# Patient Record
Sex: MALE | Race: WHITE | NOT HISPANIC OR LATINO | Employment: OTHER | URBAN - METROPOLITAN AREA
[De-identification: names, ages, dates, MRNs, and addresses within clinical notes are randomized per-mention and may not be internally consistent; named-entity substitution may affect disease eponyms.]

---

## 2017-01-25 DIAGNOSIS — Z86.73 H/O: CVA (CEREBROVASCULAR ACCIDENT): ICD-10-CM

## 2017-01-25 DIAGNOSIS — Z86.718 PERSONAL HISTORY OF DVT (DEEP VEIN THROMBOSIS): ICD-10-CM

## 2017-02-14 ENCOUNTER — OFFICE VISIT (OUTPATIENT)
Dept: MEDICAL GROUP | Facility: MEDICAL CENTER | Age: 62
End: 2017-02-14
Payer: MEDICARE

## 2017-02-14 VITALS
WEIGHT: 186 LBS | TEMPERATURE: 98.2 F | DIASTOLIC BLOOD PRESSURE: 84 MMHG | HEART RATE: 74 BPM | OXYGEN SATURATION: 96 % | SYSTOLIC BLOOD PRESSURE: 144 MMHG | HEIGHT: 72 IN | RESPIRATION RATE: 14 BRPM | BODY MASS INDEX: 25.19 KG/M2

## 2017-02-14 DIAGNOSIS — Z86.73 H/O: CVA (CEREBROVASCULAR ACCIDENT): ICD-10-CM

## 2017-02-14 DIAGNOSIS — J40 BRONCHITIS: ICD-10-CM

## 2017-02-14 DIAGNOSIS — Z79.01 CHRONIC ANTICOAGULATION: ICD-10-CM

## 2017-02-14 DIAGNOSIS — J02.9 SORE THROAT: ICD-10-CM

## 2017-02-14 DIAGNOSIS — Z99.3 WHEELCHAIR BOUND: ICD-10-CM

## 2017-02-14 DIAGNOSIS — I69.919 COGNITIVE DEFICITS AS LATE EFFECT OF CEREBROVASCULAR DISEASE: ICD-10-CM

## 2017-02-14 DIAGNOSIS — Z00.00 HEALTH CARE MAINTENANCE: ICD-10-CM

## 2017-02-14 DIAGNOSIS — I69.959 HEMIPLEGIA OF DOMINANT SIDE AS LATE EFFECT FOLLOWING CEREBROVASCULAR DISEASE (HCC): ICD-10-CM

## 2017-02-14 PROCEDURE — 99214 OFFICE O/P EST MOD 30 MIN: CPT | Performed by: INTERNAL MEDICINE

## 2017-02-14 RX ORDER — AZITHROMYCIN 250 MG/1
TABLET, FILM COATED ORAL
Qty: 6 TAB | Refills: 0 | Status: SHIPPED | OUTPATIENT
Start: 2017-02-14 | End: 2017-03-15

## 2017-02-14 RX ORDER — BENZONATATE 100 MG/1
100 CAPSULE ORAL 3 TIMES DAILY PRN
Qty: 60 CAP | Refills: 0 | Status: SHIPPED | OUTPATIENT
Start: 2017-02-14 | End: 2017-03-15

## 2017-02-14 NOTE — MR AVS SNAPSHOT
"        Drew Melton   2017 11:20 AM   Office Visit   MRN: [id number]    Department:  [place] [address]   Dept Phone:  [phone]    Description:  Male : 1955   Provider:  Jamari Platt M.D.           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   [facility] ([place])    [address]  [address]  [facility] [id number] [phone]           You will be receiving a confirmation call a few days before your appointment from our automated call confirmation system.            2017 10:00 AM   Established Patient with [facility] EXAM 5   [facility]  (--)    [address] [facility] [address]   [phone]            Mar 15, 2017 10:00 AM   Established Patient with Jamari Platt M.D.   [facility] ([place])    [address]  [address]  [facility] [id number] [phone]           You will be receiving a confirmation call a few days before your appointment from our automated call confirmation system.              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 (CMS-HCC) I69.959   Unknown - Present    Dyslipidemia (high LDL; low HDL) (Chronic) E78.4   10/8/2012 - Present    Chronic anticoagulation Z79.01   10/8/2013 - Present    Wheelchair bound Z99.3   3/18/2016 - Present    Health care maintenance Z00.00   9/15/2016 - Present    H/O: CVA (cerebrovascular accident) Z86.73   9/15/2016 - Present    Screening for malignant neoplasm of colon Z12.11   9/15/2016 - Present      Health Maintenance        Date Due Completion Dates    IMM INFLUENZA (1) 9/1/2016 10/21/2014, 10/8/2013, 10/10/2012    IMM ZOSTER VACCINE 12/20/2016 ---    COLONOSCOPY 4/8/2023 4/8/2013 (Declined)    Override on 4/8/2013: Patient Declined    IMM DTaP/Tdap/Td Vaccine (2 - Td) 4/8/2023 4/8/2013            Current Immunizations     Influenza TIV (IM) 10/8/2013, 10/10/2012    Influenza Vaccine Quad Inj (Preserved) 10/21/2014    Pneumococcal polysaccharide vaccine (PPSV-23) 10/21/2014, 10/8/2012 11:29 AM    Tdap Vaccine 4/8/2013      Below and/or attached are the medications your provider expects you to take. Review all of your home medications and newly ordered medications with your provider and/or pharmacist. Follow medication instructions as directed by your provider and/or pharmacist. Please keep your medication list with you and share with your provider. Update the information when medications are discontinued, doses are changed, or new medications (including over-the-counter products) are added; and carry medication information at all times in the event of emergency situations     Allergies:  No Known Allergies          Medications  Valid as of: February 14, 2017 - 11:12 AM    Generic Name Brand Name Tablet Size Instructions for use    Azithromycin (Tab) ZITHROMAX 250 MG Take 1 tabl twice daily the first  day, then 1 tabl daily, PO.        Benzonatate (Cap) TESSALON 100 MG Take 1 Cap by mouth 3 times a day as needed for Cough.        Cholecalciferol (Cap) Vitamin D3 2000 UNIT Take 1 Cap by mouth every day.        Lovastatin (Tab) MEVACOR 10 MG TAKE ONE TABLET BY MOUTH ONCE DAILY        Potassium Chloride Brigitte CR (Tab CR) Kdur 20 MEQ Take 1 Tab by mouth every day.        Warfarin Sodium (Tab) COUMADIN 2.5 MG TAKE ONE-HALF TO ONE TABLET BY MOUTH ONCE DAILY AS DIRECTED BY THE COUMADIN CLINIC        .                 Medicines prescribed today were sent to:     [facility] [address] [place] [address], [place] - [address] [place] [address]    Phone: [phone] Fax: [phone]    Open 24 Hours?: No      Medication refill instructions:       If your prescription bottle indicates you have medication refills left, it is not necessary to call your provider’s office. Please contact your pharmacy and they will refill your medication.    If your prescription bottle indicates you do not have any refills left, you may request refills at any time through one of the following ways: The online PassKit system (except Urgent Care), by calling your provider’s office, or by asking your pharmacy to contact your provider’s office with a refill request. Medication refills are processed only during regular business hours and may not be available until the next business day. Your provider may request additional information or to have a follow-up visit with you prior to refilling your medication.   *Please Note: Medication refills are assigned a new Rx number when refilled electronically. Your pharmacy may indicate that no refills were authorized even though a new prescription for the same medication is available at the pharmacy. Please request the medicine by name with the pharmacy before contacting your provider for a refill.           PassKit Access Code: [email or web address]  Expires: 3/16/2017 11:12 AM    Trevor HILLIARD  secure, online tool to manage your health information     [employer]’s [employer]® is a secure, online tool that connects you to your personalized health information from the privacy of your home -- day or night - making it very easy for you to manage your healthcare. Once the activation process is completed, you can even access your medical information using the Everyware Global hal, which is available for free in the Apple Hal store or Google Play store.     Every[employer] Global provides the following levels of access (as shown below):   My Chart Features   [facility] Doctor [facility]  Specialists [facility]  Urgent  Care [facility]  Primary Care  Doctor   Email your healthcare team securely and privately 24/7 X X X    Manage appointments: schedule your next appointment; view details of past/upcoming appointments X      Request prescription refills. X      View recent personal medical records, including lab and immunizations X X X X   View health record, including health history, allergies, medications X X X X   Read reports about your outpatient visits, procedures, consult and ER notes X X X X   See your discharge summary, which is a recap of your hospital and/or ER visit that includes your diagnosis, lab results, and care plan. X X       How to register for Everyware Global:  1. Go to  [email or web address].  2. Click on the Sign Up Now box, which takes you to the New Member Sign Up page. You will need to provide the following information:  a. Enter your Every[employer] Global Access Code exactly as it appears at the top of this page. (You will not need to use this code after you’ve completed the sign-up process. If you do not sign up before the expiration date, you must request a new code.)   b. Enter your date of birth.   c. Enter your home email address.   d. Click Submit, and follow the next screen’s instructions.  3. Create a Everyware Global ID. This will be your Everyware Global login ID and cannot be changed, so think of one that is secure and easy to  remember.  4. Create a [employer] password. You can change your password at any time.  5. Enter your Password Reset Question and Answer. This can be used at a later time if you forget your password.   6. Enter your e-mail address. This allows you to receive e-mail notifications when new information is available in [employer].  7. Click Sign Up. You can now view your health information.    For assistance activating your [employer] account, call [phone]
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
## 2017-02-22 ENCOUNTER — APPOINTMENT (OUTPATIENT)
Dept: VASCULAR LAB | Facility: MEDICAL CENTER | Age: 62
End: 2017-02-22
Attending: INTERNAL MEDICINE
Payer: MEDICARE

## 2017-03-01 ENCOUNTER — ANTICOAGULATION VISIT (OUTPATIENT)
Dept: VASCULAR LAB | Facility: MEDICAL CENTER | Age: 62
End: 2017-03-01
Attending: INTERNAL MEDICINE
Payer: MEDICARE

## 2017-03-01 DIAGNOSIS — Z79.01 CHRONIC ANTICOAGULATION: ICD-10-CM

## 2017-03-01 LAB
INR BLD: 2.6 (ref 0.9–1.2)
INR PPP: 2.6 (ref 2–3.5)

## 2017-03-01 PROCEDURE — 85610 PROTHROMBIN TIME: CPT

## 2017-03-01 PROCEDURE — 99212 OFFICE O/P EST SF 10 MIN: CPT | Performed by: NURSE PRACTITIONER

## 2017-03-01 NOTE — MR AVS SNAPSHOT
Drew Melton   3/1/2017 9:00 AM   Anticoagulation Visit   MRN: [id number]    Department:  Vascular Medicine   Dept Phone:  [phone]    Description:  Male : 1955   Provider:  [facility] EXAM 5           Allergies as of 3/1/2017     No Known Allergies      You were diagnosed with     Chronic anticoagulation   [256206]         Vital Signs     Smoking Status                   Former Smoker           Basic Information     Date Of Birth Sex Race Ethnicity Preferred Language    1955 Male White Non- English      Your appointments     Mar 01, 2017  9:00 AM   Established Patient with IHV EXAM 5   [facility]  (--)    [address]  [place] [address]   [phone]            Mar 15, 2017 10:00 AM   Established Patient with Jamari Platt M.D.   [facility]    [address]  [address]  [place] [id number] [phone]           You will be receiving a confirmation call a few days before your appointment from our automated call confirmation system.            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 (CMS-HCC) I69.959   Unknown - Present    Dyslipidemia (high LDL; low HDL) (Chronic) E78.4   10/8/2012 - Present    Chronic anticoagulation Z79.01   10/8/2013 - Present    Wheelchair bound Z99.3   3/18/2016 - Present    Health care maintenance Z00.00   9/15/2016 - Present    H/O: CVA (cerebrovascular accident) Z86.73   9/15/2016 - Present    Screening for malignant neoplasm of colon Z12.11    9/15/2016 - Present      Health Maintenance        Date Due Completion Dates    IMM INFLUENZA (1) 9/1/2016 10/21/2014, 10/8/2013, 10/10/2012    IMM ZOSTER VACCINE 12/20/2016 ---    COLONOSCOPY 4/8/2023 4/8/2013 (Declined)    Override on 4/8/2013: Patient Declined    IMM DTaP/Tdap/Td Vaccine (2 - Td) 4/8/2023 4/8/2013            Results     POCT Protime      Component    INR    2.6                        Current Immunizations     Influenza TIV (IM) 10/8/2013, 10/10/2012    Influenza Vaccine Quad Inj (Preserved) 10/21/2014    Pneumococcal polysaccharide vaccine (PPSV-23) 10/21/2014, 10/8/2012 11:29 AM    Tdap Vaccine 4/8/2013      Below and/or attached are the medications your provider expects you to take. Review all of your home medications and newly ordered medications with your provider and/or pharmacist. Follow medication instructions as directed by your provider and/or pharmacist. Please keep your medication list with you and share with your provider. Update the information when medications are discontinued, doses are changed, or new medications (including over-the-counter products) are added; and carry medication information at all times in the event of emergency situations     Allergies:  No Known Allergies          Medications  Valid as of: March 01, 2017 -  8:34 AM    Generic Name Brand Name Tablet Size Instructions for use    Azithromycin (Tab) ZITHROMAX 250 MG Take 1 tabl twice daily the first day, then 1 tabl daily, PO.        Benzonatate (Cap) TESSALON 100 MG Take 1 Cap by mouth 3 times a day as needed for Cough.        Cholecalciferol (Cap) Vitamin D3 2000 UNIT Take 1 Cap by mouth every day.        Lovastatin (Tab) MEVACOR 10 MG TAKE ONE TABLET BY MOUTH ONCE DAILY        Potassium Chloride Brigitte CR (Tab CR) Kdur 20 MEQ Take 1 Tab by mouth every day.        Warfarin Sodium (Tab) COUMADIN 2.5 MG TAKE ONE-HALF TO ONE TABLET BY MOUTH ONCE DAILY AS DIRECTED BY THE COUMADIN CLINIC        .                    Medicines prescribed today were sent to:     [facility] - [place], [place] - [address] [place] [address]    Phone: [phone] Fax: [phone]    Open 24 Hours?: No      Medication refill instructions:       If your prescription bottle indicates you have medication refills left, it is not necessary to call your provider’s office. Please contact your pharmacy and they will refill your medication.    If your prescription bottle indicates you do not have any refills left, you may request refills at any time through one of the following ways: The online Axial Exchange system (except Urgent Care), by calling your provider’s office, or by asking your pharmacy to contact your provider’s office with a refill request. Medication refills are processed only during regular business hours and may not be available until the next business day. Your provider may request additional information or to have a follow-up visit with you prior to refilling your medication.   *Please Note: Medication refills are assigned a new Rx number when refilled electronically. Your pharmacy may indicate that no refills were authorized even though a new prescription for the same medication is available at the pharmacy. Please request the medicine by name with the pharmacy before contacting your provider for a refill.        Warfarin Dosing Calendar   March 2017 Details    Sun Mon Tue Wed Thu Fri Sat        1   2.6   1.25 mg   See details      2      2.5 mg         3      2.5 mg         4      2.5 mg           5      2.5 mg         6      2.5 mg         7      2.5 mg         8      1.25 mg         9      2.5 mg         10      2.5 mg         11      2.5 mg           12      2.5 mg         13      2.5 mg         14      2.5 mg         15      1.25 mg         16      2.5 mg         17      2.5 mg         18      2.5 mg           19      2.5 mg         20      2.5 mg         21      2.5 mg         22      1.25 mg          23      2.5 mg         24      2.5 mg         25      2.5 mg           26      2.5 mg         27      2.5 mg         28      2.5 mg         29      1.25 mg         30      2.5 mg         31      2.5 mg           Date Details   03/01 This INR check   INR: 2.6               How to take your warfarin dose     To take:  1.25 mg Take 0.5 of a 2.5 mg tablet.    To take:  2.5 mg Take 1 of the 2.5 mg tablets.           Warfarin Dosing Calendar   April 2017 Details    Sun Mon Tue Wed Thu Fri Sat           1      2.5 mg           2      2.5 mg         3      2.5 mg         4      2.5 mg         5      1.25 mg         6      2.5 mg         7      2.5 mg         8      2.5 mg           9      2.5 mg         10      2.5 mg         11      2.5 mg         12      1.25 mg         13      2.5 mg         14      2.5 mg         15      2.5 mg           16      2.5 mg         17      2.5 mg         18      2.5 mg         19      1.25 mg         20      2.5 mg         21      2.5 mg         22      2.5 mg           23      2.5 mg         24      2.5 mg         25      2.5 mg         26      1.25 mg         27      2.5 mg         28      2.5 mg         29      2.5 mg           30      2.5 mg                Date Details   No additional details            How to take your warfarin dose     To take:  1.25 mg Take 0.5 of a 2.5 mg tablet.    To take:  2.5 mg Take 1 of the 2.5 mg tablets.           Warfarin Dosing Calendar   May 2017 Details    Sun Mon Tue Wed Thu Fri Sat      1      2.5 mg         2      2.5 mg         3      1.25 mg         4               5               6                 7               8               9               10               11               12               13                 14               15               16               17               18               19               20                 21               22               23               24               25               26               27                  28               29               30               31                   Date Details   No additional details    Date of next INR:  5/3/2017         How to take your warfarin dose     To take:  1.25 mg Take 0.5 of a 2.5 mg tablet.    To take:  2.5 mg Take 1 of the 2.5 mg tablets.              Nutraspace Access Code: [email or web address]  Expires: 3/16/2017 11:12 AM    Nutraspace  A secure, online tool to manage your health information     [employer]’s Nutraspace® is a secure, online tool that connects you to your personalized health information from the privacy of your home -- day or night - making it very easy for you to manage your healthcare. Once the activation process is completed, you can even access your medical information using the Nutraspace hal, which is available for free in the Apple Hal store or Google Play store.     Nutraspace provides the following levels of access (as shown below):   My Chart Features   [facility] Primary Care Doctor [facility]  Specialists [facility]  Urgent  Care Non-[facility]  Primary Care  Doctor   Email your healthcare team securely and privately 24/7 X X X    Manage appointments: schedule your next appointment; view details of past/upcoming appointments X      Request prescription refills. X      View recent personal medical records, including lab and immunizations X X X X   View health record, including health history, allergies, medications X X X X   Read reports about your outpatient visits, procedures, consult and ER notes X X X X   See your discharge summary, which is a recap of your hospital and/or ER visit that includes your diagnosis, lab results, and care plan. X X       How to register for Nutraspace:  1. Go to  [email or web address].  2. Click on the Sign Up Now box, which takes you to the New Member Sign Up page. You will need to provide the following information:  a. Enter your Nutraspace Access Code exactly as it appears at the top of this page. (You will not need to use this code after  you’ve completed the sign-up process. If you do not sign up before the expiration date, you must request a new code.)   b. Enter your date of birth.   c. Enter your home email address.   d. Click Submit, and follow the next screen’s instructions.  3. Create a ADVANCED MEDICAL ISOTOPEt ID. This will be your ADVANCED MEDICAL ISOTOPEt login ID and cannot be changed, so think of one that is secure and easy to remember.  4. Create a ADVANCED MEDICAL ISOTOPEt password. You can change your password at any time.  5. Enter your Password Reset Question and Answer. This can be used at a later time if you forget your password.   6. Enter your e-mail address. This allows you to receive e-mail notifications when new information is available in ScaleIO.  7. Click Sign Up. You can now view your health information.    For assistance activating your ScaleIO account, call [phone]

## 2017-03-01 NOTE — PROGRESS NOTES
Anticoagulation Summary as of 3/1/2017     INR goal 2.0-3.0   Selected INR 2.6 (3/1/2017)   Maintenance plan 1.25 mg (2.5 mg x 0.5) on Wed; 2.5 mg (2.5 mg x 1) all other days   Weekly total 16.25 mg   No change documented Khushboo Wiseman, A.P.N.   Plan last modified Gary Wall, PHARMD (9/30/2015)   Next INR check 5/3/2017   Target end date Indefinite    Indications   Chronic anticoagulation [Z79.01]  Deep vein thrombosis [453.40] (Resolved) [I82.409]  Stroke [434.91] (Resolved) [I63.9]  Deep vein thrombosis (DVT) (CMS-HCC) (Resolved) [I82.409]         Anticoagulation Episode Summary     [facility]    Preferred lab     Send INR reminders to     Comments       Anticoagulation Care Providers     Provider Role Specialty Phone number    Jamari Platt M.D. Referring Family Medicine [phone]    [facility]   [phone]        Patient is therapeutic today. On azithromycin & Tessalon last month. Encouraged to call for future medication changes. Denies any diet changes. No current symptoms of bleeding or thrombosis reported. Continue current regimen. Follow up in 9 weeks.    Next Appointment: Wednesday, May 3, 2017 at 9:45 am.     Khushboo FREED

## 2017-03-02 ENCOUNTER — HOSPITAL ENCOUNTER (OUTPATIENT)
Dept: LAB | Facility: MEDICAL CENTER | Age: 62
End: 2017-03-02
Attending: INTERNAL MEDICINE
Payer: MEDICARE

## 2017-03-02 DIAGNOSIS — Z00.00 HEALTH CARE MAINTENANCE: ICD-10-CM

## 2017-03-02 DIAGNOSIS — E78.5 DYSLIPIDEMIA (HIGH LDL; LOW HDL): Chronic | ICD-10-CM

## 2017-03-02 DIAGNOSIS — Z12.5 ENCOUNTER FOR SCREENING FOR MALIGNANT NEOPLASM OF PROSTATE: ICD-10-CM

## 2017-03-02 LAB
ANION GAP SERPL CALC-SCNC: 9 MMOL/L (ref 0–11.9)
BASOPHILS # BLD AUTO: 0.03 K/UL (ref 0–0.12)
BASOPHILS NFR BLD AUTO: 0.7 % (ref 0–1.8)
BUN SERPL-MCNC: 30 MG/DL (ref 8–22)
CALCIUM SERPL-MCNC: 9.7 MG/DL (ref 8.5–10.5)
CHLORIDE SERPL-SCNC: 109 MMOL/L (ref 96–112)
CHOLEST SERPL-MCNC: 136 MG/DL (ref 100–199)
CO2 SERPL-SCNC: 28 MMOL/L (ref 20–33)
CREAT SERPL-MCNC: 0.85 MG/DL (ref 0.5–1.4)
EOSINOPHIL # BLD: 0.09 K/UL (ref 0–0.51)
EOSINOPHIL NFR BLD AUTO: 2.1 % (ref 0–6.9)
ERYTHROCYTE [DISTWIDTH] IN BLOOD BY AUTOMATED COUNT: 51.5 FL (ref 35.9–50)
GLUCOSE SERPL-MCNC: 118 MG/DL (ref 65–99)
HCT VFR BLD AUTO: 46.5 % (ref 42–52)
HDLC SERPL-MCNC: 31 MG/DL
HGB BLD-MCNC: 15.4 G/DL (ref 14–18)
IMM GRANULOCYTES # BLD AUTO: 0.01 K/UL (ref 0–0.11)
IMM GRANULOCYTES NFR BLD AUTO: 0.2 % (ref 0–0.9)
LDLC SERPL CALC-MCNC: 64 MG/DL
LYMPHOCYTES # BLD: 1.56 K/UL (ref 1–4.8)
LYMPHOCYTES NFR BLD AUTO: 36.3 % (ref 22–41)
MCH RBC QN AUTO: 32 PG (ref 27–33)
MCHC RBC AUTO-ENTMCNC: 33.1 G/DL (ref 33.7–35.3)
MCV RBC AUTO: 96.5 FL (ref 81.4–97.8)
MONOCYTES # BLD: 0.26 K/UL (ref 0–0.85)
MONOCYTES NFR BLD AUTO: 6 % (ref 0–13.4)
NEUTROPHILS # BLD: 2.35 K/UL (ref 1.82–7.42)
NEUTROPHILS NFR BLD AUTO: 54.7 % (ref 44–72)
NRBC # BLD AUTO: 0 K/UL
NRBC BLD-RTO: 0 /100 WBC
PLATELET # BLD AUTO: 177 K/UL (ref 164–446)
PMV BLD AUTO: 10 FL (ref 9–12.9)
POTASSIUM SERPL-SCNC: 4.2 MMOL/L (ref 3.6–5.5)
PSA SERPL DL<=0.01 NG/ML-MCNC: 0.56 NG/ML (ref 0–4)
RBC # BLD AUTO: 4.82 M/UL (ref 4.7–6.1)
SODIUM SERPL-SCNC: 146 MMOL/L (ref 135–145)
TRIGL SERPL-MCNC: 203 MG/DL (ref 0–149)
WBC # BLD AUTO: 4.3 K/UL (ref 4.8–10.8)

## 2017-03-02 PROCEDURE — 36415 COLL VENOUS BLD VENIPUNCTURE: CPT

## 2017-03-02 PROCEDURE — 85025 COMPLETE CBC W/AUTO DIFF WBC: CPT

## 2017-03-02 PROCEDURE — 80061 LIPID PANEL: CPT

## 2017-03-02 PROCEDURE — 84153 ASSAY OF PSA TOTAL: CPT | Mod: GA

## 2017-03-02 PROCEDURE — 80048 BASIC METABOLIC PNL TOTAL CA: CPT

## 2017-03-03 ENCOUNTER — TELEPHONE (OUTPATIENT)
Dept: MEDICAL GROUP | Facility: MEDICAL CENTER | Age: 62
End: 2017-03-03

## 2017-03-03 NOTE — TELEPHONE ENCOUNTER
----- Message from Michael Whittaker M.D. sent at 3/2/2017  5:24 PM PST -----  MA to call patient to relate the following:     Recent lab results were mostly within normal limits, apart from moderate dehydration (high sodium, high BUN) and high cholesterol.  If patient would like to discuss them in further detail, you can discuss this with Dr. Lindo at clinic visit on 03/15/17 @10am

## 2017-03-14 RX ORDER — LOVASTATIN 10 MG/1
TABLET ORAL
Qty: 90 TAB | Refills: 1 | Status: CANCELLED | OUTPATIENT
Start: 2017-03-14

## 2017-03-14 RX ORDER — POTASSIUM CHLORIDE 20 MEQ/1
20 TABLET, EXTENDED RELEASE ORAL DAILY
Qty: 90 TAB | Refills: 0 | Status: CANCELLED | OUTPATIENT
Start: 2017-03-14

## 2017-03-15 ENCOUNTER — OFFICE VISIT (OUTPATIENT)
Dept: MEDICAL GROUP | Facility: MEDICAL CENTER | Age: 62
End: 2017-03-15
Payer: MEDICARE

## 2017-03-15 VITALS
HEIGHT: 72 IN | WEIGHT: 186 LBS | OXYGEN SATURATION: 97 % | TEMPERATURE: 97.2 F | BODY MASS INDEX: 25.19 KG/M2 | HEART RATE: 73 BPM | DIASTOLIC BLOOD PRESSURE: 68 MMHG | SYSTOLIC BLOOD PRESSURE: 122 MMHG

## 2017-03-15 DIAGNOSIS — Z00.00 HEALTH CARE MAINTENANCE: ICD-10-CM

## 2017-03-15 DIAGNOSIS — E78.5 DYSLIPIDEMIA (HIGH LDL; LOW HDL): Chronic | ICD-10-CM

## 2017-03-15 DIAGNOSIS — E87.6 HYPOKALEMIA: ICD-10-CM

## 2017-03-15 DIAGNOSIS — R73.01 IFG (IMPAIRED FASTING GLUCOSE): ICD-10-CM

## 2017-03-15 PROBLEM — R53.81 PHYSICAL DECONDITIONING: Status: ACTIVE | Noted: 2017-03-15

## 2017-03-15 PROCEDURE — 99214 OFFICE O/P EST MOD 30 MIN: CPT | Performed by: INTERNAL MEDICINE

## 2017-03-15 RX ORDER — FENOFIBRATE 48 MG/1
48 TABLET, COATED ORAL DAILY
Qty: 90 TAB | Refills: 1 | Status: SHIPPED | OUTPATIENT
Start: 2017-03-15 | End: 2017-09-10 | Stop reason: SDUPTHER

## 2017-03-15 RX ORDER — POTASSIUM CHLORIDE 20 MEQ/1
20 TABLET, EXTENDED RELEASE ORAL DAILY
Qty: 90 TAB | Refills: 1 | Status: SHIPPED | OUTPATIENT
Start: 2017-03-15 | End: 2017-09-10 | Stop reason: SDUPTHER

## 2017-03-15 RX ORDER — LOVASTATIN 10 MG/1
10 TABLET ORAL DAILY
Qty: 90 TAB | Refills: 1 | Status: SHIPPED | OUTPATIENT
Start: 2017-03-15 | End: 2017-09-10 | Stop reason: SDUPTHER

## 2017-03-15 ASSESSMENT — PATIENT HEALTH QUESTIONNAIRE - PHQ9: CLINICAL INTERPRETATION OF PHQ2 SCORE: 0

## 2017-03-15 NOTE — MR AVS SNAPSHOT
Drew Melton   3/15/2017 10:00 AM   Office Visit   MRN: [id number]    Department:  [place] [address]   Dept Phone:  [phone]    Description:  Male : 1955   Provider:  Jamari Platt M.D.           Reason for Visit     Follow-Up           Allergies as of 3/15/2017     No Known Allergies      You were diagnosed with     IFG (impaired fasting glucose)   [477616]       Dyslipidemia (high LDL; low HDL)   [543448]       Hypokalemia   [004407]       Health care maintenance   [434354]         Vital Signs     Blood Pressure Pulse Temperature Height Weight Body Mass Index    122/68 mmHg 73 36.2 °C (97.2 °F) 1.829 m (6') 84.369 kg (186 lb) 25.22 kg/m2    Oxygen Saturation Smoking Status                97% Former Smoker          Basic Information     Date Of Birth Sex Race Ethnicity Preferred Language    1955 Male White Non- English      Your appointments     May 03, 2017  9:45 AM   Established Patient with [facility] EXAM 5   [facility]  (--)    [address] [address]   [phone]            2017 10:40 AM   ANNUAL EXAM PREVENTATIVE with Jamari Platt M.D.   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 (CMS-HCC) I69.959   Unknown - Present    Dyslipidemia (high LDL; low HDL) (Chronic) E78.4   10/8/2012 - Present    Chronic anticoagulation Z79.01   10/8/2013 - Present    Wheelchair bound Z99.3   3/18/2016 - Present    Health care maintenance Z00.00   9/15/2016 - Present    H/O: CVA (cerebrovascular accident) Z86.73   9/15/2016 - Present    Screening for malignant neoplasm of  colon Z12.11   9/15/2016 - Present      Health Maintenance        Date Due Completion Dates    IMM INFLUENZA (1) 9/1/2016 10/21/2014, 10/8/2013, 10/10/2012    IMM ZOSTER VACCINE 12/20/2016 ---    COLONOSCOPY 4/8/2023 4/8/2013 (Declined)    Override on 4/8/2013: Patient Declined    IMM DTaP/Tdap/Td Vaccine (2 - Td) 4/8/2023 4/8/2013            Current Immunizations     Influenza TIV (IM) 10/8/2013, 10/10/2012    Influenza Vaccine Quad Inj (Preserved) 10/21/2014    Pneumococcal polysaccharide vaccine (PPSV-23) 10/21/2014, 10/8/2012 11:29 AM    Tdap Vaccine 4/8/2013      Below and/or attached are the medications your provider expects you to take. Review all of your home medications and newly ordered medications with your provider and/or pharmacist. Follow medication instructions as directed by your provider and/or pharmacist. Please keep your medication list with you and share with your provider. Update the information when medications are discontinued, doses are changed, or new medications (including over-the-counter products) are added; and carry medication information at all times in the event of emergency situations     Allergies:  No Known Allergies          Medications  Valid as of: March 15, 2017 - 10:09 AM    Generic Name Brand Name Tablet Size Instructions for use    Cholecalciferol (Cap) Vitamin D3 2000 UNIT Take 1 Cap by mouth every day.        Fenofibrate (Tab) TRICOR 48 MG Take 1 Tab by mouth every day.        Lovastatin (Tab) MEVACOR 10 MG Take 1 Tab by mouth every day.        Potassium Chloride Brigitte CR (Tab CR) Kdur 20 MEQ Take 1 Tab by mouth every day.        Warfarin Sodium (Tab) COUMADIN 2.5 MG TAKE ONE-HALF TO ONE TABLET BY MOUTH ONCE DAILY AS DIRECTED BY THE COUMADIN CLINIC        .                 Medicines prescribed today were sent to:     [facility] [address] [place] [address] - [address] [address]    Phone: [phone] Fax: [phone]    Open 24  Hours?: No      Medication refill instructions:       If your prescription bottle indicates you have medication refills left, it is not necessary to call your provider’s office. Please contact your pharmacy and they will refill your medication.    If your prescription bottle indicates you do not have any refills left, you may request refills at any time through one of the following ways: The online Quaam system (except Urgent Care), by calling your provider’s office, or by asking your pharmacy to contact your provider’s office with a refill request. Medication refills are processed only during regular business hours and may not be available until the next business day. Your provider may request additional information or to have a follow-up visit with you prior to refilling your medication.   *Please Note: Medication refills are assigned a new Rx number when refilled electronically. Your pharmacy may indicate that no refills were authorized even though a new prescription for the same medication is available at the pharmacy. Please request the medicine by name with the pharmacy before contacting your provider for a refill.        Your To Do List     Future Labs/Procedures Complete By Expires    BASIC METABOLIC PANEL  As directed 3/16/2018    HEMOGLOBIN A1C  As directed 3/16/2018    LIPID PROFILE  As directed 3/16/2018    MICROALBUMIN CREAT RATIO URINE  As directed 3/16/2018         Quaam Access Code: [email or web address]  Expires: 3/16/2017 12:12 PM    Quaam  A secure, online tool to manage your health information     [employer]’s Quaam® is a secure, online tool that connects you to your personalized health information from the privacy of your home -- day or night - making it very easy for you to manage your healthcare. Once the activation process is completed, you can even access your medical information using the Quaam hal, which is available for free in the Apple Hal store or Google Play store.      Timescape provides the following levels of access (as shown below):   My Chart Features   [facility] Primary Care Doctor [facility]  Specialists [facility]  Urgent  Care Non-[facility]  Primary Care  Doctor   Email your healthcare team securely and privately 24/7 X X X    Manage appointments: schedule your next appointment; view details of past/upcoming appointments X      Request prescription refills. X      View recent personal medical records, including lab and immunizations X X X X   View health record, including health history, allergies, medications X X X X   Read reports about your outpatient visits, procedures, consult and ER notes X X X X   See your discharge summary, which is a recap of your hospital and/or ER visit that includes your diagnosis, lab results, and care plan. X X       How to register for Timescape:  1. Go to  [email or web address].  2. Click on the Sign Up Now box, which takes you to the New Member Sign Up page. You will need to provide the following information:  a. Enter your Timescape Access Code exactly as it appears at the top of this page. (You will not need to use this code after you’ve completed the sign-up process. If you do not sign up before the expiration date, you must request a new code.)   b. Enter your date of birth.   c. Enter your home email address.   d. Click Submit, and follow the next screen’s instructions.  3. Create a Timescape ID. This will be your Timescape login ID and cannot be changed, so think of one that is secure and easy to remember.  4. Create a Timescape password. You can change your password at any time.  5. Enter your Password Reset Question and Answer. This can be used at a later time if you forget your password.   6. Enter your e-mail address. This allows you to receive e-mail notifications when new information is available in Timescape.  7. Click Sign Up. You can now view your health information.    For assistance activating your Timescape account, call [phone]

## 2017-03-16 DIAGNOSIS — E78.5 DYSLIPIDEMIA (HIGH LDL; LOW HDL): Chronic | ICD-10-CM

## 2017-05-09 ENCOUNTER — ANTICOAGULATION VISIT (OUTPATIENT)
Dept: VASCULAR LAB | Facility: MEDICAL CENTER | Age: 62
End: 2017-05-09
Attending: INTERNAL MEDICINE
Payer: MEDICARE

## 2017-05-09 DIAGNOSIS — Z79.01 CHRONIC ANTICOAGULATION: ICD-10-CM

## 2017-05-09 LAB — INR PPP: 2.5 (ref 2–3.5)

## 2017-05-09 PROCEDURE — 99211 OFF/OP EST MAY X REQ PHY/QHP: CPT

## 2017-05-09 PROCEDURE — 85610 PROTHROMBIN TIME: CPT

## 2017-05-09 NOTE — PROGRESS NOTES
Anticoagulation Summary as of 5/9/2017     INR goal 2.0-3.0   Selected INR 2.5 (5/9/2017)   Maintenance plan 1.25 mg (2.5 mg x 0.5) on Wed; 2.5 mg (2.5 mg x 1) all other days   Weekly total 16.25 mg   Plan last modified Gary Wall, PHARMD (9/30/2015)   Next INR check 7/18/2017   Target end date Indefinite    Indications   Chronic anticoagulation [Z79.01]  Deep vein thrombosis [453.40] (Resolved) [I82.409]  Stroke [434.91] (Resolved) [I63.9]  Deep vein thrombosis (DVT) (CMS-HCC) (Resolved) [I82.409]         Anticoagulation Episode Summary     [facility]    Preferred lab     Send INR reminders to     Comments       Anticoagulation Care Providers     Provider Role Specialty Phone number    Jamari Platt M.D. Referring Family Medicine [phone]    [facility]   [phone]        Anticoagulation Patient Findings      Drew Melton seen in clinic today  INR  therapeutic.    Denies signs/symptoms of bleeding and/or thrombosis.    Denies changes to diet or medications.   Follow up appointment in 10 week(s).      Continue weekly warfarin dose as noted    Gary Wall, PHARMD

## 2017-05-09 NOTE — MR AVS SNAPSHOT
Drew Melton   2017 11:45 AM   Anticoagulation Visit   MRN: [id number]    Department:  Vascular Medicine   Dept Phone:  [phone]    Description:  Male : 1955   Provider:  [facility] EXAM 5           Allergies as of 2017     No Known Allergies      You were diagnosed with     Chronic anticoagulation   [082342]         Vital Signs     Smoking Status                   Former Smoker           Basic Information     Date Of Birth Sex Race Ethnicity Preferred Language    1955 Male White Non- English      Your appointments     May 09, 2017 11:45 AM   Established Patient with [facility] EXAM 5   [facility]  (--)    [address]  [place] [address]   [phone]            2017 10:40 AM   ANNUAL EXAM PREVENTATIVE with Jamari Platt M.D.   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 (CMS-HCC) I69.959   Unknown - Present    Dyslipidemia (high LDL; low HDL) (Chronic) E78.4   10/8/2012 - Present    Chronic anticoagulation Z79.01   10/8/2013 - Present    Wheelchair bound Z99.3   3/18/2016 - Present    Health care maintenance Z00.00   9/15/2016 - Present    H/O: CVA (cerebrovascular accident) Z86.73   9/15/2016 - Present    Screening for malignant neoplasm of colon Z12.11   9/15/2016 - Present    Physical deconditioning R53.81   3/15/2017 - Present      Health Maintenance        Date Due  Completion Dates    IMM ZOSTER VACCINE 12/20/2016 ---    COLONOSCOPY 4/8/2023 4/8/2013 (Declined)    Override on 4/8/2013: Patient Declined    IMM DTaP/Tdap/Td Vaccine (2 - Td) 4/8/2023 4/8/2013            Results     POCT Protime      Component    INR    2.5                        Current Immunizations     Influenza TIV (IM) 10/8/2013, 10/10/2012    Influenza Vaccine Quad Inj (Preserved) 10/21/2014    Pneumococcal polysaccharide vaccine (PPSV-23) 10/21/2014, 10/8/2012 11:29 AM    Tdap Vaccine 4/8/2013      Below and/or attached are the medications your provider expects you to take. Review all of your home medications and newly ordered medications with your provider and/or pharmacist. Follow medication instructions as directed by your provider and/or pharmacist. Please keep your medication list with you and share with your provider. Update the information when medications are discontinued, doses are changed, or new medications (including over-the-counter products) are added; and carry medication information at all times in the event of emergency situations     Allergies:  No Known Allergies          Medications  Valid as of: May 09, 2017 - 11:34 AM    Generic Name Brand Name Tablet Size Instructions for use    Cholecalciferol (Cap) Vitamin D3 2000 UNIT Take 1 Cap by mouth every day.        Fenofibrate (Tab) TRICOR 48 MG Take 1 Tab by mouth every day.        Lovastatin (Tab) MEVACOR 10 MG Take 1 Tab by mouth every day.        Potassium Chloride Brigitte CR (Tab CR) Kdur 20 MEQ Take 1 Tab by mouth every day.        Warfarin Sodium (Tab) COUMADIN 2.5 MG TAKE ONE-HALF TO ONE TABLET BY MOUTH ONCE DAILY AS DIRECTED BY THE COUMADIN CLINIC        .                 Medicines prescribed today were sent to:     [facility] [address] [place] [address] - [address] [address]    Phone: [phone] Fax: [phone]    Open 24 Hours?: No      Medication refill instructions:       If your  prescription bottle indicates you have medication refills left, it is not necessary to call your provider’s office. Please contact your pharmacy and they will refill your medication.    If your prescription bottle indicates you do not have any refills left, you may request refills at any time through one of the following ways: The online [employer] system (except Urgent Care), by calling your provider’s office, or by asking your pharmacy to contact your provider’s office with a refill request. Medication refills are processed only during regular business hours and may not be available until the next business day. Your provider may request additional information or to have a follow-up visit with you prior to refilling your medication.   *Please Note: Medication refills are assigned a new Rx number when refilled electronically. Your pharmacy may indicate that no refills were authorized even though a new prescription for the same medication is available at the pharmacy. Please request the medicine by name with the pharmacy before contacting your provider for a refill.        Warfarin Dosing Calendar   May 2017 Details    Sun Mon Tue Wed Thu Fri Sat      1               2               3               4               5               6                 7               8               9   2.5   2.5 mg   See details      10      1.25 mg         11      2.5 mg         12      2.5 mg         13      2.5 mg           14      2.5 mg         15      2.5 mg         16      2.5 mg         17      1.25 mg         18      2.5 mg         19      2.5 mg         20      2.5 mg           21      2.5 mg         22      2.5 mg         23      2.5 mg         24      1.25 mg         25      2.5 mg         26      2.5 mg         27      2.5 mg           28      2.5 mg         29      2.5 mg         30      2.5 mg         31      1.25 mg             Date Details   05/09 This INR check   INR: 2.5               How to take your warfarin dose      To take:  1.25 mg Take 0.5 of a 2.5 mg tablet.    To take:  2.5 mg Take 1 of the 2.5 mg tablets.           Warfarin Dosing Calendar   June 2017 Details    Sun Mon Tue Wed Thu Fri Sat         1      2.5 mg         2      2.5 mg         3      2.5 mg           4      2.5 mg         5      2.5 mg         6      2.5 mg         7      1.25 mg         8      2.5 mg         9      2.5 mg         10      2.5 mg           11      2.5 mg         12      2.5 mg         13      2.5 mg         14      1.25 mg         15      2.5 mg         16      2.5 mg         17      2.5 mg           18      2.5 mg         19      2.5 mg         20      2.5 mg         21      1.25 mg         22      2.5 mg         23      2.5 mg         24      2.5 mg           25      2.5 mg         26      2.5 mg         27      2.5 mg         28      1.25 mg         29      2.5 mg         30      2.5 mg           Date Details   No additional details            How to take your warfarin dose     To take:  1.25 mg Take 0.5 of a 2.5 mg tablet.    To take:  2.5 mg Take 1 of the 2.5 mg tablets.           Warfarin Dosing Calendar   July 2017 Details    Sun Mon Tue Wed Thu Fri Sat           1      2.5 mg           2      2.5 mg         3      2.5 mg         4      2.5 mg         5      1.25 mg         6      2.5 mg         7      2.5 mg         8      2.5 mg           9      2.5 mg         10      2.5 mg         11      2.5 mg         12      1.25 mg         13      2.5 mg         14      2.5 mg         15      2.5 mg           16      2.5 mg         17      2.5 mg         18      2.5 mg         19               20               21               22                 23               24               25               26               27               28               29                 30               31                     Date Details   No additional details    Date of next INR:  7/18/2017         How to take your warfarin dose     To take:  1.25 mg Take 0.5 of a  2.5 mg tablet.    To take:  2.5 mg Take 1 of the 2.5 mg tablets.              [employer] Access Code: [email or web address]  Expires: 6/2/2017 12:55 PM    [employer]  A secure, online tool to manage your health information     [employer]® is a secure, online tool that connects you to your personalized health information from the privacy of your home -- day or night - making it very easy for you to manage your healthcare. Once the activation process is completed, you can even access your medical information using the [employer] hal, which is available for free in the Apple Hal store or Google Play store.     [employer] provides the following levels of access (as shown below):   My Chart Features   [facility] Doctor [facility]  Specialists [facility]  Urgent  Care Non-[facility]  Primary Care  Doctor   Email your healthcare team securely and privately 24/7 X X X    Manage appointments: schedule your next appointment; view details of past/upcoming appointments X      Request prescription refills. X      View recent personal medical records, including lab and immunizations X X X X   View health record, including health history, allergies, medications X X X X   Read reports about your outpatient visits, procedures, consult and ER notes X X X X   See your discharge summary, which is a recap of your hospital and/or ER visit that includes your diagnosis, lab results, and care plan. X X       How to register for [employer]:  1. Go to  [email or web address].  2. Click on the Sign Up Now box, which takes you to the New Member Sign Up page. You will need to provide the following information:  a. Enter your [employer] Access Code exactly as it appears at the top of this page. (You will not need to use this code after you’ve completed the sign-up process. If you do not sign up before the expiration date, you must request a new code.)   b. Enter your date of birth.   c. Enter your home email address.   d. Click Submit, and follow the  next screen’s instructions.  3. Create a [employer] ID. This will be your [employer] login ID and cannot be changed, so think of one that is secure and easy to remember.  4. Create a [employer] password. You can change your password at any time.  5. Enter your Password Reset Question and Answer. This can be used at a later time if you forget your password.   6. Enter your e-mail address. This allows you to receive e-mail notifications when new information is available in [employer].  7. Click Sign Up. You can now view your health information.    For assistance activating your [employer] account, call [phone]

## 2017-05-10 LAB — INR BLD: 2.5 (ref 0.9–1.2)

## 2017-06-13 ENCOUNTER — HOSPITAL ENCOUNTER (OUTPATIENT)
Dept: LAB | Facility: MEDICAL CENTER | Age: 62
End: 2017-06-13
Attending: INTERNAL MEDICINE
Payer: MEDICARE

## 2017-06-13 DIAGNOSIS — E87.6 HYPOKALEMIA: ICD-10-CM

## 2017-06-13 DIAGNOSIS — E78.5 DYSLIPIDEMIA (HIGH LDL; LOW HDL): Chronic | ICD-10-CM

## 2017-06-13 DIAGNOSIS — R73.01 IFG (IMPAIRED FASTING GLUCOSE): ICD-10-CM

## 2017-06-13 LAB
ANION GAP SERPL CALC-SCNC: 8 MMOL/L (ref 0–11.9)
BUN SERPL-MCNC: 30 MG/DL (ref 8–22)
CALCIUM SERPL-MCNC: 10.2 MG/DL (ref 8.5–10.5)
CHLORIDE SERPL-SCNC: 108 MMOL/L (ref 96–112)
CHOLEST SERPL-MCNC: 174 MG/DL (ref 100–199)
CO2 SERPL-SCNC: 27 MMOL/L (ref 20–33)
CREAT SERPL-MCNC: 1 MG/DL (ref 0.5–1.4)
CREAT UR-MCNC: 209.9 MG/DL
EST. AVERAGE GLUCOSE BLD GHB EST-MCNC: 94 MG/DL
GFR SERPL CREATININE-BSD FRML MDRD: >60 ML/MIN/1.73 M 2
GLUCOSE SERPL-MCNC: 86 MG/DL (ref 65–99)
HBA1C MFR BLD: 4.9 % (ref 0–5.6)
HDLC SERPL-MCNC: 34 MG/DL
LDLC SERPL CALC-MCNC: 107 MG/DL
MICROALBUMIN UR-MCNC: 0.8 MG/DL
MICROALBUMIN/CREAT UR: 4 MG/G (ref 0–30)
POTASSIUM SERPL-SCNC: 4.3 MMOL/L (ref 3.6–5.5)
SODIUM SERPL-SCNC: 143 MMOL/L (ref 135–145)
TRIGL SERPL-MCNC: 164 MG/DL (ref 0–149)

## 2017-06-13 PROCEDURE — 82043 UR ALBUMIN QUANTITATIVE: CPT

## 2017-06-13 PROCEDURE — 80048 BASIC METABOLIC PNL TOTAL CA: CPT

## 2017-06-13 PROCEDURE — 36415 COLL VENOUS BLD VENIPUNCTURE: CPT

## 2017-06-13 PROCEDURE — 83036 HEMOGLOBIN GLYCOSYLATED A1C: CPT | Mod: GA

## 2017-06-13 PROCEDURE — 80061 LIPID PANEL: CPT

## 2017-06-13 PROCEDURE — 82570 ASSAY OF URINE CREATININE: CPT

## 2017-06-21 ENCOUNTER — OFFICE VISIT (OUTPATIENT)
Dept: MEDICAL GROUP | Facility: MEDICAL CENTER | Age: 62
End: 2017-06-21
Payer: MEDICARE

## 2017-06-21 VITALS
HEART RATE: 72 BPM | TEMPERATURE: 98.6 F | OXYGEN SATURATION: 94 % | BODY MASS INDEX: 22 KG/M2 | DIASTOLIC BLOOD PRESSURE: 64 MMHG | HEIGHT: 72 IN | WEIGHT: 162.4 LBS | SYSTOLIC BLOOD PRESSURE: 117 MMHG

## 2017-06-21 DIAGNOSIS — R53.81 PHYSICAL DECONDITIONING: ICD-10-CM

## 2017-06-21 DIAGNOSIS — Z79.01 CHRONIC ANTICOAGULATION: ICD-10-CM

## 2017-06-21 DIAGNOSIS — I69.919 COGNITIVE DEFICITS AS LATE EFFECT OF CEREBROVASCULAR DISEASE: ICD-10-CM

## 2017-06-21 DIAGNOSIS — Z89.511 S/P BKA (BELOW KNEE AMPUTATION) UNILATERAL, RIGHT (HCC): ICD-10-CM

## 2017-06-21 DIAGNOSIS — I69.959 HEMIPLEGIA OF DOMINANT SIDE AS LATE EFFECT FOLLOWING CEREBROVASCULAR DISEASE (HCC): ICD-10-CM

## 2017-06-21 DIAGNOSIS — Z86.73 H/O: CVA (CEREBROVASCULAR ACCIDENT): ICD-10-CM

## 2017-06-21 DIAGNOSIS — Z00.00 HEALTH CARE MAINTENANCE: ICD-10-CM

## 2017-06-21 DIAGNOSIS — Z12.11 SCREENING FOR MALIGNANT NEOPLASM OF COLON: ICD-10-CM

## 2017-06-21 DIAGNOSIS — Z00.00 MEDICARE ANNUAL WELLNESS VISIT, SUBSEQUENT: ICD-10-CM

## 2017-06-21 DIAGNOSIS — E78.5 DYSLIPIDEMIA (HIGH LDL; LOW HDL): Chronic | ICD-10-CM

## 2017-06-21 DIAGNOSIS — Z99.3 WHEELCHAIR BOUND: ICD-10-CM

## 2017-06-21 PROBLEM — Z89.519 S/P BKA (BELOW KNEE AMPUTATION) UNILATERAL (HCC): Status: ACTIVE | Noted: 2017-06-21

## 2017-06-21 PROCEDURE — G0439 PPPS, SUBSEQ VISIT: HCPCS | Performed by: INTERNAL MEDICINE

## 2017-06-21 ASSESSMENT — PATIENT HEALTH QUESTIONNAIRE - PHQ9: CLINICAL INTERPRETATION OF PHQ2 SCORE: 0
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
## 2017-06-21 NOTE — MR AVS SNAPSHOT
Drew Melton   2017 10:40 AM   Office Visit   MRN: [id number]    Department:  [facility]   Dept Phone:  [phone]    Description:  Male : 1955   Provider:  Jamari Platt M.D.           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 (CMS-HCC)   [7695760]       Chronic anticoagulation   [150612]       Wheelchair bound   [166427]       Physical deconditioning   [055665]       S/P BKA (below knee amputation) unilateral, right (CMS-HCC)   [593530]         Vital Signs     Blood Pressure Pulse Temperature Height Weight Body Mass Index    117/64 mmHg 72 37 °C (98.6 °F) 1.829 m (6') 73.664 kg (162 lb 6.4 oz) 22.02 kg/m2    Oxygen Saturation Smoking Status                94% Former Smoker          Basic Information     Date Of Birth Sex Race Ethnicity Preferred Language    1955 Male White Non- English      Your appointments     2017 11:30 AM   Established Patient with [facility] EXAM 4   [facility]  (--)    [address]  [place] [address]   [phone]            Oct 25, 2017 12:00 PM   Established Patient with Jamari Platt M.D.   [facility]    [address]  [address]  [place] [address] [phone]           You will be receiving a confirmation call a few days before your appointment from our automated call confirmation system.              Problem List               ICD-10-CM Priority Class Noted - Resolved    Cognitive deficits as late effect of cerebrovascular disease I69.919   Unknown - Present    Hemiplegia of dominant side as late effect following cerebrovascular disease (CMS-HCC) I69.959   Unknown - Present    Dyslipidemia (high LDL; low HDL) (Chronic) E78.4   10/8/2012 - Present    Chronic anticoagulation Z79.01   10/8/2013 - Present    Wheelchair bound Z99.3   3/18/2016 - Present    Health care maintenance Z00.00   9/15/2016 - Present    H/O: CVA (cerebrovascular accident) Z86.73   9/15/2016 - Present    Screening for malignant neoplasm of colon Z12.11   9/15/2016 - Present    Physical deconditioning R53.81   3/15/2017 - Present    S/P BKA (below knee amputation) unilateral (CMS-HCC) Z89.519   6/21/2017 - Present      Health Maintenance        Date Due Completion Dates    COLON CANCER SCREENING ANNUAL FIT 1955 ---    IMM ZOSTER VACCINE 12/20/2016 ---    IMM DTaP/Tdap/Td Vaccine (2 - Td) 4/8/2023 4/8/2013            Current Immunizations     Influenza TIV (IM) 10/8/2013, 10/10/2012    Influenza Vaccine Quad Inj (Preserved) 10/21/2014    Pneumococcal polysaccharide vaccine (PPSV-23) 10/21/2014, 10/8/2012 11:29 AM    Tdap Vaccine 4/8/2013      Below and/or attached are the medications your provider expects you to take. Review all of your home medications and newly ordered medications with your provider and/or pharmacist. Follow medication instructions as directed by your provider and/or pharmacist. Please keep your medication list with you and share with your provider. Update the information when medications are discontinued, doses are changed, or new medications (including over-the-counter products) are added; and carry medication information at all times in the event of emergency situations     Allergies:  No Known Allergies          Medications  Valid as of: June 21, 2017 - 10:48 AM    Generic Name Brand Name Tablet Size Instructions for use     Cholecalciferol (Cap) Vitamin D3 2000 UNIT Take 1 Cap by mouth every day.        Fenofibrate (Tab) TRICOR 48 MG Take 1 Tab by mouth every day.        Lovastatin (Tab) MEVACOR 10 MG Take 1 Tab by mouth every day.        Potassium Chloride Brigitte CR (Tab CR) Kdur 20 MEQ Take 1 Tab by mouth every day.        Warfarin Sodium (Tab) COUMADIN 2.5 MG TAKE ONE-HALF TO ONE TABLET BY MOUTH ONCE DAILY AS DIRECTED BY THE COUMADIN CLINIC        .                 Medicines prescribed today were sent to:     [facility] [address] [place] [address], [place] - [address] [place] [address]    Phone: [phone] Fax: [phone]    Open 24 Hours?: No      Medication refill instructions:       If your prescription bottle indicates you have medication refills left, it is not necessary to call your provider’s office. Please contact your pharmacy and they will refill your medication.    If your prescription bottle indicates you do not have any refills left, you may request refills at any time through one of the following ways: The online valuescope system (except Urgent Care), by calling your provider’s office, or by asking your pharmacy to contact your provider’s office with a refill request. Medication refills are processed only during regular business hours and may not be available until the next business day. Your provider may request additional information or to have a follow-up visit with you prior to refilling your medication.   *Please Note: Medication refills are assigned a new Rx number when refilled electronically. Your pharmacy may indicate that no refills were authorized even though a new prescription for the same medication is available at the pharmacy. Please request the medicine by name with the pharmacy before contacting your provider for a refill.        Your To Do List     Future Labs/Procedures Complete By Expires    OCCULT BLOOD FECES IMMUNOASSAY  As directed 6/22/2018         valuescope Access Code:  [email or web address]  Expires: 7/7/2017  4:21 AM    gauzz  A secure, online tool to manage your health information     [employer]’s gauzz® is a secure, online tool that connects you to your personalized health information from the privacy of your home -- day or night - making it very easy for you to manage your healthcare. Once the activation process is completed, you can even access your medical information using the gauzz hal, which is available for free in the Apple Hal store or Google Play store.     gauzz provides the following levels of access (as shown below):   My Chart Features   [facility] Primary Care Doctor [facility]  Specialists [facility]  Urgent  Care Non-[facility]  Primary Care  Doctor   Email your healthcare team securely and privately 24/7 X X X    Manage appointments: schedule your next appointment; view details of past/upcoming appointments X      Request prescription refills. X      View recent personal medical records, including lab and immunizations X X X X   View health record, including health history, allergies, medications X X X X   Read reports about your outpatient visits, procedures, consult and ER notes X X X X   See your discharge summary, which is a recap of your hospital and/or ER visit that includes your diagnosis, lab results, and care plan. X X       How to register for gauzz:  1. Go to  [email or web address].  2. Click on the Sign Up Now box, which takes you to the New Member Sign Up page. You will need to provide the following information:  a. Enter your gauzz Access Code exactly as it appears at the top of this page. (You will not need to use this code after you’ve completed the sign-up process. If you do not sign up before the expiration date, you must request a new code.)   b. Enter your date of birth.   c. Enter your home email address.   d. Click Submit, and follow the next screen’s instructions.  3. Create a gauzz ID. This will be your gauzz login ID and cannot be  changed, so think of one that is secure and easy to remember.  4. Create a [employer] password. You can change your password at any time.  5. Enter your Password Reset Question and Answer. This can be used at a later time if you forget your password.   6. Enter your e-mail address. This allows you to receive e-mail notifications when new information is available in [employer].  7. Click Sign Up. You can now view your health information.    For assistance activating your [employer] account, call [phone]

## 2017-07-18 ENCOUNTER — ANTICOAGULATION VISIT (OUTPATIENT)
Dept: VASCULAR LAB | Facility: MEDICAL CENTER | Age: 62
End: 2017-07-18
Attending: INTERNAL MEDICINE
Payer: MEDICARE

## 2017-07-18 VITALS — DIASTOLIC BLOOD PRESSURE: 78 MMHG | HEART RATE: 77 BPM | SYSTOLIC BLOOD PRESSURE: 126 MMHG

## 2017-07-18 DIAGNOSIS — Z79.01 CHRONIC ANTICOAGULATION: ICD-10-CM

## 2017-07-18 LAB
INR BLD: 2.7 (ref 0.9–1.2)
INR PPP: 2.7 (ref 2–3.5)

## 2017-07-18 PROCEDURE — 85610 PROTHROMBIN TIME: CPT

## 2017-07-18 PROCEDURE — 99211 OFF/OP EST MAY X REQ PHY/QHP: CPT

## 2017-07-18 NOTE — PROGRESS NOTES
Anticoagulation Summary as of 7/18/2017     INR goal 2.0-3.0   Selected INR 2.7 (7/18/2017)   Maintenance plan 1.25 mg (2.5 mg x 0.5) on Wed; 2.5 mg (2.5 mg x 1) all other days   Weekly total 16.25 mg   Plan last modified Gary Wall, PHARMD (9/30/2015)   Next INR check 10/10/2017   Target end date Indefinite    Indications   Chronic anticoagulation [Z79.01]  Deep vein thrombosis [453.40] (Resolved) [I82.409]  Stroke [434.91] (Resolved) [I63.9]  Deep vein thrombosis (DVT) (CMS-HCC) (Resolved) [I82.409]         Anticoagulation Episode Summary     INR check location [facility]    Preferred lab     Send INR reminders to     Comments       Anticoagulation Care Providers     Provider Role Specialty Phone number    Jamari Platt M.D. Referring Family Medicine [phone]    [facility]   [phone]        Pt remains therapeutic today. Pt is to continue with current warfarin dosing regimen.  Pt denies any unusual s/s of bleeding, bruising, clotting or any changes to diet or medications.  Follow up in 12 weeks.    Alaina Hardin, PHARMD

## 2017-07-18 NOTE — MR AVS SNAPSHOT
Drew Melton   2017 11:30 AM   Anticoagulation Visit   MRN: [id number]    Department:  Vascular Medicine   Dept Phone:  [phone]    Description:  Male : 1955   Provider:  [facility]           Allergies as of 2017     No Known Allergies      You were diagnosed with     Chronic anticoagulation   [531822]         Vital Signs     Blood Pressure Pulse Smoking Status             126/78 mmHg 77 Former Smoker         Basic Information     Date Of Birth Sex Race Ethnicity Preferred Language    1955 Male White Non- English      Your appointments     2017 11:30 AM   Established Patient with [facility] EXAM 4   [facility]  (--)    [address]  [place] [address]   [phone]            Oct 10, 2017 10:00 AM   Established Patient with [facility] EXAM 5   [facility]  (--)    [address]  [place] [address]   [phone]            Oct 25, 2017 12:00 PM   Established Patient with Jamari Platt M.D.   [facility]    [address]  [address]  [place] [address] [phone]           You will be receiving a confirmation call a few days before your appointment from our automated call confirmation system.              Problem List              ICD-10-CM Priority Class Noted - Resolved    Cognitive deficits as late effect of cerebrovascular disease I69.919   Unknown - Present    Hemiplegia of dominant side as late effect following cerebrovascular disease (CMS-HCC) I69.959   Unknown - Present    Dyslipidemia (high LDL; low HDL) (Chronic) E78.4   10/8/2012 - Present    Chronic anticoagulation Z79.01   10/8/2013 - Present    Wheelchair bound Z99.3   3/18/2016 - Present    Health care maintenance Z00.00   9/15/2016 - Present    H/O: CVA (cerebrovascular accident) Z86.73   9/15/2016 - Present    Screening for  malignant neoplasm of colon Z12.11   9/15/2016 - Present    Physical deconditioning R53.81   3/15/2017 - Present    S/P BKA (below knee amputation) unilateral (CMS-HCC) Z89.519   6/21/2017 - Present      Health Maintenance        Date Due Completion Dates    COLON CANCER SCREENING ANNUAL FIT 12/20/2006 ---    IMM ZOSTER VACCINE 12/20/2016 ---    IMM INFLUENZA (1) 9/1/2017 10/21/2014, 10/8/2013, 10/10/2012    COLONOSCOPY 4/8/2023 4/8/2013 (Declined)    Override on 4/8/2013: Patient Declined    IMM DTaP/Tdap/Td Vaccine (2 - Td) 4/8/2023 4/8/2013            Results     POCT Protime      Component    INR    2.7                        Current Immunizations     Influenza TIV (IM) 10/8/2013, 10/10/2012    Influenza Vaccine Quad Inj (Preserved) 10/21/2014    Pneumococcal polysaccharide vaccine (PPSV-23) 10/21/2014, 10/8/2012 11:29 AM    Tdap Vaccine 4/8/2013      Below and/or attached are the medications your provider expects you to take. Review all of your home medications and newly ordered medications with your provider and/or pharmacist. Follow medication instructions as directed by your provider and/or pharmacist. Please keep your medication list with you and share with your provider. Update the information when medications are discontinued, doses are changed, or new medications (including over-the-counter products) are added; and carry medication information at all times in the event of emergency situations     Allergies:  No Known Allergies          Medications  Valid as of: July 18, 2017 - 11:26 AM    Generic Name Brand Name Tablet Size Instructions for use    Cholecalciferol (Cap) Vitamin D3 2000 UNIT Take 1 Cap by mouth every day.        Fenofibrate (Tab) TRICOR 48 MG Take 1 Tab by mouth every day.        Lovastatin (Tab) MEVACOR 10 MG Take 1 Tab by mouth every day.        Potassium Chloride Brigitte CR (Tab CR) Kdur 20 MEQ Take 1 Tab by mouth every day.        Warfarin Sodium (Tab) COUMADIN 2.5 MG TAKE ONE-HALF TO ONE  TABLET BY MOUTH ONCE DAILY AS DIRECTED BY THE COUMADIN CLINIC        .                 Medicines prescribed today were sent to:     [facility] [address] [place] [address], [place] - [address] [place] [address]    Phone: [phone] Fax: [phone]    Open 24 Hours?: No      Medication refill instructions:       If your prescription bottle indicates you have medication refills left, it is not necessary to call your provider’s office. Please contact your pharmacy and they will refill your medication.    If your prescription bottle indicates you do not have any refills left, you may request refills at any time through one of the following ways: The online shopa system (except Urgent Care), by calling your provider’s office, or by asking your pharmacy to contact your provider’s office with a refill request. Medication refills are processed only during regular business hours and may not be available until the next business day. Your provider may request additional information or to have a follow-up visit with you prior to refilling your medication.   *Please Note: Medication refills are assigned a new Rx number when refilled electronically. Your pharmacy may indicate that no refills were authorized even though a new prescription for the same medication is available at the pharmacy. Please request the medicine by name with the pharmacy before contacting your provider for a refill.        Warfarin Dosing Calendar   July 2017 Details    Sun Mon Tue Wed Thu Fri Sat           1                 2               3               4               5               6               7               8                 9               10               11               12               13               14               15                 16               17               18   2.7   2.5 mg   See details      19      1.25 mg         20      2.5 mg         21      2.5 mg         22      2.5 mg           23      2.5  mg         24      2.5 mg         25      2.5 mg         26      1.25 mg         27      2.5 mg         28      2.5 mg         29      2.5 mg           30      2.5 mg         31      2.5 mg               Date Details   07/18 This INR check   INR: 2.7               How to take your warfarin dose     To take:  1.25 mg Take 0.5 of a 2.5 mg tablet.    To take:  2.5 mg Take 1 of the 2.5 mg tablets.           Warfarin Dosing Calendar   August 2017 Details    Sun Mon Tue Wed Thu Fri Sat       1      2.5 mg         2      1.25 mg         3      2.5 mg         4      2.5 mg         5      2.5 mg           6      2.5 mg         7      2.5 mg         8      2.5 mg         9      1.25 mg         10      2.5 mg         11      2.5 mg         12      2.5 mg           13      2.5 mg         14      2.5 mg         15      2.5 mg         16      1.25 mg         17      2.5 mg         18      2.5 mg         19      2.5 mg           20      2.5 mg         21      2.5 mg         22      2.5 mg         23      1.25 mg         24      2.5 mg         25      2.5 mg         26      2.5 mg           27      2.5 mg         28      2.5 mg         29      2.5 mg         30      1.25 mg         31      2.5 mg            Date Details   No additional details            How to take your warfarin dose     To take:  1.25 mg Take 0.5 of a 2.5 mg tablet.    To take:  2.5 mg Take 1 of the 2.5 mg tablets.           Warfarin Dosing Calendar   September 2017 Details    Sun Mon Tue Wed Thu Fri Sat          1      2.5 mg         2      2.5 mg           3      2.5 mg         4      2.5 mg         5      2.5 mg         6      1.25 mg         7      2.5 mg         8      2.5 mg         9      2.5 mg           10      2.5 mg         11      2.5 mg         12      2.5 mg         13      1.25 mg         14      2.5 mg         15      2.5 mg         16      2.5 mg           17      2.5 mg         18      2.5 mg         19      2.5 mg         20      1.25 mg          21      2.5 mg         22      2.5 mg         23      2.5 mg           24      2.5 mg         25      2.5 mg         26      2.5 mg         27      1.25 mg         28      2.5 mg         29      2.5 mg         30      2.5 mg          Date Details   No additional details            How to take your warfarin dose     To take:  1.25 mg Take 0.5 of a 2.5 mg tablet.    To take:  2.5 mg Take 1 of the 2.5 mg tablets.           Warfarin Dosing Calendar   October 2017 Details    Sun Mon Tue Wed Thu Fri Sat     1      2.5 mg         2      2.5 mg         3      2.5 mg         4      1.25 mg         5      2.5 mg         6      2.5 mg         7      2.5 mg           8      2.5 mg         9      2.5 mg         10      2.5 mg         11               12               13               14                 15               16               17               18               19               20               21                 22               23               24               25               26               27               28                 29               30               31                    Date Details   No additional details    Date of next INR:  10/10/2017         How to take your warfarin dose     To take:  1.25 mg Take 0.5 of a 2.5 mg tablet.    To take:  2.5 mg Take 1 of the 2.5 mg tablets.              [employer] Access Code: [email or web address]  Expires: 8/17/2017 11:26 AM    [employer]  A secure, online tool to manage your health information     [employer]® is a secure, online tool that connects you to your personalized health information from the privacy of your home -- day or night - making it very easy for you to manage your healthcare. Once the activation process is completed, you can even access your medical information using the [employer] hal, which is available for free in the Apple Hal store or Google Play store.     [employer] provides the following levels of access (as shown below):   My Chart Features    [facility] Primary Care Doctor [facility]  Specialists [facility]  Urgent  Care Non-[facility]  Primary Care  Doctor   Email your healthcare team securely and privately 24/7 X X X    Manage appointments: schedule your next appointment; view details of past/upcoming appointments X      Request prescription refills. X      View recent personal medical records, including lab and immunizations X X X X   View health record, including health history, allergies, medications X X X X   Read reports about your outpatient visits, procedures, consult and ER notes X X X X   See your discharge summary, which is a recap of your hospital and/or ER visit that includes your diagnosis, lab results, and care plan. X X       How to register for Getourguide:  1. Go to  [email or web address].  2. Click on the Sign Up Now box, which takes you to the New Member Sign Up page. You will need to provide the following information:  a. Enter your Get[employer]guide Access Code exactly as it appears at the top of this page. (You will not need to use this code after you’ve completed the sign-up process. If you do not sign up before the expiration date, you must request a new code.)   b. Enter your date of birth.   c. Enter your home email address.   d. Click Submit, and follow the next screen’s instructions.  3. Create a Getourguide ID. This will be your Getourguide login ID and cannot be changed, so think of one that is secure and easy to remember.  4. Create a [employer] password. You can change your password at any time.  5. Enter your Password Reset Question and Answer. This can be used at a later time if you forget your password.   6. Enter your e-mail address. This allows you to receive e-mail notifications when new information is available in Getourguide.  7. Click Sign Up. You can now view your health information.    For assistance activating your Getourguide account, call [phone]

## 2017-09-05 RX ORDER — WARFARIN SODIUM 2.5 MG/1
TABLET ORAL
Qty: 90 TAB | Refills: 1 | Status: SHIPPED | OUTPATIENT
Start: 2017-09-05 | End: 2018-04-04 | Stop reason: SDUPTHER

## 2017-09-08 ENCOUNTER — TELEPHONE (OUTPATIENT)
Dept: MEDICAL GROUP | Facility: MEDICAL CENTER | Age: 62
End: 2017-09-08

## 2017-09-09 NOTE — TELEPHONE ENCOUNTER
After hours phone call  Call on 9/8/2017 at 8:08 PM from Eris Jason on behalf of Drew Melton [phone] (home)  who reports PCP: Jamari Platt M.D.    Pt reports: pharmacy has sent in 2 refill requests for Klorcon, Fenofibrate and Lovastatin and these have not been refilled. The patient only has enough medicine to get him through Tuesday, 9/12/17.     Plan: I am sending this note directly to the patient's primary care physician Dr. Lindo requesting that the Klorcon, Fenofibrate and Lovastatin be refilled on Monday , 9/11/17.     Matehw Byrnes M.D.

## 2017-09-10 DIAGNOSIS — E78.5 DYSLIPIDEMIA (HIGH LDL; LOW HDL): Chronic | ICD-10-CM

## 2017-09-10 DIAGNOSIS — E87.6 HYPOKALEMIA: ICD-10-CM

## 2017-09-10 RX ORDER — POTASSIUM CHLORIDE 20 MEQ/1
20 TABLET, EXTENDED RELEASE ORAL DAILY
Qty: 90 TAB | Refills: 1 | Status: SHIPPED | OUTPATIENT
Start: 2017-09-10 | End: 2018-03-03 | Stop reason: SDUPTHER

## 2017-09-10 RX ORDER — LOVASTATIN 10 MG/1
10 TABLET ORAL DAILY
Qty: 90 TAB | Refills: 3 | Status: SHIPPED | OUTPATIENT
Start: 2017-09-10 | End: 2018-09-01 | Stop reason: SDUPTHER

## 2017-09-10 RX ORDER — FENOFIBRATE 48 MG/1
48 TABLET, COATED ORAL DAILY
Qty: 90 TAB | Refills: 3 | Status: SHIPPED | OUTPATIENT
Start: 2017-09-10 | End: 2018-09-01 | Stop reason: SDUPTHER

## 2017-09-21 ENCOUNTER — OFFICE VISIT (OUTPATIENT)
Dept: MEDICAL GROUP | Facility: MEDICAL CENTER | Age: 62
End: 2017-09-21
Payer: MEDICARE

## 2017-09-21 VITALS
RESPIRATION RATE: 16 BRPM | HEART RATE: 76 BPM | OXYGEN SATURATION: 95 % | HEIGHT: 72 IN | DIASTOLIC BLOOD PRESSURE: 84 MMHG | BODY MASS INDEX: 21.94 KG/M2 | WEIGHT: 162 LBS | TEMPERATURE: 97.8 F | SYSTOLIC BLOOD PRESSURE: 112 MMHG

## 2017-09-21 DIAGNOSIS — Z12.11 SCREENING FOR MALIGNANT NEOPLASM OF COLON: ICD-10-CM

## 2017-09-21 DIAGNOSIS — L30.9 DERMATITIS: ICD-10-CM

## 2017-09-21 PROCEDURE — 99214 OFFICE O/P EST MOD 30 MIN: CPT | Performed by: PHYSICIAN ASSISTANT

## 2017-09-21 RX ORDER — TRIAMCINOLONE ACETONIDE 1 MG/G
1 CREAM TOPICAL 3 TIMES DAILY
Qty: 60 G | Refills: 2 | Status: SHIPPED | OUTPATIENT
Start: 2017-09-21 | End: 2018-01-17

## 2017-09-21 NOTE — ASSESSMENT & PLAN NOTE
Friend states that she he has an appointment next month with his PCP so they will follow-up with the fit testing then.

## 2017-09-21 NOTE — ASSESSMENT & PLAN NOTE
This is a 61-year-old male accompanied by his friend, Eris. He is here today for a rash on his left foot. It's been there for about a month. It's getting worse. He has little sensation in that foot but denies any itching or pain. No known sick contacts. Nothing tried over-the-counter. Denies trauma. Friend is concerned that maybe the sock is causing a problem. Sock is clean regularly.

## 2017-09-22 NOTE — PROGRESS NOTES
Subjective:   Drew Melton is a 61 y.o. male here today for left lower extremity rash specifically the foot. Symptoms for almost a month.    Dermatitis  This is a 61-year-old male accompanied by his friend, Eris. He is here today for a rash on his left foot. It's been there for about a month. It's getting worse. He has little sensation in that foot but denies any itching or pain. No known sick contacts. Nothing tried over-the-counter. Denies trauma. Friend is concerned that maybe the sock is causing a problem. Sock is clean regularly.    Screening for malignant neoplasm of colon  Friend states that she he has an appointment next month with his PCP so they will follow-up with the fit testing then.       Current medicines (including changes today)  Current Outpatient Prescriptions   Medication Sig Dispense Refill   • triamcinolone acetonide (KENALOG) 0.1 % Cream Apply 1 Application to affected area(s) 3 times a day. 60 g 2   • fenofibrate (TRICOR) 48 MG Tab Take 1 Tab by mouth every day. 90 Tab 3   • potassium chloride SA (KLOR-CON M20) 20 MEQ Tab CR Take 1 Tab by mouth every day. 90 Tab 1   • lovastatin (MEVACOR) 10 MG tablet Take 1 Tab by mouth every day. 90 Tab 3   • warfarin (COUMADIN) 2.5 MG Tab TAKE ONE-HALF TO ONE TABLET BY MOUTH ONCE DAILY AS DIRECTED BY THE COUMADIN CLINIC 90 Tab 1   • Cholecalciferol (VITAMIN D3) 2000 UNIT CAPS Take 1 Cap by mouth every day. 90 Cap 3     No current facility-administered medications for this visit.      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    ROS   No chest pain, no shortness of breath, no abdominal pain and all other systems were reviewed and are negative.       Objective:     Blood pressure 112/84,  pulse 76, temperature 36.6 °C (97.8 °F), resp. rate 16, height 1.829 m (6'), weight 73.5 kg (162 lb), SpO2 95 %. Body mass index is 21.97 kg/m².   Physical Exam:  Constitutional: Alert, no distress.  Skin: Warm, dry, good turgor, no rashes in visible areas. On the dorsal aspect of his left foot appears to be erythematous rash in irregular form. Also appears to be some petechia but they do not misti. Nothing in the toes. Soles of the feet are spared. Upper portion of leg around the calf there are some erythematous raised lesions.  Eye: Equal, round and reactive, conjunctiva clear, lids normal.  ENMT: Lips without lesions, good dentition, oropharynx clear.  Neck: Trachea midline, no masses.   Lymph: No cervical or supraclavicular lymphadenopathy  Respiratory: Unlabored respiratory effort, lungs clear to auscultation, no wheezes, no ronchi.  Cardiovascular: Normal S1, S2, no murmur, no edema.  Abdomen: Soft, non-tender, no masses.  Psych: Alert and oriented x3, normal affect and mood.        Assessment and Plan:   The following treatment plan was discussed    1. Dermatitis  Acute, new onset condition. Appears almost to be a venous stasis dermatitis. Prescribed Kenalog 0.1% cream to apply as directed. Follow-up with PCP next month to monitor. May continue to wear sock as needed.  - triamcinolone acetonide (KENALOG) 0.1 % Cream; Apply 1 Application to affected area(s) 3 times a day.  Dispense: 60 g; Refill: 2    2. Screening for malignant neoplasm of colon  Provided envelope for fit testing. Instructions given.  - OCCULT BLOOD FECES IMMUNOASSAY (FIT); Future      Followup: Return if symptoms worsen or fail to improve.    Please note that this dictation was created using voice recognition software. I have made every reasonable attempt to correct obvious errors, but I expect that there are errors of grammar and possibly content that I did not discover before finalizing the note.

## 2017-10-10 ENCOUNTER — ANTICOAGULATION VISIT (OUTPATIENT)
Dept: VASCULAR LAB | Facility: MEDICAL CENTER | Age: 62
End: 2017-10-10
Attending: INTERNAL MEDICINE
Payer: MEDICARE

## 2017-10-10 VITALS — HEART RATE: 78 BPM | DIASTOLIC BLOOD PRESSURE: 81 MMHG | SYSTOLIC BLOOD PRESSURE: 125 MMHG

## 2017-10-10 DIAGNOSIS — Z79.01 CHRONIC ANTICOAGULATION: ICD-10-CM

## 2017-10-10 LAB
INR BLD: 3.8 (ref 0.9–1.2)
INR PPP: 3.8 (ref 2–3.5)

## 2017-10-10 PROCEDURE — 99212 OFFICE O/P EST SF 10 MIN: CPT

## 2017-10-10 PROCEDURE — 85610 PROTHROMBIN TIME: CPT

## 2017-10-10 NOTE — PROGRESS NOTES
OP Anticoagulation Service Note    Date: 10/10/2017  Vitals:    10/10/17 0955   BP: 125/81   Pulse: 78       Anticoagulation Summary  As of 10/10/2017    INR goal:   2.0-3.0   TTR:   65.4 % (2.3 y)   Today's INR:   3.8!   Maintenance plan:   1.25 mg (2.5 mg x 0.5) on Wed; 2.5 mg (2.5 mg x 1) all other days   Weekly total:   16.25 mg   Plan last modified:   Gary Wall, PharmD (9/30/2015)   Next INR check:   10/24/2017   Target end date:   Indefinite    Indications    Chronic anticoagulation [Z79.01]  Deep vein thrombosis [453.40] (Resolved) [I82.409]  Stroke [434.91] (Resolved) [I63.9]  Deep vein thrombosis (DVT) (CMS-HCC) (Resolved) [I82.409]             Anticoagulation Episode Summary     [facility] check [facility]   [facility]    Preferred lab:       Send INR reminders to:       Comments:         Anticoagulation Care Providers     Provider Role Specialty Phone number    Jamari Platt M.D. Referring Family Medicine [phone]    [facility]   [phone]        Anticoagulation Patient Findings      HPI:   Drew Melton seen in clinic today, on anticoagulation therapy with warfarin for hx of DVT and stroke  Confirmed warfarin dosing regimen  Changes to current medical/health status since last appt:   Denies signs/symptoms of bleeding and/or thrombosis since the last appt.    Denies any interval changes to diet  Denies any interval changes to medications since last appt.   Denies any complications or cost restrictions with current therapy.       A/P   INR  supra-therapeutic.   Tomorrow hold dose of warfarin then resume usual regimen    Follow up appointment in 2 week(s).  Ange Link, PharmD

## 2017-10-24 ENCOUNTER — ANTICOAGULATION VISIT (OUTPATIENT)
Dept: VASCULAR LAB | Facility: MEDICAL CENTER | Age: 62
End: 2017-10-24
Attending: INTERNAL MEDICINE
Payer: MEDICARE

## 2017-10-24 DIAGNOSIS — Z79.01 CHRONIC ANTICOAGULATION: ICD-10-CM

## 2017-10-24 LAB — INR PPP: 2.7 (ref 2–3.5)

## 2017-10-24 PROCEDURE — 85610 PROTHROMBIN TIME: CPT

## 2017-10-24 PROCEDURE — 99211 OFF/OP EST MAY X REQ PHY/QHP: CPT

## 2017-10-24 NOTE — PROGRESS NOTES
Anticoagulation Summary  As of 10/24/2017    INR goal:   2.0-3.0   TTR:   64.8 % (2.3 y)   Today's INR:   2.7   Maintenance plan:   1.25 mg (2.5 mg x 0.5) on Wed; 2.5 mg (2.5 mg x 1) all other days   Weekly total:   16.25 mg   Plan last modified:   Gary Wall PharmD (9/30/2015)   Next INR check:   1/2/2018   Target end date:   Indefinite    Indications    Chronic anticoagulation [Z79.01]  Deep vein thrombosis [453.40] (Resolved) [I82.409]  Stroke [434.91] (Resolved) [I63.9]  Deep vein thrombosis (DVT) (CMS-HCC) (Resolved) [I82.409]             Anticoagulation Episode Summary     [facility]   [facility]    Preferred lab:       Send INR reminders to:       Comments:         Anticoagulation Care Providers     Provider Role Specialty Phone number    Jamari Platt M.D. Referring Family Medicine [phone]    [facility]   [phone]        Anticoagulation Patient Findings      HPI:  Drew Melton seen in clinic today, on anticoagulation therapy with warfarin for DVT  Changes to current medical/health status since last appt:   Denies signs/symptoms of bleeding and/or thrombosis since the last appt.    Denies any interval changes to diet  Denies any interval changes to medications since last appt.   Denies any complications or cost restrictions with current therapy.   BP recorded in vitals.    A/P   INR  therapeutic.   Continue weekly warfarin dose as noted      Follow up appointment in 10 week(s) per pt, despite my recommendation.      Gary Wall, [place]    Addendum:    PLEASE PROVIDE BRIDGING INSTRUCTIONS FOR COLONOSCOPY THAT IS PLANNED FOR 1/17/18.  Clearance form sent back to [facility].    Markus Salazar, [place]

## 2017-10-25 ENCOUNTER — OFFICE VISIT (OUTPATIENT)
Dept: MEDICAL GROUP | Facility: MEDICAL CENTER | Age: 62
End: 2017-10-25
Payer: MEDICARE

## 2017-10-25 VITALS
HEART RATE: 73 BPM | BODY MASS INDEX: 21.94 KG/M2 | DIASTOLIC BLOOD PRESSURE: 81 MMHG | OXYGEN SATURATION: 94 % | SYSTOLIC BLOOD PRESSURE: 125 MMHG | HEIGHT: 72 IN | WEIGHT: 162 LBS | TEMPERATURE: 98 F

## 2017-10-25 DIAGNOSIS — Z23 NEEDS FLU SHOT: ICD-10-CM

## 2017-10-25 DIAGNOSIS — Z00.00 HEALTH CARE MAINTENANCE: ICD-10-CM

## 2017-10-25 DIAGNOSIS — L03.032 PARONYCHIA OF GREAT TOE, LEFT: ICD-10-CM

## 2017-10-25 PROCEDURE — G0008 ADMIN INFLUENZA VIRUS VAC: HCPCS | Performed by: INTERNAL MEDICINE

## 2017-10-25 PROCEDURE — 90686 IIV4 VACC NO PRSV 0.5 ML IM: CPT | Performed by: INTERNAL MEDICINE

## 2017-10-25 PROCEDURE — 99214 OFFICE O/P EST MOD 30 MIN: CPT | Mod: 25 | Performed by: INTERNAL MEDICINE

## 2017-10-25 RX ORDER — CLINDAMYCIN HYDROCHLORIDE 300 MG/1
300 CAPSULE ORAL 3 TIMES DAILY
Qty: 30 CAP | Refills: 0 | Status: SHIPPED | OUTPATIENT
Start: 2017-10-25 | End: 2018-01-02

## 2017-10-25 ASSESSMENT — PATIENT HEALTH QUESTIONNAIRE - PHQ9
SUM OF ALL RESPONSES TO PHQ9 QUESTIONS 1 AND 2: 0
2. FEELING DOWN, DEPRESSED, IRRITABLE, OR HOPELESS: 0
1. LITTLE INTEREST OR PLEASURE IN DOING THINGS: 0
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
## 2017-10-26 LAB — INR BLD: 2.7 (ref 0.9–1.2)

## 2017-11-01 ENCOUNTER — HOSPITAL ENCOUNTER (OUTPATIENT)
Facility: MEDICAL CENTER | Age: 62
End: 2017-11-01
Attending: PHYSICIAN ASSISTANT
Payer: MEDICARE

## 2017-11-01 PROCEDURE — 82274 ASSAY TEST FOR BLOOD FECAL: CPT

## 2017-11-02 DIAGNOSIS — Z12.11 SCREENING FOR MALIGNANT NEOPLASM OF COLON: ICD-10-CM

## 2017-11-04 LAB — HEMOCCULT STL QL IA: POSITIVE

## 2017-11-05 DIAGNOSIS — R19.5 FECAL OCCULT BLOOD TEST POSITIVE: ICD-10-CM

## 2017-11-06 ENCOUNTER — TELEPHONE (OUTPATIENT)
Dept: MEDICAL GROUP | Facility: MEDICAL CENTER | Age: 62
End: 2017-11-06

## 2017-11-06 NOTE — TELEPHONE ENCOUNTER
----- Message from Mark Walton P.A.-C. sent at 11/5/2017  5:44 PM PST -----  Please contact Drew.  He has blood in his stool.  I have referred him to GI to discuss options.  Thank you.    Mark

## 2017-11-06 NOTE — TELEPHONE ENCOUNTER
Phone Number Called: [phone] (home)       Message: Patient informed of msg.     Left Message for patient to call back: yes

## 2018-01-02 ENCOUNTER — ANTICOAGULATION VISIT (OUTPATIENT)
Dept: VASCULAR LAB | Facility: MEDICAL CENTER | Age: 63
End: 2018-01-02
Attending: INTERNAL MEDICINE
Payer: MEDICARE

## 2018-01-02 DIAGNOSIS — Z79.01 CHRONIC ANTICOAGULATION: ICD-10-CM

## 2018-01-02 LAB — INR PPP: 3.1 (ref 2–3.5)

## 2018-01-02 PROCEDURE — 85610 PROTHROMBIN TIME: CPT

## 2018-01-02 PROCEDURE — 99213 OFFICE O/P EST LOW 20 MIN: CPT

## 2018-01-02 NOTE — PROGRESS NOTES
OP Anticoagulation Service Note    Date: 1/2/2018  There were no vitals filed for this visit.    Anticoagulation Summary  As of 1/2/2018    INR goal:   2.0-3.0   TTR:   65.6 % (2.5 y)   Today's INR:   3.1!   Maintenance plan:   1.25 mg (2.5 mg x 0.5) on Wed; 2.5 mg (2.5 mg x 1) all other days   Weekly total:   16.25 mg   Plan last modified:   Gary Wall, PharmD (9/30/2015)   Next INR check:   1/22/2018   Target end date:   Indefinite    Indications    Chronic anticoagulation [Z79.01]  Deep vein thrombosis [453.40] (Resolved) [I82.409]  Stroke [434.91] (Resolved) [I63.9]  Deep vein thrombosis (DVT) (CMS-HCC) (Resolved) [I82.409]             Anticoagulation Episode Summary     [facility]   [facility]    Preferred lab:       Send INR reminders to:       Comments:         Anticoagulation Care Providers     Provider Role Specialty Phone number    Jamari Platt M.D. Referring Family Medicine [phone]    [facility]   [phone]        Anticoagulation Patient Findings      HPI:   Drew Melton seen in clinic today, they are here today for a INR check on anticoagulation therapy with warfarin because they have a PMH of DVTs and stroke    The reason for today's visit is to prevent morbidity and mortality from a  blood clot and to reduce the risk of bleeding while on a anticoagulant.     Reason for today's visit (per our collaborative practice policy) is because their last INR was  2.7 on 10/24.  Intervention at the last visit:none needed     Additional education provided today regarding reducing bleed risk and dietary constraints: yes about lovenox.     Any upcoming  procedures: BRIDGING INSTRUCTIONS FOR COLONOSCOPY THAT IS PLANNED FOR 1/17/18.    Confirmed warfarin dosing regimen  Interval Changes with foods rich in vitamin K:No  Interval Changes in ETOH:  No  Interval Changes in smoking status: No  Interval Changes in medication: No   Cost restriction: No    S/S  of bleeding or bruising:  No  Signs/symptoms  thrombosis since the last appt: No  Bleed risk is: moderate,     3 vitals included with today's appt:No  Too many layers     Assessment:   INR  supra-therapeutic.     Possible reason(s) INR is not in range today:   Unknown etiology    Intervention required today to prevent:    Bleeding complications      He will also need to use Lovenox for a colonoscopy on 1/17/2018 as he is high risk with a stroke, and DVTs.     CrCl was WNL on his last labs     Plan:  1.25mg today then continue weekly warfarin dose as noted    1/12 stop warfarin until after procedure  1/13 start Lovenox 120mg subcutaneously  once daily   1/15 last dose of Lovenox   1/17 procedure, restart warfarin within 24hrs  (if approved by MD)   1/19 restart Lovenox (if approved by MD)  1/22 next coagulation clinic appt.     Follow up:  Follow up appointment in 2 week(s) because     Other info:  Pt educated to contact our clinic with any changes in medications or s/s of bleeding or thrombosis    CHEST guidelines recommend frequent INR monitoring at regular intervals (a few days up to a max of 12 weeks) to ensure they are on the proper dose of warfarin and not having any complications from therapy.  INRs can dramatically change over a short time period due to diet, medications, and medical conditions.

## 2018-01-03 LAB — INR BLD: 3.1 (ref 0.9–1.2)

## 2018-01-17 ENCOUNTER — APPOINTMENT (OUTPATIENT)
Dept: ADMISSIONS | Facility: MEDICAL CENTER | Age: 63
End: 2018-01-17
Attending: INTERNAL MEDICINE
Payer: MEDICARE

## 2018-01-17 ENCOUNTER — ANTICOAGULATION MONITORING (OUTPATIENT)
Dept: VASCULAR LAB | Facility: MEDICAL CENTER | Age: 63
End: 2018-01-17

## 2018-01-17 DIAGNOSIS — Z79.01 CHRONIC ANTICOAGULATION: ICD-10-CM

## 2018-01-18 NOTE — PROGRESS NOTES
Patient called to say colonoscopy had been rescheduled.  He has not stopped warfarin yet.  Updated bridging schedule below.    1/19:  NO warfarin  1/20-1/22:  NO warfarin, lovenox 120mg one time daily  1/23:  NO warfarin, NO lovenox  1/24:  Restart warfarin at physician discretion, no lovenox  1/25 and continuing:  Warfarin and lovenox daily until INR >2.0.  1/29:  Check INR in clinic (appt scheduled)    Patient able to repeat instructions and verbalizes understanding.    Elias Peña, PharmD

## 2018-01-22 ENCOUNTER — OFFICE VISIT (OUTPATIENT)
Dept: MEDICAL GROUP | Facility: MEDICAL CENTER | Age: 63
End: 2018-01-22
Payer: MEDICARE

## 2018-01-22 ENCOUNTER — APPOINTMENT (OUTPATIENT)
Dept: MEDICAL GROUP | Facility: MEDICAL CENTER | Age: 63
End: 2018-01-22
Payer: MEDICARE

## 2018-01-22 VITALS
SYSTOLIC BLOOD PRESSURE: 122 MMHG | WEIGHT: 161 LBS | TEMPERATURE: 99.1 F | OXYGEN SATURATION: 95 % | BODY MASS INDEX: 21.81 KG/M2 | DIASTOLIC BLOOD PRESSURE: 72 MMHG | HEART RATE: 74 BPM | HEIGHT: 72 IN

## 2018-01-22 DIAGNOSIS — R21 SKIN RASH: ICD-10-CM

## 2018-01-22 DIAGNOSIS — Z99.3 WHEELCHAIR BOUND: ICD-10-CM

## 2018-01-22 DIAGNOSIS — E78.5 DYSLIPIDEMIA (HIGH LDL; LOW HDL): Chronic | ICD-10-CM

## 2018-01-22 DIAGNOSIS — I69.919 COGNITIVE DEFICITS AS LATE EFFECT OF CEREBROVASCULAR DISEASE: ICD-10-CM

## 2018-01-22 DIAGNOSIS — Z86.73 H/O: CVA (CEREBROVASCULAR ACCIDENT): ICD-10-CM

## 2018-01-22 DIAGNOSIS — R19.5 POSITIVE FIT (FECAL IMMUNOCHEMICAL TEST): ICD-10-CM

## 2018-01-22 DIAGNOSIS — Z79.01 CHRONIC ANTICOAGULATION: ICD-10-CM

## 2018-01-22 DIAGNOSIS — R73.01 IFG (IMPAIRED FASTING GLUCOSE): ICD-10-CM

## 2018-01-22 DIAGNOSIS — I69.959 HEMIPLEGIA OF DOMINANT SIDE AS LATE EFFECT FOLLOWING CEREBROVASCULAR DISEASE (HCC): ICD-10-CM

## 2018-01-22 DIAGNOSIS — R53.81 PHYSICAL DECONDITIONING: ICD-10-CM

## 2018-01-22 PROCEDURE — 99214 OFFICE O/P EST MOD 30 MIN: CPT | Performed by: INTERNAL MEDICINE

## 2018-01-22 RX ORDER — TRIAMCINOLONE ACETONIDE 1 MG/G
CREAM TOPICAL 2 TIMES DAILY
COMMUNITY
End: 2018-01-22 | Stop reason: SDUPTHER

## 2018-01-22 RX ORDER — TRIAMCINOLONE ACETONIDE 1 MG/G
1 CREAM TOPICAL 2 TIMES DAILY
Qty: 1 TUBE | Refills: 5 | Status: SHIPPED | OUTPATIENT
Start: 2018-01-22 | End: 2019-03-12

## 2018-01-22 ASSESSMENT — PAIN SCALES - GENERAL: PAINLEVEL: NO PAIN
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
## 2018-01-23 DIAGNOSIS — Z86.718 HISTORY OF DVT (DEEP VEIN THROMBOSIS): ICD-10-CM

## 2018-01-24 ENCOUNTER — HOSPITAL ENCOUNTER (OUTPATIENT)
Facility: MEDICAL CENTER | Age: 63
End: 2018-01-24
Attending: INTERNAL MEDICINE | Admitting: INTERNAL MEDICINE
Payer: MEDICARE

## 2018-01-24 VITALS
WEIGHT: 160 LBS | HEIGHT: 72 IN | OXYGEN SATURATION: 90 % | TEMPERATURE: 97.7 F | DIASTOLIC BLOOD PRESSURE: 82 MMHG | RESPIRATION RATE: 14 BRPM | SYSTOLIC BLOOD PRESSURE: 144 MMHG | HEART RATE: 100 BPM | BODY MASS INDEX: 21.67 KG/M2

## 2018-01-24 LAB
ANION GAP SERPL CALC-SCNC: 13 MMOL/L (ref 0–11.9)
BUN SERPL-MCNC: 13 MG/DL (ref 8–22)
CALCIUM SERPL-MCNC: 10.1 MG/DL (ref 8.5–10.5)
CHLORIDE SERPL-SCNC: 103 MMOL/L (ref 96–112)
CO2 SERPL-SCNC: 24 MMOL/L (ref 20–33)
CREAT SERPL-MCNC: 0.8 MG/DL (ref 0.5–1.4)
EKG IMPRESSION: NORMAL
ERYTHROCYTE [DISTWIDTH] IN BLOOD BY AUTOMATED COUNT: 44.4 FL (ref 35.9–50)
GLUCOSE SERPL-MCNC: 77 MG/DL (ref 65–99)
HCT VFR BLD AUTO: 45.7 % (ref 42–52)
HGB BLD-MCNC: 15.3 G/DL (ref 14–18)
INR PPP: 1.11 (ref 0.87–1.13)
MCH RBC QN AUTO: 30 PG (ref 27–33)
MCHC RBC AUTO-ENTMCNC: 33.5 G/DL (ref 33.7–35.3)
MCV RBC AUTO: 89.6 FL (ref 81.4–97.8)
PLATELET # BLD AUTO: 163 K/UL (ref 164–446)
PMV BLD AUTO: 9.1 FL (ref 9–12.9)
POTASSIUM SERPL-SCNC: 3.6 MMOL/L (ref 3.6–5.5)
PROTHROMBIN TIME: 14 SEC (ref 12–14.6)
RBC # BLD AUTO: 5.1 M/UL (ref 4.7–6.1)
SODIUM SERPL-SCNC: 140 MMOL/L (ref 135–145)
WBC # BLD AUTO: 5 K/UL (ref 4.8–10.8)

## 2018-01-24 PROCEDURE — 160046 HCHG PACU - 1ST 60 MINS PHASE II: Performed by: INTERNAL MEDICINE

## 2018-01-24 PROCEDURE — 700101 HCHG RX REV CODE 250: Mod: JW

## 2018-01-24 PROCEDURE — 160025 RECOVERY II MINUTES (STATS): Performed by: INTERNAL MEDICINE

## 2018-01-24 PROCEDURE — 160048 HCHG OR STATISTICAL LEVEL 1-5: Performed by: INTERNAL MEDICINE

## 2018-01-24 PROCEDURE — 160204 HCHG ENDO MINUTES - 1ST 30 MINS LEVEL 5: Performed by: INTERNAL MEDICINE

## 2018-01-24 PROCEDURE — 160035 HCHG PACU - 1ST 60 MINS PHASE I: Performed by: INTERNAL MEDICINE

## 2018-01-24 PROCEDURE — 160009 HCHG ANES TIME/MIN: Performed by: INTERNAL MEDICINE

## 2018-01-24 PROCEDURE — 85027 COMPLETE CBC AUTOMATED: CPT

## 2018-01-24 PROCEDURE — 93010 ELECTROCARDIOGRAM REPORT: CPT | Performed by: INTERNAL MEDICINE

## 2018-01-24 PROCEDURE — 160209 HCHG ENDO MINUTES - EA ADDL 1 MIN LEVEL 5: Performed by: INTERNAL MEDICINE

## 2018-01-24 PROCEDURE — 700111 HCHG RX REV CODE 636 W/ 250 OVERRIDE (IP)

## 2018-01-24 PROCEDURE — 85610 PROTHROMBIN TIME: CPT

## 2018-01-24 PROCEDURE — 80048 BASIC METABOLIC PNL TOTAL CA: CPT

## 2018-01-24 PROCEDURE — 160002 HCHG RECOVERY MINUTES (STAT): Performed by: INTERNAL MEDICINE

## 2018-01-24 PROCEDURE — 93005 ELECTROCARDIOGRAM TRACING: CPT | Performed by: INTERNAL MEDICINE

## 2018-01-24 RX ORDER — LIDOCAINE HYDROCHLORIDE 10 MG/ML
0.5 INJECTION, SOLUTION INFILTRATION; PERINEURAL
Status: DISCONTINUED | OUTPATIENT
Start: 2018-01-24 | End: 2018-01-24 | Stop reason: HOSPADM

## 2018-01-24 RX ORDER — SODIUM CHLORIDE, SODIUM LACTATE, POTASSIUM CHLORIDE, CALCIUM CHLORIDE 600; 310; 30; 20 MG/100ML; MG/100ML; MG/100ML; MG/100ML
INJECTION, SOLUTION INTRAVENOUS CONTINUOUS
Status: DISCONTINUED | OUTPATIENT
Start: 2018-01-24 | End: 2018-01-24 | Stop reason: HOSPADM

## 2018-01-24 RX ORDER — LIDOCAINE AND PRILOCAINE 25; 25 MG/G; MG/G
1 CREAM TOPICAL
Status: DISCONTINUED | OUTPATIENT
Start: 2018-01-24 | End: 2018-01-24 | Stop reason: HOSPADM

## 2018-01-24 RX ADMIN — SODIUM CHLORIDE, SODIUM LACTATE, POTASSIUM CHLORIDE, CALCIUM CHLORIDE: 600; 310; 30; 20 INJECTION, SOLUTION INTRAVENOUS at 11:15

## 2018-01-24 ASSESSMENT — PAIN SCALES - GENERAL
PAINLEVEL_OUTOF10: 0

## 2018-01-24 NOTE — OR SURGEON
Immediate Post OP Note    PreOp Diagnosis:+ FIT tst  PostOp Diagnosis: Mild internal hemorrhoids  Procedure(s):  COLONOSCOPY - Wound Class: Contaminated    Surgeon(s):  Lenin Danielle M.D.    Anesthesiologist/Type of Anesthesia:  Anesthesiologist: Francisco Javier Marsh M.D./General    Surgical Staff:  Circulator: Amelie Carlisle R.N.  Endoscopy Technician: Dione Tao    Specimens:  * No specimens in log *    Estimated Blood Loss: NA  Findings:   Mild internal hemorrhoids  Complications:   None immediate    Rec:  Resume diet  Recall colon in 10 years  Resume coumadin today per PCP instructions      1/24/2018 1:27 PM Lenin Danielle

## 2018-01-24 NOTE — PROCEDURES
DATE OF SERVICE:  01/24/2018    PROCEDURE:  Colonoscopy.    PREOPERATIVE DIAGNOSIS:  Positive FIT test.    POSTOPERATIVE DIAGNOSIS:  Internal hemorrhoids, mild.    CONSENT:  Written and informed consent was obtained.    PROCEDURE:  Colonoscopy, diagnostic.    ANESTHESIA:  General anesthesia per anesthesia department with endotracheal   intubation given stroke sequelae.    PROCEDURE:  The patient was stable from a cardiovascular and pulmonary   perspective.  The sedation was induced.  Endotracheal intubation was in place.    The patient was positioned in the left lateral decubitus.  The regular adult   colonoscope was advanced without difficulty from the anus to the cecum.    Ileocecal valve and appendiceal orifice were identified and photographed.  The   scope was slowly withdrawn.  There was retroflexion in the rectum before   withdrawal.  The patient tolerated the procedure well with no immediate   complications occurred.  The quality of bowel preparation was good.    FINDINGS:  Normal colorectal mucosa except for internal hemorrhoids upon   retroflexion.    RECOMMENDATION:  Resume Coumadin.  Follow up screening colonoscopy in 10 years   or as clinically indicated.       ____________________________________     Lenin Garcia MD EMD / MARLENE    DD:  01/24/2018 13:25:19  DT:  01/24/2018 15:42:36    D#:  [id number]  Job#:  [id number]    cc: AKASH LANDEROS MD, _____ _____

## 2018-01-24 NOTE — DISCHARGE INSTRUCTIONS
ACTIVITY: Rest and take it easy for the first 24 hours.  A responsible adult is recommended to remain with you during that time.  It is normal to feel sleepy.  We encourage you to not do anything that requires balance, judgment or coordination.    MILD FLU-LIKE SYMPTOMS ARE NORMAL. YOU MAY EXPERIENCE GENERALIZED MUSCLE ACHES, THROAT IRRITATION, HEADACHE AND/OR SOME NAUSEA.    FOR 24 HOURS DO NOT:  Drive, operate machinery or run household appliances.  Drink beer or alcoholic beverages.   Make important decisions or sign legal documents.    SPECIAL INSTRUCTIONS:   1. Advance to regular previous diet  2.  OK to resume coumadin per PCP instructions today  4. Follow-up colonoscopy in 10 years    Thanks!    DIET: To avoid nausea, slowly advance diet as tolerated, avoiding spicy or greasy foods for the first day.  Add more substantial food to your diet according to your physician's instructions.  Babies can be fed formula or breast milk as soon as they are hungry.  INCREASE FLUIDS AND FIBER TO AVOID CONSTIPATION.    SURGICAL DRESSING/BATHING: *may shower or bathe as usual    FOLLOW-UP APPOINTMENT:  A follow-up appointment should be arranged with your doctor.  Call to schedule.    You should CALL YOUR PHYSICIAN if you develop:  Fever greater than 101 degrees F.  Pain not relieved by medication, or persistent nausea or vomiting.  Excessive bleeding (blood soaking through dressing) or unexpected drainage from the wound.  Extreme redness or swelling around the incision site, drainage of pus or foul smelling drainage.  Inability to urinate or empty your bladder within 8 hours.  Problems with breathing or chest pain.    You should call 911 if you develop problems with breathing or chest pain.  If you are unable to contact your doctor or surgical center, you should go to the nearest emergency room or urgent care center.  Physician's telephone #: *Dr. Gonzales [phone]    If any questions arise, call your doctor.  If your  doctor is not available, please feel free to call the Surgical Center at [phone].  The Center is open Monday through Friday from 7AM to 7PM.  You can also call the HEALTH HOTLINE open 24 hours/day, 7 days/week and speak to a nurse at [phone], or toll free at [phone].    A registered nurse may call you a few days after your surgery to see how you are doing after your procedure.    MEDICATIONS: Resume taking daily medication.  Take prescribed pain medication with food.  If no medication is prescribed, you may take non-aspirin pain medication if needed.  PAIN MEDICATION CAN BE VERY CONSTIPATING.  Take a stool softener or laxative such as senokot, pericolace, or milk of magnesia if needed.    No prescription given.      If your physician has prescribed pain medication that includes Acetaminophen (Tylenol), do not take additional Acetaminophen (Tylenol) while taking the prescribed medication.    Depression / Suicide Risk    As you are discharged from this [facility] facility, it is important to learn how to keep safe from harming yourself.    Recognize the warning signs:  · Abrupt changes in personality, positive or negative- including increase in energy   · Giving away possessions  · Change in eating patterns- significant weight changes-  positive or negative  · Change in sleeping patterns- unable to sleep or sleeping all the time   · Unwillingness or inability to communicate  · Depression  · Unusual sadness, discouragement and loneliness  · Talk of wanting to die  · Neglect of personal appearance   · Rebelliousness- reckless behavior  · Withdrawal from people/activities they love  · Confusion- inability to concentrate     If you or a loved one observes any of these behaviors or has concerns about self-harm, here's what you can do:  · Talk about it- your feelings and reasons for harming yourself  · Remove any means that you might use to hurt yourself (examples: pills, rope, extension cords,  firearm)  · Get professional help from the community (Mental Health, Substance Abuse, psychological counseling)  · Do not be alone:Call your Safe Contact- someone whom you trust who will be there for you.  · Call your local CRISIS HOTLINE 114-2289 or 500-745-3954  · Call your local Children's Mobile Crisis Response Team [place] (256) 281-7940 or www.[employer]  · Call the toll free National Suicide Prevention Hotlines   · National Suicide Prevention Lifeline 972-156-XZFY (6715)  · National Hope Line Network 800-SUICIDE (472-4425)

## 2018-01-29 ENCOUNTER — ANTICOAGULATION VISIT (OUTPATIENT)
Dept: VASCULAR LAB | Facility: MEDICAL CENTER | Age: 63
End: 2018-01-29
Attending: INTERNAL MEDICINE
Payer: MEDICARE

## 2018-01-29 DIAGNOSIS — Z79.01 CHRONIC ANTICOAGULATION: ICD-10-CM

## 2018-01-29 LAB
INR BLD: 1.6 (ref 0.9–1.2)
INR PPP: 1.6 (ref 2–3.5)

## 2018-01-29 PROCEDURE — 99212 OFFICE O/P EST SF 10 MIN: CPT | Performed by: NURSE PRACTITIONER

## 2018-01-29 PROCEDURE — 85610 PROTHROMBIN TIME: CPT

## 2018-01-29 NOTE — PROGRESS NOTES
Anticoagulation Summary  As of 1/29/2018    INR goal:   2.0-3.0   TTR:   64.9 % (2.6 y)   Today's INR:   1.6!   Maintenance plan:   1.25 mg (2.5 mg x 0.5) on Wed; 2.5 mg (2.5 mg x 1) all other days   Weekly total:   16.25 mg   Plan last modified:   Gary Wall, PharmD (9/30/2015)   Next INR check:   2/7/2018   Target end date:   Indefinite    Indications    Chronic anticoagulation [Z79.01]  Deep vein thrombosis [453.40] (Resolved) [I82.409]  Stroke [434.91] (Resolved) [I63.9]  Deep vein thrombosis (DVT) (CMS-HCC) (Resolved) [I82.409]             Anticoagulation Episode Summary     INR check location:   [facility]    Preferred lab:       Send INR reminders to:       Comments:         Anticoagulation Care Providers     Provider Role Specialty Phone number    Jamari Platt M.D. Referring [facility] Medicine [phone]    [facility]   [phone]        Anticoagulation Patient Findings      HPI:  Drew Melton seen in clinic today for follow up on anticoagulation therapy in the presence of DVT, CVA hx. Had colonoscopy on 1/24/18. Restarted warfarin that night but did not restart Lovenox. Denies any medication or diet changes. No current symptoms of bleeding or thrombosis reported.    A/P:   INR subtherapeutic. Will have patient restart Lovenox and increase warfarin doses for the next 2 nights. Pt unable to follow up until next week. Will have him administer 3 daily injections then stop. He knows to go to the ER for any s/sx of stroke.    Follow up appointment in 1 week(s).    Next Appointment: Wednesday, February 7, 2018 at 2:15 pm.     Khushboo FREED

## 2018-02-06 ENCOUNTER — OFFICE VISIT (OUTPATIENT)
Dept: MEDICAL GROUP | Facility: MEDICAL CENTER | Age: 63
End: 2018-02-06
Payer: MEDICARE

## 2018-02-06 ENCOUNTER — APPOINTMENT (OUTPATIENT)
Dept: PHYSICAL THERAPY | Facility: REHABILITATION | Age: 63
End: 2018-02-06
Attending: INTERNAL MEDICINE
Payer: MEDICARE

## 2018-02-06 VITALS
HEIGHT: 72 IN | DIASTOLIC BLOOD PRESSURE: 68 MMHG | BODY MASS INDEX: 21.81 KG/M2 | HEART RATE: 71 BPM | OXYGEN SATURATION: 96 % | SYSTOLIC BLOOD PRESSURE: 118 MMHG | WEIGHT: 161 LBS | TEMPERATURE: 97.8 F

## 2018-02-06 DIAGNOSIS — Z86.73 H/O: CVA (CEREBROVASCULAR ACCIDENT): ICD-10-CM

## 2018-02-06 DIAGNOSIS — R53.81 PHYSICAL DECONDITIONING: ICD-10-CM

## 2018-02-06 DIAGNOSIS — I69.959 HEMIPLEGIA OF DOMINANT SIDE AS LATE EFFECT FOLLOWING CEREBROVASCULAR DISEASE (HCC): ICD-10-CM

## 2018-02-06 DIAGNOSIS — Z99.3 WHEELCHAIR BOUND: ICD-10-CM

## 2018-02-06 DIAGNOSIS — Z89.519 S/P BKA (BELOW KNEE AMPUTATION) UNILATERAL (HCC): ICD-10-CM

## 2018-02-06 PROCEDURE — 99214 OFFICE O/P EST MOD 30 MIN: CPT | Performed by: INTERNAL MEDICINE

## 2018-02-06 ASSESSMENT — PAIN SCALES - GENERAL: PAINLEVEL: NO PAIN

## 2018-02-07 ENCOUNTER — ANTICOAGULATION VISIT (OUTPATIENT)
Dept: VASCULAR LAB | Facility: MEDICAL CENTER | Age: 63
End: 2018-02-07
Attending: INTERNAL MEDICINE
Payer: MEDICARE

## 2018-02-07 DIAGNOSIS — Z79.01 CHRONIC ANTICOAGULATION: ICD-10-CM

## 2018-02-07 LAB
INR BLD: 2.5 (ref 0.9–1.2)
INR PPP: 2.5 (ref 2–3.5)

## 2018-02-07 PROCEDURE — 99211 OFF/OP EST MAY X REQ PHY/QHP: CPT | Performed by: PHARMACIST

## 2018-02-07 PROCEDURE — 85610 PROTHROMBIN TIME: CPT

## 2018-02-07 NOTE — PROGRESS NOTES
Anticoagulation Summary  As of 2/7/2018    INR goal:   2.0-3.0   TTR:   64.8 % (2.6 y)   Today's INR:   2.5   Maintenance plan:   1.25 mg (2.5 mg x 0.5) on Wed; 2.5 mg (2.5 mg x 1) all other days   Weekly total:   16.25 mg   Plan last modified:   Gary Wall, PharmD (9/30/2015)   Next INR check:   4/4/2018   Target end date:   Indefinite    Indications    Chronic anticoagulation [Z79.01]  Deep vein thrombosis [453.40] (Resolved) [I82.409]  Stroke [434.91] (Resolved) [I63.9]  Deep vein thrombosis (DVT) (CMS-HCC) (Resolved) [I82.409]             Anticoagulation Episode Summary     INR check location:   [facility]    Preferred lab:       Send INR reminders to:       Comments:         Anticoagulation Care Providers     Provider Role Specialty Phone number    Jamari Platt M.D. Referring Family Medicine [phone]    [facility]   [phone]        Anticoagulation Patient Findings  Patient Findings     Negatives:   Signs/symptoms of thrombosis, Signs/symptoms of bleeding, Laboratory test error suspected, Change in health, Change in alcohol use, Change in activity, Upcoming invasive procedure, Emergency department visit, Upcoming dental procedure, Missed doses, Extra doses, Change in medications, Change in diet/appetite, Hospital admission, Bruising, Other complaints        HPI:   Drew Melton seen in clinic today, on anticoagulation therapy with warfarin for blood clot/stroke prevention due to history of DVT and stroke.    Patient's previous INR was therapeutic at 2.5 on 2-7-18, at which time patient was instructed to continue current warfarin regimen.  He returns to clinic today to recheck INR to ensure it is therapeutic and thus preventing possible clotting and/or bleeding/bruising complications.    CHADS-VASc = n/a  (unadjusted ischemic stroke risk/year:  n/a)    Does patient have any changes to current medical/health status since last appt (Y/N):  NO  Does patient have any  "signs/symptoms of bleeding and/or thrombosis since the last appt (Y/N):  NO  Does patient have any interval changes to diet or medications since last appt (Y/N):  NO  Are there any complications or cost restrictions with current therapy (Y/N):  NO      Vitals:  patient declines BP/vitals check at today's visit     Asssessment:      INR therapeutic at 2.5, therefore decreasing patient's risk of blood clots and/or bleeding complications   Reason(s) for out of range INR today:  n/a      Plan:  Pt is to continue with current warfarin dosing regimen.     Follow up:  Because warfarin is a high risk medication and current CHEST guidelines recommend regular monitoring intervals (few days up to 12 weeks), will have patient return to clinic in 8 weeks to recheck INR.  Patient's \"friend\"/caregiver who accompanies patient today refuses to return to clinic any sooner the \"max time\" any time INR is therapeutic due to cost.  Explained need for appropriate monitoring and follow, \"friend\" was not interested in discussing further.    Elias Peña, PharmD    "
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
## 2018-02-08 ENCOUNTER — APPOINTMENT (OUTPATIENT)
Dept: PHYSICAL THERAPY | Facility: REHABILITATION | Age: 63
End: 2018-02-08
Attending: INTERNAL MEDICINE
Payer: MEDICARE

## 2018-02-12 ENCOUNTER — APPOINTMENT (OUTPATIENT)
Dept: PHYSICAL THERAPY | Facility: REHABILITATION | Age: 63
End: 2018-02-12
Attending: INTERNAL MEDICINE
Payer: MEDICARE

## 2018-02-14 ENCOUNTER — APPOINTMENT (OUTPATIENT)
Dept: PHYSICAL THERAPY | Facility: REHABILITATION | Age: 63
End: 2018-02-14
Attending: INTERNAL MEDICINE
Payer: MEDICARE

## 2018-02-19 ENCOUNTER — APPOINTMENT (OUTPATIENT)
Dept: PHYSICAL THERAPY | Facility: REHABILITATION | Age: 63
End: 2018-02-19
Attending: INTERNAL MEDICINE
Payer: MEDICARE

## 2018-02-21 ENCOUNTER — PHYSICAL THERAPY (OUTPATIENT)
Dept: PHYSICAL THERAPY | Facility: REHABILITATION | Age: 63
End: 2018-02-21
Attending: INTERNAL MEDICINE
Payer: MEDICARE

## 2018-02-21 NOTE — OP THERAPY EVALUATION
Outpatient Physical Therapy  WHEELCHAIR EVALUATION    [address]  [address]  [place] [address]  Phone:  [phone]  Fax:  [phone]    Date of Evaluation: 02/21/2018    Patient Name: Drew Melton  YOB: 1955  MRN: [id number]   Height: 1.829 m (6') Weight: 73 kg (161 lb)       Referring Provider: Jamari Platt M.D.  [address]  [address]  [place] [address]   Referring Diagnosis H/O: CVA (cerebrovascular accident) [Z86.73];Hemiplegia of dominant side as late effect following cerebrovascular disease (CMS-HCC) [I69.959];Wheelchair bound [Z99.3];Physical deconditioning [R53.81];S/P BKA (below knee amputation) unilateral (CMS-HCC) [Z89.519]           Current level of functional mobility / ADLs: Pt able to use regular wc best           Electronically signed by Lisa Blood PT on 2/21/2018    Referring provider co-signature:  I have reviewed this evaluation and recommendation(s) and my co-signature certifies my agreement with the contents.    Physician Signature: ________________________________ Date: ______________

## 2018-02-26 ENCOUNTER — APPOINTMENT (OUTPATIENT)
Dept: PHYSICAL THERAPY | Facility: REHABILITATION | Age: 63
End: 2018-02-26
Attending: INTERNAL MEDICINE
Payer: MEDICARE

## 2018-02-28 ENCOUNTER — APPOINTMENT (OUTPATIENT)
Dept: OCCUPATIONAL THERAPY | Facility: REHABILITATION | Age: 63
End: 2018-02-28
Attending: INTERNAL MEDICINE
Payer: MEDICARE

## 2018-02-28 ENCOUNTER — APPOINTMENT (OUTPATIENT)
Dept: PHYSICAL THERAPY | Facility: REHABILITATION | Age: 63
End: 2018-02-28
Attending: INTERNAL MEDICINE
Payer: MEDICARE

## 2018-03-03 DIAGNOSIS — E87.6 HYPOKALEMIA: ICD-10-CM

## 2018-03-05 ENCOUNTER — APPOINTMENT (OUTPATIENT)
Dept: OCCUPATIONAL THERAPY | Facility: REHABILITATION | Age: 63
End: 2018-03-05
Payer: MEDICARE

## 2018-03-05 ENCOUNTER — TELEPHONE (OUTPATIENT)
Dept: MEDICAL GROUP | Facility: MEDICAL CENTER | Age: 63
End: 2018-03-05

## 2018-03-05 ENCOUNTER — APPOINTMENT (OUTPATIENT)
Dept: PHYSICAL THERAPY | Facility: REHABILITATION | Age: 63
End: 2018-03-05
Attending: INTERNAL MEDICINE
Payer: MEDICARE

## 2018-03-05 RX ORDER — POTASSIUM CHLORIDE 1500 MG/1
TABLET, EXTENDED RELEASE ORAL
Qty: 90 TAB | Refills: 1 | Status: SHIPPED | OUTPATIENT
Start: 2018-03-05 | End: 2018-09-01 | Stop reason: SDUPTHER

## 2018-03-07 ENCOUNTER — APPOINTMENT (OUTPATIENT)
Dept: OCCUPATIONAL THERAPY | Facility: REHABILITATION | Age: 63
End: 2018-03-07
Payer: MEDICARE

## 2018-03-07 ENCOUNTER — APPOINTMENT (OUTPATIENT)
Dept: PHYSICAL THERAPY | Facility: REHABILITATION | Age: 63
End: 2018-03-07
Attending: INTERNAL MEDICINE
Payer: MEDICARE

## 2018-03-12 ENCOUNTER — APPOINTMENT (OUTPATIENT)
Dept: OCCUPATIONAL THERAPY | Facility: REHABILITATION | Age: 63
End: 2018-03-12
Payer: MEDICARE

## 2018-03-12 ENCOUNTER — APPOINTMENT (OUTPATIENT)
Dept: PHYSICAL THERAPY | Facility: REHABILITATION | Age: 63
End: 2018-03-12
Attending: INTERNAL MEDICINE
Payer: MEDICARE

## 2018-03-14 ENCOUNTER — APPOINTMENT (OUTPATIENT)
Dept: PHYSICAL THERAPY | Facility: REHABILITATION | Age: 63
End: 2018-03-14
Attending: INTERNAL MEDICINE
Payer: MEDICARE

## 2018-03-14 ENCOUNTER — APPOINTMENT (OUTPATIENT)
Dept: OCCUPATIONAL THERAPY | Facility: REHABILITATION | Age: 63
End: 2018-03-14
Payer: MEDICARE

## 2018-04-04 ENCOUNTER — ANTICOAGULATION VISIT (OUTPATIENT)
Dept: VASCULAR LAB | Facility: MEDICAL CENTER | Age: 63
End: 2018-04-04
Attending: INTERNAL MEDICINE
Payer: MEDICARE

## 2018-04-04 DIAGNOSIS — Z79.01 CHRONIC ANTICOAGULATION: ICD-10-CM

## 2018-04-04 LAB — INR PPP: 3.2 (ref 2–3.5)

## 2018-04-04 PROCEDURE — 99212 OFFICE O/P EST SF 10 MIN: CPT | Performed by: PHARMACIST

## 2018-04-04 PROCEDURE — 85610 PROTHROMBIN TIME: CPT

## 2018-04-04 RX ORDER — WARFARIN SODIUM 2.5 MG/1
TABLET ORAL
Qty: 90 TAB | Refills: 3 | Status: SHIPPED | OUTPATIENT
Start: 2018-04-04 | End: 2019-01-23 | Stop reason: SDUPTHER

## 2018-04-04 NOTE — PROGRESS NOTES
Anticoagulation Summary  As of 4/4/2018    INR goal:   2.0-3.0   TTR:   65.2 % (2.8 y)   Today's INR:   3.2!   Maintenance plan:   1.25 mg (2.5 mg x 0.5) on Wed; 2.5 mg (2.5 mg x 1) all other days   Weekly total:   16.25 mg   Plan last modified:   Lyndsey SevillaD (9/30/2015)   Next INR check:   5/2/2018   Target end date:   Indefinite    Indications    Chronic anticoagulation [Z79.01]  Deep vein thrombosis [453.40] (Resolved) [I82.409]  Stroke [434.91] (Resolved) [I63.9]  Deep vein thrombosis (DVT) (CMS-HCC) (Resolved) [I82.409]             Anticoagulation Episode Summary     [facility] check [facility]   [facility]    Preferred lab:       Send INR reminders to:       Comments:         Anticoagulation Care Providers     Provider Role Specialty Phone number    Jamari Platt M.D. Referring Family Medicine [phone]    [facility]   [phone]        Anticoagulation Patient Findings      HPI:  Drew Melton seen in clinic today, on anticoagulation therapy with warfairn for DVT  Changes to current medical/health status since last appt: denies  Denies signs/symptoms of bleeding and/or thrombosis since the last appt.    Denies any interval changes to diet  Denies any interval changes to medications since last appt.   Denies any complications or cost restrictions with current therapy.   BP declined.      A/P   INR  is supra-therapeutic.   Pt already took his dose today, so will have pt take a decreased dose of 1.25mgx1 tomorrow and then Friday to resume with his normal dosing.     Follow up appointment in 4 week(s).    Marlen López, PharmD

## 2018-04-06 LAB — INR BLD: 3.2 (ref 0.9–1.2)

## 2018-04-18 DIAGNOSIS — Z86.73 H/O: CVA (CEREBROVASCULAR ACCIDENT): ICD-10-CM

## 2018-04-18 DIAGNOSIS — R53.81 PHYSICAL DECONDITIONING: ICD-10-CM

## 2018-04-18 DIAGNOSIS — Z89.519 S/P BKA (BELOW KNEE AMPUTATION) UNILATERAL (HCC): ICD-10-CM

## 2018-04-18 DIAGNOSIS — I69.959 HEMIPLEGIA OF DOMINANT SIDE AS LATE EFFECT FOLLOWING CEREBROVASCULAR DISEASE (HCC): ICD-10-CM

## 2018-04-18 DIAGNOSIS — Z99.3 WHEELCHAIR BOUND: ICD-10-CM

## 2018-04-30 ENCOUNTER — PHYSICAL THERAPY (OUTPATIENT)
Dept: PHYSICAL THERAPY | Facility: REHABILITATION | Age: 63
End: 2018-04-30
Attending: INTERNAL MEDICINE
Payer: MEDICARE

## 2018-04-30 DIAGNOSIS — R53.81 DEBILITY: ICD-10-CM

## 2018-04-30 DIAGNOSIS — Z86.73 HISTORY OF STROKE: ICD-10-CM

## 2018-04-30 DIAGNOSIS — Z89.511 STATUS POST BELOW KNEE AMPUTATION OF RIGHT LOWER EXTREMITY (HCC): ICD-10-CM

## 2018-04-30 PROCEDURE — 97163 PT EVAL HIGH COMPLEX 45 MIN: CPT

## 2018-04-30 PROCEDURE — G8979 MOBILITY GOAL STATUS: HCPCS | Mod: CK

## 2018-04-30 PROCEDURE — G8978 MOBILITY CURRENT STATUS: HCPCS | Mod: CK

## 2018-04-30 PROCEDURE — 97110 THERAPEUTIC EXERCISES: CPT

## 2018-04-30 ASSESSMENT — BALANCE ASSESSMENTS
BALANCE - SITTING DYNAMIC: FAIR +
BALANCE - SITTING STATIC: GOOD

## 2018-04-30 ASSESSMENT — ENCOUNTER SYMPTOMS: PAIN SCALE: 0

## 2018-04-30 NOTE — OP THERAPY EVALUATION
Outpatient Physical Therapy  INITIAL EVALUATION    [facility] Physical Therapy [place] [address]  [address]  [place] [address]  Phone:  [phone]  Fax:  [phone]    Date of Evaluation: 04/30/2018    Patient: Drew Melton  YOB: 1955  MRN: [id number]     Referring Provider: Jamari Platt M.D.  [address]  [address]  [place] [address]   Referring Diagnosis S/P BKA (below knee amputation) unilateral (HCC) [Z89.519];Physical deconditioning [R53.81];H/O: CVA (cerebrovascular accident) [Z86.73];Wheelchair bound [Z99.3];Hemiplegia of dominant side as late effect following cerebrovascular disease (HCC) [I69.959]     Time Calculation  Start time: 1030  Stop time: 1130 Time Calculation (min): 60 minutes     Physical Therapy Occurrence Codes    Date of onset of impairment:  4/30/01   Date physical therapy care plan established or reviewed:  4/30/18   Date physical therapy treatment started:  4/30/18          Chief Complaint: No chief complaint on file.    Visit Diagnoses     ICD-10-CM   1. History of stroke Z86.73   2. Debility R53.81   3. Status post below knee amputation of right lower extremity (HCC) Z89.511         Subjective   History of Present Illness:     History of chief complaint:  Patient is a 62 year old male who presents in a 18 year old manual wheelchair. Patient is present with caregiver who is helping with history due to patient's global aphasia. Patient had CVA effecting the R side in 2011 subsequently followed by R BKA secondary to vascular clots.     Patient has been wheelchair bound since 2001. Patient reported to be independent with transfers, shower transfer (shower bench).     Patient lives in mobile home with rank to access home. Drew lives in [place]'s home. Eris reports patient has difficulty with assisting with any cooking tasks and reports difficulty secondary to communication.     Patient has never been evaluated for prosthesis. Patient  reports numbness on R side.     Patient hobbies include reading the bible.   Patient is in the process of trying to get new W/C, arm rests are broken down, seat is broken down.         Pain:     Current pain ratin    Location:  Patient does intermittent pain due to L toes     Quality: throbbing left foot     Social Support:     Lives in:  Trailer    Lives with: caregiver     Patient Goals:     Patient goals for therapy:  To improve mobility.       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;  Service: Gastroenterology   • AMPUTATION, BELOW THE KNEE Right    • OTHER      cataract IOLI       Precautions:       Objective   Observation and functional movement:  Patient Compa with W/C mobility on level surface 150 ft     Transfers   L to R: from W/C: reach pivot Compa   R to L: Patient reports inability to transfer that way, dependent to trial     Range of motion and strength:    R AROM knee extension -30 degrees with mobilization able to make some improvement but very limited.     R UE held in shoulder IR, elbow flexion, supination, and wrist flexion.     Patient had difficulty following commands for MMT   L Hip flexion: 4+/5 L knee extension: 4/5 L ankle DF 4/5   R: hip flexion: 4-/5     Tone noted on R UE into elbow flexion/extension   Difficult for patient to stay in lying down/ sitting position     Sensation and reflexes:      Sensation absent of R side with impaired proprioception.   Caregiver reports no pressure ulcers to his knowledge     Balance:     Sitting (static): Good    Sitting (dynamic): Fair +    Standing balance, unable to stand without L UE support     Gait:      Patient is dependent with gait, non-ambulatory     Basic self care and IADL's:     Reports independent with self care: transfers to the L , dressing, and toileting     Patient requires assistance for any kitchen related tasks.      Cognition and visual perception:     Global aphasia noted, with caregiver commenting to patient memory loss.             Therapeutic Exercises (CPT 64641):     1. Isometric bilateral quad sets with towel, 10 x 3     2. AROM with knee extension within range of motion as tolerated , 10 x 2       Time-based treatments/modalities:  Therapeutic exercise minutes (CPT 01705): 15 minutes       Assessment, Response and Plan:   Impairments: abnormal or restricted ROM, lacks appropriate home exercise program and safety issue    Assessment details:  Patient is a 62 year old male who presents with impaired mobility, decreased ROM, impaired sensation/proprioception, and weakness on R side secondary to history of CVA and R BKA. Patient currently is Compa with mobility at home but may benefit from PT to improve safety with transfer techniques and establish HEP.     Upon evaluation, patient may also benefit from updated manual W/C with cushion, removable arms rests, and recommend anti- tippers for safety.     I also recommend a ST evaluation to assist in strategies to improve communication for patient.   Goals:   Short Term Goals:   1) Patient can perform bent pivot transfer to R with Krunal  Short term goal time span:  1-2 weeks      Long Term Goals:    1) Patient and caregiver are independent with HEP   2) Patient can perform bent pivot transfer to the R with supervision   Long term goal time span:  2-4 weeks    Plan:   Therapy options:  Physical therapy  treatment to continue  Planned therapy interventions:  Neuromuscular Re-education (CPT 93964) and Therapeutic Exercise (CPT 62525)  Frequency:  1x week  Duration in weeks:  3  Duration in visits:  3  Discussed with:  Patient and caregiver  Plan details:  Anticipate 1-3 sessions depending on patient compliance and comprehension within PT sessions.       Functional Limitation G-Codes and Severity Modifiers      Current: Mobility: Current (): CK   Goal: Mobility: Goal (): CK     Referring provider co-signature:  I have reviewed this plan of care and my co-signature certifies the need for services.  Certification Dates:   From 4/30/2018       To 05/31/18    Physician Signature: ________________________________ Date: ______________

## 2018-05-02 ENCOUNTER — ANTICOAGULATION VISIT (OUTPATIENT)
Dept: VASCULAR LAB | Facility: MEDICAL CENTER | Age: 63
End: 2018-05-02
Attending: INTERNAL MEDICINE
Payer: MEDICARE

## 2018-05-02 DIAGNOSIS — Z79.01 CHRONIC ANTICOAGULATION: ICD-10-CM

## 2018-05-02 LAB
INR BLD: 2.9 (ref 0.9–1.2)
INR PPP: 2.9 (ref 2–3.5)

## 2018-05-02 PROCEDURE — 85610 PROTHROMBIN TIME: CPT

## 2018-05-02 PROCEDURE — 99211 OFF/OP EST MAY X REQ PHY/QHP: CPT | Performed by: PHARMACIST

## 2018-05-02 NOTE — PROGRESS NOTES
Anticoagulation Summary  As of 5/2/2018    INR goal:   2.0-3.0   TTR:   64.3 % (2.9 y)   Today's INR:   2.9   Warfarin maintenance plan:   1.25 mg (2.5 mg x 0.5) on Wed; 2.5 mg (2.5 mg x 1) all other days   Weekly warfarin total:   16.25 mg   Plan last modified:   Lyndsey SevillaD (9/30/2015)   Next INR check:   6/27/2018   Target end date:   Indefinite    Indications    Chronic anticoagulation [Z79.01]  Deep vein thrombosis [453.40] (Resolved) [I82.409]  Stroke [434.91] (Resolved) [I63.9]  Deep vein thrombosis (DVT) (HCC) (Resolved) [I82.409]             Anticoagulation Episode Summary     [facility] check location[facility]   [facility]    Preferred lab:       Send INR reminders to:       Comments:         Anticoagulation Care Providers     Provider Role Specialty Phone number    Jamari Platt M.D. Referring Family Medicine [phone]    [facility]   [phone]        Anticoagulation Patient Findings      HPI:  Drew Melton seen in clinic today, on anticoagulation therapy with warfarin for Hx DVT  Changes to current medical/health status since last appt: denies  Denies signs/symptoms of bleeding and/or thrombosis since the last appt.    Denies any interval changes to diet  Denies any interval changes to medications since last appt.   Denies any complications or cost restrictions with current therapy.   BP declined      A/P   INR  is-therapeutic.   Will continue with the same warfarin dosing.     Follow up appointment in 8 week(s) per pt perference.    Marlen López, PharmD

## 2018-05-08 ENCOUNTER — PHYSICAL THERAPY (OUTPATIENT)
Dept: PHYSICAL THERAPY | Facility: REHABILITATION | Age: 63
End: 2018-05-08
Attending: INTERNAL MEDICINE
Payer: MEDICARE

## 2018-05-08 DIAGNOSIS — Z86.73 HISTORY OF STROKE: ICD-10-CM

## 2018-05-08 DIAGNOSIS — R53.81 DEBILITY: ICD-10-CM

## 2018-05-08 DIAGNOSIS — Z89.511 STATUS POST BELOW KNEE AMPUTATION OF RIGHT LOWER EXTREMITY (HCC): ICD-10-CM

## 2018-05-08 PROCEDURE — 97110 THERAPEUTIC EXERCISES: CPT

## 2018-05-08 PROCEDURE — 97112 NEUROMUSCULAR REEDUCATION: CPT

## 2018-05-08 NOTE — OP THERAPY DAILY TREATMENT
Outpatient Physical Therapy  DAILY TREATMENT     [facility] Physical [address].  [address]  [place] [address]  Phone:  [phone]  Fax:  [phone]    Date: 05/08/2018    Patient: Drew Melton  YOB: 1955  MRN: [id number]     Time Calculation  Start time: 1020  Stop time: 1120 Time Calculation (min): 60 minutes     Chief Complaint: Impaired mobility   Visit #: 2    SUBJECTIVE:  Patient present with caregiver, Eris. Per Eris, patient has been completing exercises at home.     OBJECTIVE:  Current objective measures:   R knee flexion contracture -20 degrees      bent pivot incremental transfer to the R: Krunal       Therapeutic Exercises (CPT 34112):     1. PROM , L knee extension with prolonged stretch for available ROM, unable to change past -20 degrees 3 minutes     2. Rhythmic stabilization , R LE flexion/extension in supine position x 10 reps with max cueing for increased use of extensors     3. Bridging , 2 x 10 reps with R LE support on bolster , patient able to actively give minimal lift     4. L knee flexion/extension with stability ball , 2x 20 seconds     5. Attempted prone position but unable secondary to fear, impaired communication with decreased use of R side     6. Hip extension stretch , demonstrated with caregiver, assistance for stretch and handout     Therapeutic Treatments and Modalities:     1. Neuromuscular Re-education (CPT 80435), with use of SB, education on incremental bent pivot transfer to R side, patient initially reluctant to trial, but was able to complete 4x transfers to R with Krunal. Patient does require cueing for forward WS and foot positioning , Recommended to caregiver and patient to consider contacting [facility] to borrow a SB for safety with trial of transfers to the R in the home setting , sitting on edge of mat, tactile cueing with bedside table to R side for midline orientation and balance with WS to the R side , patient  requiring Krunal and tends to hold body towards left     Therapeutic Treatment and Modalities Summary: Caregiver mentioned that patient received new W/C and bigger than previous one. Recommend patient to being W/C in to next session to assess.     Time-based treatments/modalities:  Therapeutic exercise minutes (CPT 38014): 30 minutes  Neuromusc re-ed, balance, coor, post minutes (CPT 64121): 30 minutes       Pain rating before treatment: unable to get an accurate number from patient secondary to global aphasia   Pain rating after treatment: unable to get an accurate number from patient secondary to global aphasia      ASSESSMENT:   Response to treatment: Patient tolerated session. The PT session is difficult secondary to patient global aphasia, decreased sensation on the R side, and decreased ROM. Continue to recommend ST evaluation for improvement of communication. Patient to be seen for additional session next week.     PLAN/RECOMMENDATIONS:   Plan for treatment: therapy treatment to continue next visit.  Planned interventions for next visit: continue with current treatment.

## 2018-05-14 ENCOUNTER — HOSPITAL ENCOUNTER (OUTPATIENT)
Dept: LAB | Facility: MEDICAL CENTER | Age: 63
End: 2018-05-14
Attending: INTERNAL MEDICINE
Payer: MEDICARE

## 2018-05-14 DIAGNOSIS — E78.5 DYSLIPIDEMIA (HIGH LDL; LOW HDL): Chronic | ICD-10-CM

## 2018-05-14 DIAGNOSIS — R73.01 IFG (IMPAIRED FASTING GLUCOSE): ICD-10-CM

## 2018-05-14 LAB
ALBUMIN SERPL BCP-MCNC: 4.3 G/DL (ref 3.2–4.9)
ALBUMIN/GLOB SERPL: 1.5 G/DL
ALP SERPL-CCNC: 47 U/L (ref 30–99)
ALT SERPL-CCNC: 27 U/L (ref 2–50)
ANION GAP SERPL CALC-SCNC: 13 MMOL/L (ref 0–11.9)
AST SERPL-CCNC: 28 U/L (ref 12–45)
BILIRUB SERPL-MCNC: 0.9 MG/DL (ref 0.1–1.5)
BUN SERPL-MCNC: 24 MG/DL (ref 8–22)
CALCIUM SERPL-MCNC: 9.5 MG/DL (ref 8.5–10.5)
CHLORIDE SERPL-SCNC: 109 MMOL/L (ref 96–112)
CHOLEST SERPL-MCNC: 173 MG/DL (ref 100–199)
CO2 SERPL-SCNC: 23 MMOL/L (ref 20–33)
CREAT SERPL-MCNC: 0.92 MG/DL (ref 0.5–1.4)
EST. AVERAGE GLUCOSE BLD GHB EST-MCNC: 100 MG/DL
GLOBULIN SER CALC-MCNC: 2.9 G/DL (ref 1.9–3.5)
GLUCOSE SERPL-MCNC: 78 MG/DL (ref 65–99)
HBA1C MFR BLD: 5.1 % (ref 0–5.6)
HDLC SERPL-MCNC: 31 MG/DL
LDLC SERPL CALC-MCNC: 101 MG/DL
POTASSIUM SERPL-SCNC: 4.1 MMOL/L (ref 3.6–5.5)
PROT SERPL-MCNC: 7.2 G/DL (ref 6–8.2)
SODIUM SERPL-SCNC: 145 MMOL/L (ref 135–145)
TRIGL SERPL-MCNC: 204 MG/DL (ref 0–149)

## 2018-05-14 PROCEDURE — 83036 HEMOGLOBIN GLYCOSYLATED A1C: CPT | Mod: GA

## 2018-05-14 PROCEDURE — 36415 COLL VENOUS BLD VENIPUNCTURE: CPT

## 2018-05-14 PROCEDURE — 80061 LIPID PANEL: CPT

## 2018-05-14 PROCEDURE — 80053 COMPREHEN METABOLIC PANEL: CPT

## 2018-05-16 ENCOUNTER — PHYSICAL THERAPY (OUTPATIENT)
Dept: PHYSICAL THERAPY | Facility: REHABILITATION | Age: 63
End: 2018-05-16
Attending: INTERNAL MEDICINE
Payer: MEDICARE

## 2018-05-16 DIAGNOSIS — Z89.511 STATUS POST BELOW KNEE AMPUTATION OF RIGHT LOWER EXTREMITY (HCC): ICD-10-CM

## 2018-05-16 DIAGNOSIS — R53.81 DEBILITY: ICD-10-CM

## 2018-05-16 DIAGNOSIS — Z86.73 HISTORY OF STROKE: ICD-10-CM

## 2018-05-16 PROCEDURE — G8979 MOBILITY GOAL STATUS: HCPCS | Mod: CK

## 2018-05-16 PROCEDURE — G8978 MOBILITY CURRENT STATUS: HCPCS | Mod: CK

## 2018-05-16 PROCEDURE — 97110 THERAPEUTIC EXERCISES: CPT

## 2018-05-16 PROCEDURE — G8980 MOBILITY D/C STATUS: HCPCS | Mod: CK

## 2018-05-16 PROCEDURE — 97112 NEUROMUSCULAR REEDUCATION: CPT

## 2018-05-16 NOTE — OP THERAPY DISCHARGE SUMMARY
Outpatient Physical Therapy  DISCHARGE SUMMARY NOTE      [facility] Physical Therapy [address]  [address]  [place] [address]  Phone:  [phone]  Fax:  [phone]    Date of Visit: 05/16/2018    Patient: Drew Melton  YOB: 1955  MRN: [id number]     Referring Provider: Jamari Platt M.D.  [address]  [address]  [place] [address]   Referring Diagnosis Acquired absence of unspecified leg below knee [Z89.519];Other malaise [R53.81];Dependence on wheelchair [Z99.3]     Physical Therapy Occurrence Codes    Date of onset of impairment:  4/30/01   Date physical therapy care plan established or reviewed:  4/30/18   Date physical therapy treatment started:  4/30/18          Functional Limitation G-Codes and Severity Modifiers      Goal: Mobility: Goal (): CK   Discharge: Mobility: Discharge (): CK       Your patient is being discharged from Physical Therapy with the following comments:   · Goals met    Comments:  Patient was seen for evaluation and 2 visits. Due to patient aphasia, difficult to follow cueing for exercises during sessions. Completed education with caregiver on stretching and improving strength.     Limitations Remaining:  Patient continues to demonstrate impaired ROM/contracture of R knee flexion. Patient also demonstrates limiting bilateral hip extension.     Recommendations:  Patient to d/c to independent HEP.     Jennifer Sotelo, PT, DPT    Date: 5/16/2018

## 2018-05-16 NOTE — OP THERAPY DAILY TREATMENT
Outpatient Physical Therapy  DAILY TREATMENT     [facility] Physical [address].  [address]  [place] [address]  Phone:  [phone]  Fax:  [phone]    Date: 05/16/2018    Patient: Drew Melton  YOB: 1955  MRN: [id number]     Time Calculation  Start time: 0930  Stop time: 1025 Time Calculation (min): 55 minutes     Chief Complaint: Impaired mobility   Visit #: 3    SUBJECTIVE:  Patient is present with caregiver, Eris. Eris reports patient has not been completely consistent with exercises.     At previous session requested for patient to bring in new wheelchair to assess fit, but patient came to session in the old W/C.     OBJECTIVE:  Current objective measures:   L knee extension -40 degrees  Close supervision bent pivot transfer to R            Therapeutic Exercises (CPT 21046):     1. PROM L knee extension , ROM and educating Eris on positioning to maximize available ROM     2. R hip flexion in supine, 1 x 15 reps, cueing for control of movement     3. Bridging , 2 x 10 with bolster to increase R LE WB, 1 x 10 with L LE WB predominately     4. R hip extension stretch in sidelying , 2 x 1 minute, education for eris on positioning     5. Hip circles in sidelying, AAROM , 1 x 10 clockwise/ counter clockwise position    Therapeutic Treatments and Modalities:     1. Neuromuscular Re-education (CPT 87462), Emphasis on R lateral incremental bent pivot transfers intially with use of SB and then lateral scooting along mat. Patient requires cueing for anterior WS, but does demonstrate improvement in ability to move to R side with less fear. , Sit to stand at raised surface with patient able to decrease L arm support for 5 second increments. The patient does tend to lean against raised surface for support. W/C behind patient for safety.    Time-based treatments/modalities:  Therapeutic exercise minutes (CPT 96449): 30 minutes  Neuromusc re-ed, balance, coor, post  minutes (CPT 36102): 25 minutes       Pain rating before treatment: 0  Pain rating after treatment: 0    ASSESSMENT:   Response to treatment: Patient attempted to follow PT instructions during session as possible, but at times frustrated due to aphasia. Patient does demonstrate decreased R knee ROM and bilateral hip extension secondary to being in seated position throughout the day. At this time, d/c from PT with patient and caregiver to continue a HEP.     PLAN/RECOMMENDATIONS:   Plan for treatment: discharge patient due to accomplished goals.

## 2018-05-17 ENCOUNTER — OCCUPATIONAL THERAPY (OUTPATIENT)
Dept: OCCUPATIONAL THERAPY | Facility: REHABILITATION | Age: 63
End: 2018-05-17
Attending: INTERNAL MEDICINE
Payer: MEDICARE

## 2018-05-17 DIAGNOSIS — I69.959 HEMIPLEGIA OF DOMINANT SIDE, LATE EFFECT OF CEREBROVASCULAR DISEASE (HCC): ICD-10-CM

## 2018-05-17 DIAGNOSIS — Z86.73 PERSONAL HISTORY OF TRANSIENT CEREBRAL ISCHEMIA: ICD-10-CM

## 2018-05-17 DIAGNOSIS — Z89.511 STATUS POST BELOW KNEE AMPUTATION OF RIGHT LOWER EXTREMITY (HCC): ICD-10-CM

## 2018-05-17 DIAGNOSIS — R53.81 DEBILITY: ICD-10-CM

## 2018-05-17 PROCEDURE — G8991 OTHER PT/OT GOAL STATUS: HCPCS | Mod: CI

## 2018-05-17 PROCEDURE — G8990 OTHER PT/OT CURRENT STATUS: HCPCS | Mod: CJ

## 2018-05-17 PROCEDURE — 97110 THERAPEUTIC EXERCISES: CPT

## 2018-05-17 PROCEDURE — 97166 OT EVAL MOD COMPLEX 45 MIN: CPT

## 2018-05-17 SDOH — ECONOMIC STABILITY: GENERAL: QUALITY OF LIFE: FAIR

## 2018-05-17 ASSESSMENT — ENCOUNTER SYMPTOMS: PAIN SCALE: 0
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
## 2018-05-22 ENCOUNTER — OCCUPATIONAL THERAPY (OUTPATIENT)
Dept: OCCUPATIONAL THERAPY | Facility: REHABILITATION | Age: 63
End: 2018-05-22
Attending: INTERNAL MEDICINE
Payer: MEDICARE

## 2018-05-22 ENCOUNTER — OFFICE VISIT (OUTPATIENT)
Dept: MEDICAL GROUP | Facility: MEDICAL CENTER | Age: 63
End: 2018-05-22
Payer: MEDICARE

## 2018-05-22 VITALS
SYSTOLIC BLOOD PRESSURE: 120 MMHG | OXYGEN SATURATION: 96 % | DIASTOLIC BLOOD PRESSURE: 78 MMHG | TEMPERATURE: 97.4 F | HEIGHT: 72 IN | HEART RATE: 82 BPM | BODY MASS INDEX: 21.98 KG/M2 | WEIGHT: 162.26 LBS

## 2018-05-22 DIAGNOSIS — Z89.511 STATUS POST BELOW KNEE AMPUTATION OF RIGHT LOWER EXTREMITY (HCC): ICD-10-CM

## 2018-05-22 DIAGNOSIS — I69.959 HEMIPLEGIA OF DOMINANT SIDE AS LATE EFFECT FOLLOWING CEREBROVASCULAR DISEASE (HCC): ICD-10-CM

## 2018-05-22 DIAGNOSIS — Z00.00 HEALTH CARE MAINTENANCE: ICD-10-CM

## 2018-05-22 DIAGNOSIS — Z86.73 H/O: CVA (CEREBROVASCULAR ACCIDENT): ICD-10-CM

## 2018-05-22 DIAGNOSIS — I69.919 COGNITIVE DEFICITS AS LATE EFFECT OF CEREBROVASCULAR DISEASE: ICD-10-CM

## 2018-05-22 DIAGNOSIS — Z79.01 CHRONIC ANTICOAGULATION: ICD-10-CM

## 2018-05-22 DIAGNOSIS — E78.5 DYSLIPIDEMIA (HIGH LDL; LOW HDL): Chronic | ICD-10-CM

## 2018-05-22 DIAGNOSIS — I69.959 HEMIPLEGIA OF DOMINANT SIDE, LATE EFFECT OF CEREBROVASCULAR DISEASE (HCC): ICD-10-CM

## 2018-05-22 DIAGNOSIS — H61.23 BILATERAL IMPACTED CERUMEN: ICD-10-CM

## 2018-05-22 DIAGNOSIS — Z12.5 SCREENING FOR MALIGNANT NEOPLASM OF PROSTATE: ICD-10-CM

## 2018-05-22 DIAGNOSIS — Z99.3 WHEELCHAIR BOUND: ICD-10-CM

## 2018-05-22 DIAGNOSIS — R47.9 SPEECH PROBLEM: ICD-10-CM

## 2018-05-22 DIAGNOSIS — Z89.519 S/P BKA (BELOW KNEE AMPUTATION) UNILATERAL (HCC): ICD-10-CM

## 2018-05-22 PROBLEM — L30.9 DERMATITIS: Status: RESOLVED | Noted: 2017-09-21 | Resolved: 2018-05-22

## 2018-05-22 PROCEDURE — 99214 OFFICE O/P EST MOD 30 MIN: CPT | Performed by: INTERNAL MEDICINE

## 2018-05-22 PROCEDURE — 97760 ORTHOTIC MGMT&TRAING 1ST ENC: CPT

## 2018-05-22 PROCEDURE — 97112 NEUROMUSCULAR REEDUCATION: CPT | Mod: XU

## 2018-05-22 ASSESSMENT — PATIENT HEALTH QUESTIONNAIRE - PHQ9: CLINICAL INTERPRETATION OF PHQ2 SCORE: 0
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
## 2018-05-24 ENCOUNTER — OCCUPATIONAL THERAPY (OUTPATIENT)
Dept: OCCUPATIONAL THERAPY | Facility: REHABILITATION | Age: 63
End: 2018-05-24
Attending: INTERNAL MEDICINE
Payer: MEDICARE

## 2018-05-24 DIAGNOSIS — I69.959 HEMIPLEGIA OF DOMINANT SIDE, LATE EFFECT OF CEREBROVASCULAR DISEASE (HCC): ICD-10-CM

## 2018-05-24 DIAGNOSIS — R53.81 DEBILITY: ICD-10-CM

## 2018-05-24 PROCEDURE — 97112 NEUROMUSCULAR REEDUCATION: CPT

## 2018-05-24 PROCEDURE — 97535 SELF CARE MNGMENT TRAINING: CPT

## 2018-05-24 NOTE — OP THERAPY DAILY TREATMENT
"  Outpatient Occupational Therapy  DAILY TREATMENT     [facility] Occupational Therapy [address].  [address]  [place] [address]  Phone:  [phone]  Fax:  [phone]    Date: 05/24/2018    Patient: Drew Melton  YOB: 1955  MRN: [id number]     Time Calculation  Start time: 0400  Stop time: 0500 Time Calculation (min): 60 minutes     Chief Complaint: Extremity Weakness (right) and Other (IADL duties)    Visit #: 3    SUBJECTIVE: the blue foam hand roll is comfortable (in response to question)    OBJECTIVE:  Current objective measures:   Scapular elevation strength 3/5, retraction 2/5        Therapeutic Treatments and Modalities:    1. Self Care ADL Training (CPT 22732)    2. Neuromuscular Re-education (CPT 40432)    Therapeutic Treatments and Modalities Summary: IADLs: discussed current limitations and presented with 2 AE solutions via pictures, video, and given printed hand-outs for purchasing items.  Pt/friend in agreement with benefits of both items to improve independence with meal prep as per initial goal.  AE recommendations included \"Delux 1-handed pairing board\" for stabilization of objects using a vise and spikes and \"Magic opener\" to open cans/bottles.  RUE passive sustained stretch shoulder to digits within tolerable range.  Scapular strengthening exercises for elevation, retraction combined with joint approximation.  Reinforced the importance of stretching and postural muscle activation with good understanding.    Time-based treatments/modalities:  Self-care/ADL training minutes (CPT 32437): 30 minutes  Neuromusc re-ed minutes (CPT 30366): 30 minutes      ASSESSMENT:   Response to treatment:  Pt responded well to recommended AE to facilitate meal prep and opening of bottles with the use of 1 hand.  Pt/friend intend to purchase items.  Pt also responded well to RUE stretching and scapular muscle strengthening.  Pt to perform at home with good " understanding.    PLAN/RECOMMENDATIONS:   Plan for treatment: therapy treatment to continue next visit.  Planned interventions for next visit: continue with current treatment, functional training/self care (CPT 72047) and neuromuscular re-education (CPT 90306)

## 2018-05-29 ENCOUNTER — OCCUPATIONAL THERAPY (OUTPATIENT)
Dept: OCCUPATIONAL THERAPY | Facility: REHABILITATION | Age: 63
End: 2018-05-29
Attending: INTERNAL MEDICINE
Payer: MEDICARE

## 2018-05-29 DIAGNOSIS — I69.959 HEMIPLEGIA OF DOMINANT SIDE, LATE EFFECT OF CEREBROVASCULAR DISEASE (HCC): ICD-10-CM

## 2018-05-29 DIAGNOSIS — Z89.511 STATUS POST BELOW KNEE AMPUTATION OF RIGHT LOWER EXTREMITY (HCC): ICD-10-CM

## 2018-05-29 PROCEDURE — 97112 NEUROMUSCULAR REEDUCATION: CPT

## 2018-05-29 NOTE — OP THERAPY DAILY TREATMENT
"  Outpatient Occupational Therapy  DAILY TREATMENT     [facility] Occupational Therapy [address]  [address]  [place] [address]  Phone:  [phone]  Fax:  [phone]    Date: 05/29/2018    Patient: Drew Melton  YOB: 1955  MRN: [id number]     Time Calculation  Start time: 0930  Stop time: 1030 Time Calculation (min): 60 minutes     Chief Complaint: Extremity Weakness (right) and Self Care Duties (IADLs)    Visit #: 4    SUBJECTIVE: \"Fine\", (per friend) \"I ordered the slicing board and the can opener, he was able to use it pretty well, he was able to make a sandwich and open a can of mayonnaise\"    OBJECTIVE:  Current objective measures:   Scapular elevation strength 3/5, retraction 2/5, depression/protraction 2-/5.        Therapeutic Treatments and Modalities:    1. Neuromuscular Re-education (CPT 51716)    Therapeutic Treatments and Modalities Summary: RUE passive sustained stretch shoulder to digits within tolerable range, shoulder ff/abd to 120 degrees.  Scapular strengthening exercises for elevation, retraction, depression via WB through forearm/elbow combined with joint approximation and tactile stimulation.  LH stabilizing RH mid-position on chair in front of pt for combined scapular elevation and retraction.  Table top scapular protraction with roll in hand with limited success.  Reinforced the importance of stretching and postural muscle activation with good understanding.  Discussed use of meal prep AE with good understanding of use and need for further practice.   Pt able to don/doff foam hand roll independently.    Time-based treatments/modalities:  Neuromusc re-ed minutes (CPT 55193): 60 minutes      ASSESSMENT:   Response to treatment:  Pt making progress with RUE proximal muscle strength and flexibility in response to passive sustained stretching and facilitory techniques for activation of scapular muscles and prevention of further contractures.  Pt's friend " reporting a positive outcome regarding use of recommended AE for meal prep.  HEP updated with good understanding.    PLAN/RECOMMENDATIONS:   Plan for treatment: therapy treatment to continue next visit.  Planned interventions for next visit: continue with current treatment, functional training/self care (CPT 92684) and neuromuscular re-education (CPT 72612)

## 2018-05-31 ENCOUNTER — OCCUPATIONAL THERAPY (OUTPATIENT)
Dept: OCCUPATIONAL THERAPY | Facility: REHABILITATION | Age: 63
End: 2018-05-31
Attending: INTERNAL MEDICINE
Payer: MEDICARE

## 2018-05-31 DIAGNOSIS — I69.959 HEMIPLEGIA OF DOMINANT SIDE, LATE EFFECT OF CEREBROVASCULAR DISEASE (HCC): ICD-10-CM

## 2018-05-31 DIAGNOSIS — Z89.511 STATUS POST BELOW KNEE AMPUTATION OF RIGHT LOWER EXTREMITY (HCC): ICD-10-CM

## 2018-05-31 PROCEDURE — 97110 THERAPEUTIC EXERCISES: CPT

## 2018-05-31 PROCEDURE — G8992 OTHER PT/OT  D/C STATUS: HCPCS | Mod: CI

## 2018-05-31 PROCEDURE — G8991 OTHER PT/OT GOAL STATUS: HCPCS | Mod: CI

## 2018-05-31 NOTE — OP THERAPY DAILY TREATMENT
"  Outpatient Occupational Therapy  DAILY TREATMENT     [facility] Occupational Therapy [address].  [address]  [place] [address]  Phone:  [phone]  Fax:  [phone]    Date: 05/31/2018    Patient: Drew Melton  YOB: 1955  MRN: [id number]     Time Calculation  Start time: 1030  Stop time: 1130 Time Calculation (min): 60 minutes     Chief Complaint: Extremity Weakness (right) and Self Care Duties (IADLs)    Visit #: 5    SUBJECTIVE: \"I opened a can with the board\"    OBJECTIVE:  Current objective measures:   Scapular elevation strength 3/5, retraction 2/5, depression/protraction 2-/5.  OT Functional Assessment Tool Used: IADL assessment  OT Functional Assessment Score: 20% assistance with IADLs     Therapeutic Exercises (CPT 84174):     1. RUE    Therapeutic Exercise Summary:  Passive sustained stretch RUE shoulder to digits, able to tolerate 120 degrees of sh ff/abd limited by pain.  Issued and instructed on written HEP for self ROM and active scapular strengthening.  Performed self-stretching of right wrist, digits, elbow and shoulder.  Active scapular elevation, retraction, and combined movements.  Friend present to assist with carry-over.  Independent don/doff hand foam roll orthosis.  Discussed current use of AE for meal prep (cutting board with vise and automatic can opener), pt able to perform with improved independence but will require further practice.      Time-based treatments/modalities:  Therapeutic exercise minutes (CPT 25330): 60 minutes      ASSESSMENT:   Response to treatment:  Pt has participated in short-term skilled OT program and has made good progress to meet LTGs.  Pt purchased recommended AE for 1-handed techniques to perform meal prep including pairing board with vise and electric can opener.  Pt's friend plans to purchase \"Magic opener\" to allow pt to open various bottles/jars with less difficulty.  Pt is independent with positioning using hand " roll.  Pt/friend are independent with written HEP for stretching to prevent further contractures and strengthening of scapular/postural muscles.  At this point, pt is safe to d/c to HEP.    PLAN/RECOMMENDATIONS:   Plan for treatment: discharge patient due to accomplished goals.  Planned interventions for next visit: D/C to HEP.

## 2018-05-31 NOTE — OP THERAPY DISCHARGE SUMMARY
"  Outpatient Occupational Therapy  DISCHARGE SUMMARY NOTE    [facility] Occupational Therapy [address]  [address]  [place] [address]  Phone:  [phone]  Fax:  [phone]    Date of Visit: 05/31/2018    Patient: Drew Melton  YOB: 1955  MRN: [id number]     Referring Provider: Jamari Platt M.D.  [address]  [address]  [place] [address]   Referring Diagnosis: No admission diagnoses are documented for this encounter.     Occupational Therapy Occurrence Codes    Date of onset of impairment:  5/15/01   Date of occupational therapy care plan established or reviewed:  5/17/18   Date of occupational therapy treatment started:  5/17/18          Functional Limitation G-Codes and Severity Modifiers  OT Functional Assessment Tool Used: IADL assessment  OT Functional Assessment Score: 20% assistance with IADLs   Goal: Other PT/OT Primary: Goal (): CI   Discharge: Other PT/OT Primary: Discharge (): CI       Your patient is being discharged from Occupational Therapy with the following comments:   · Goals met    Comments:  Pt has participated in short-term skilled OT program and has made good progress to meet LTGs.  Pt purchased recommended AE for 1-handed techniques to perform meal prep including pairing board with vise and electric can opener.  Pt's friend plans to purchase \"Magic opener\" to allow pt to open various bottles/jars with less difficulty.  Pt is independent with positioning using hand roll.  Pt/friend are independent with written HEP for stretching to prevent further contractures and strengthening of scapular/postural muscles.  At this point, pt is safe to d/c to HEP.    Limitations Remaining:  See daily note from 5/31/18 for details.    Recommendations:  D/C OT    Rod Mcgee MS,OTR/L    Date: 5/31/2018  "

## 2018-06-05 ENCOUNTER — SPEECH THERAPY (OUTPATIENT)
Dept: SPEECH THERAPY | Facility: REHABILITATION | Age: 63
End: 2018-06-05
Attending: INTERNAL MEDICINE
Payer: MEDICARE

## 2018-06-05 DIAGNOSIS — R47.01 APHASIA: ICD-10-CM

## 2018-06-05 PROCEDURE — 92523 SPEECH SOUND LANG COMPREHEN: CPT

## 2018-06-05 PROCEDURE — G9162 LANG EXPRESS CURRENT STATUS: HCPCS | Mod: CK

## 2018-06-05 PROCEDURE — G9163 LANG EXPRESS GOAL STATUS: HCPCS | Mod: CK

## 2018-06-05 ASSESSMENT — SOCIAL DETERMINANTS OF HEALTH (SDOH): SOCIAL_SUPPORT_SYSTEM: CAREGIVER

## 2018-06-05 NOTE — OP THERAPY EVALUATION
"  Outpatient Speech Therapy  INITIAL EVALUATION    [facility] Speech Therapy [address].  [address]  [place] [address]  Phone:  [phone]  Fax:  [phone]    Date of Evaluation: 06/05/2018    Patient: Drew Melton  YOB: 1955  MRN: [id number]     Referring Provider: Jamari Platt M.D.  [address]  [address]  [place] [address]   Referring Diagnosis Speech problem [R47.9]     Time Calculation  Start time: 0801  Stop time: 0910 Time Calculation (min): 69 minutes     Speech Therapy Occurrence Codes    Date of Onset of Impairment:  5/22/18   Date speech therapy care plan established or reviewed:  6/5/18   Date speech therapy treatment started:  6/5/18          Chief Complaint: Aphasia    Visit Diagnoses     ICD-10-CM   1. Aphasia R47.01     Subjective:   Reason for Therapy:     Reason For Evaluation:  Aphasia    Onset Description:  Pt has a significant left CVA in 2001 with residual aphasia and right side deficits.  He is accompanied by his friend who is POA.  Per friend pt had therapy while in SNF after his CVA (right BKA also completed at that time).  Was unable to continue with his therapy due to \"legal matters\" and then was not motivated to pursue therapy at that time.  Pt is now motivated to learn about ways to better communicate to his friend and in the community.    Social Support:     Accompanied By:  Caregiver  Progress Factors:     Progression:  Getting Better  Therapy History:     Previous Treatments and Dates:  Brief immediately after CVA.     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   • Stroke (HCC) 09/01/2001    Right sided paralysis   • Unspecified late effects of cerebrovascular disease  " "  • 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     Past Surgical History:   Procedure Laterality Date   • COLONOSCOPY  1/24/2018    Procedure: COLONOSCOPY;  Surgeon: Lenin Danielle M.D.;  Location: SURGERY [facility];  Service: Gastroenterology   • AMPUTATION, BELOW THE KNEE Right 2001   • OTHER      cataract IOLI     Objective:   Treatments/Interventions Performed:  Patient/Caregiver education and Compensatory strategy training  Other Treatment Interventions:  Speech/language eval- 69 mins    Speech Therapy Assessment:     Expressive Language Assessment:     Communicates using gestures: Moderate.    Ability to exhibit appropriate naming: Moderate (70% on first 30 of BNT, increased to 80% with phonemic cues.).    Explains function of object Severe (does not attempt \"ROSARIO\").    Patient's ability to use automatic language appropriately is Moderate (ok at 1-2 syllable word level).    Patient's ability to verbalize wants and needs is Moderate (to sev).    Patient's ability to sustain dialogues within a given topic is Moderate.    Patient's ability to formulate complex/abstract language is Severe (mod-sev).    Agrammatism is Present.    Paraphasic is Present.    Perseveration is Present.    Expressive language comments: Halting speech with anomias and tangents.  Noun/verb phrases.  Quick to abandon task, does not attempt to use strategies. Able to count to 20 on own.  Unable to say alphabet w/o mod assist.  60% for CINTHYA with initiation.  Able to name 5 animals on own.  75% for high frequency sentence completion tasks.     Written Language Assessment:     Patient ability to copy words Supervision Required.      Patient ability to write full name accurately WFL.    Ability to write single words (spontaneously) Profound.     Ability to generate phrases Profound.    Ability to generate sentences Profound.    Language comments: Using non-dominant hand.    Receptive Language Assessment:     Patient ability to identify body " parts: Supervision Required    Personal information yes/no questions: WFL    Simple yes/no questions: Supervision Required    Complex yes/no questions: Severe    Patient follows 1 step simple commands: WFL    Patient follows 2 step simple commands:Severe    Patient understands simple conversation: Minimal    Patient understands complex conversation: Moderate    Reading Comprehension Assessment:     Patient ability to comprehend short phrases: Minimal    Reading comprehension comments: Pt able to read at simple sentence level.  70-80% accuracy with reading target words aloud.  50% accuracy for longer more detailed sentences.  Pt's goal is to order items in a restaurant, need to work on functional reading skills.     Speech Therapy Plan :   Prognosis & Recommendations  Impression Summary:  Pt presents with moderate expressive and receptive aphasia.  Pt given WAB-B assessment scoring 49/100 for aphasia score and 41/100 for language score.  Reading is a strength and pt is highly motivated to improve his reading as that is a main past-time for him.  Pt and friend would benefit from education regarding use of compensatory strategies and structured home program.   Prognosis:  Good  Goals  Short Term Goals:  1.  Pt will demo use of SFA when presented pic stim given min cues 70% accuracy.  2.  Pt will state canonical simple sentence when shown simple action picture or given a verb 60% min cues.  3.  Pt will demo functional reading skills (menus, receipts, tables) 70% with min cues.   4.  Pt will follow 2 step auditory instructions given one repetition 60% accuracy.  Short Term Goal Duration (Weeks):  4-6 weeks  Long Term Goals:  1.  Pt will be independent with ordering items off of a menu using speech, pointing to menu items and/or gestures.  2.  Pt will comprehend communication r/t basic medical and social needs with use of compensatory strategies to maintain safety and participate socially in functional living environment.    Long Term Goal Duration (Weeks):  1-2 months  Patient Stated Goal:  1.  To order independently at a restaurant  2.  To say the Lord's prayer  Therapy Recommendations  Recommendation:  Individual Speech Therapy,  Planned Therapy Interventions:  Home Program, Patient/Caregiver Education, Compensatory Strategy Training and Speech/Language training,   Frequency:  1x week  Duration (in weeks):  8      Functional Limitation G-Codes and Severity Modifiers     Current:   CK   Goal:   CK       Referring provider co-signature:  I have reviewed this plan of care and my co-signature certifies the need for services.  Certification Dates:   From 6/5/18   To 7/31/18    Physician Signature: ________________________________ Date: ______________

## 2018-06-12 ENCOUNTER — SPEECH THERAPY (OUTPATIENT)
Dept: SPEECH THERAPY | Facility: REHABILITATION | Age: 63
End: 2018-06-12
Attending: INTERNAL MEDICINE
Payer: MEDICARE

## 2018-06-12 DIAGNOSIS — R47.01 APHASIA: ICD-10-CM

## 2018-06-12 PROCEDURE — 92507 TX SP LANG VOICE COMM INDIV: CPT

## 2018-06-12 NOTE — OP THERAPY DAILY TREATMENT
"  Outpatient Speech Therapy  DAILY TREATMENT     [facility] Speech [address].  [address]  [place] [address]  Phone:  [phone]  Fax:  [phone]    Date: 06/12/2018    Patient: Drew Melton  YOB: 1955  MRN: [id number]     Time Calculation  Start time: 1401  Stop time: 1502 Time Calculation (min): 61 minutes     Chief Complaint: Aphasia    Visit #: 2    Subjective Evaluation Pt on time, accompanied by his caretaker/friend Eris.  In good spirits. Cooked breakfast independently this morning.     Objective:   Treatments/Interventions Performed:  Home program, Speech/Language treatment, Patient/Caregiver education and Compensatory strategy training  Other Treatment Interventions:  Speech therapy- 61 mins     Pt naming common object pictures with 100% (10/10).  Mod/max cues for use of SFA (visual/verbal).  Min/mod cues to read at sentence level- reading aloud with 80-90% accuracy, comprehension/understanding of task are barriers.  Max A for written category task, pt required to ID items in a specific category.  Able to read all words aloud, poor comprehension of word.      Speech Therapy Assessment:     Expressive Language Assessment:     Ability to exhibit appropriate naming: Minimal (common items).    Explains function of object Severe.    Patient's ability to use automatic language appropriately is Minimal.    Patient's ability to formulate complex/abstract language is Severe.    Reading Comprehension Assessment:     Patient ability to comprehend single words: Minimal    Patient ability to comprehend simple notes: Moderate    Halting, agrammatical speech.  Severe expressive deficits, mod-severe receptive.  Responds with \"I don't know\" often.      Speech Therapy Plan :   Prognosis: Fair  Therapy Recommendations  Recommendation:  Individual Speech Therapy,  Planned Therapy Interventions:  Home Program, Patient/Caregiver Education, Compensatory Strategy Training and " Speech/Language training,   Frequency:  1x week

## 2018-06-18 ENCOUNTER — SPEECH THERAPY (OUTPATIENT)
Dept: SPEECH THERAPY | Facility: REHABILITATION | Age: 63
End: 2018-06-18
Attending: INTERNAL MEDICINE
Payer: MEDICARE

## 2018-06-18 DIAGNOSIS — R47.01 APHASIA: ICD-10-CM

## 2018-06-18 PROCEDURE — 92507 TX SP LANG VOICE COMM INDIV: CPT

## 2018-06-18 NOTE — OP THERAPY DAILY TREATMENT
"  Outpatient Speech Therapy  DAILY TREATMENT     [facility] Speech [address].  [address]  [place] [address]  Phone:  [phone]  Fax:  [phone]    Date: 06/18/2018    Patient: Drew Melton  YOB: 1955  MRN: [id number]     Time Calculation  Start time: 0901  Stop time: 1000 Time Calculation (min): 59 minutes     Chief Complaint: Aphasia    Visit #: 3    Subjective Evaluation  Pt on time, in good spirits, accompanied by his friend/caregiver.  Home program completed.     Objective:   Treatments/Interventions Performed:  Home program, Speech/Language treatment and Compensatory strategy training  Other Treatment Interventions:  Speech/language tx- 59 mins     Focused on production of canonical sentences with simple action pic stim and written cues for pronoun choice.  Pt able to choose and state correct pronoun with 65% accuracy and generate correct verb with 71%.  Pt's initial statement when seeing a picture is typically a noun, does need cues to follow complete sentence pattern.  Consistent cues required to say \"is\" despite written cues.  Pt able to name to given definition with 43% on own, increased to 71% with semantic or phonemic cues.  Pt able to answer abstract  y/n questions with 86% accuracy, unable to answer any comparison questions despite given max verbal and visual cues.  Pt able to complete written categorization activity identifying which word completes a list of words with 80% spv.     Speech Therapy Assessment:     Expressive Language Assessment:     Ability to exhibit appropriate naming: Moderate.    Explains function of object Severe.    Patient's ability to verbalize wants and needs is Severe.    Patient's ability to sustain dialogues within a given topic is Minimal.    Patient's ability to formulate complex/abstract language is Severe.    Reading Comprehension Assessment:     Patient ability to comprehend single words: Minimal    Reading " comprehension comments: Min/mod at sentence level.       Speech Therapy Plan :   Prognosis: Good  Therapy Recommendations  Recommendation:  Individual Speech Therapy,  Planned Therapy Interventions:  Home Program, Speech/Language training, Patient/Caregiver Education and Compensatory Strategy Training,   Frequency:  1x week

## 2018-06-26 ENCOUNTER — SPEECH THERAPY (OUTPATIENT)
Dept: SPEECH THERAPY | Facility: REHABILITATION | Age: 63
End: 2018-06-26
Attending: INTERNAL MEDICINE
Payer: MEDICARE

## 2018-06-26 DIAGNOSIS — R47.01 APHASIA: ICD-10-CM

## 2018-06-26 PROCEDURE — 92507 TX SP LANG VOICE COMM INDIV: CPT

## 2018-06-26 NOTE — OP THERAPY DAILY TREATMENT
Outpatient Speech Therapy  DAILY TREATMENT     [address]  [address]  [place] [address]  Phone:  [phone]  Fax:  [phone]    Date: 06/26/2018    Patient: Drew Melton  YOB: 1955  MRN: [id number]     Time Calculation  Start time: 0831  Stop time: 0930 Time Calculation (min): 59 minutes     Chief Complaint: Aphasia    Visit #: 4    Subjective Evaluation  Pt on time, accompanied by his friend Eris.  Home program completed.  Per friend some encouragement needed to complete home program.     Objective:   Treatments/Interventions Performed:  Home program, Speech/Language treatment, Patient/Caregiver education and Compensatory strategy training  Other Treatment Interventions:  Speech/language tx- 59 mins     Pt trying to discuss his family and unable to recall his son's name.  Friend not able to remember either as they are not very close.  Pt did recall and we worked on a bio sheet with family members names as well as pt's b-day which he notes he has trouble with.  Also added on jobs pt has had in the past. Pt able to name 1-2 items in a concrete category w/o cues, increased to 3-4 with min/mod semantic cues.  Pt answering sentence level comprehension questions with 30% accuracy, does give up quickly.  Discussed home program options.  Pt does listen to the Bible on tape and reads his Bible.  He does puzzles and does get out in the community frequently.  Also watches educational shows on tv.  Did well with basic category activity and completed with 90% accuracy (completing the list with multiple choice responses).      Assessments  A-grammatical speech, mostly nouns with some verb phrases.  Mod/max cues to stay on-topic.  Mod/severe expressive aphasia.     Speech Therapy Plan :   Prognosis: Fair  Therapy Recommendations  Recommendation:  Individual Speech Therapy,  Planned Therapy Interventions:  Home Program, Patient/Caregiver Education,  Speech/Language training and Compensatory Strategy Training,   Frequency:  1x week

## 2018-06-27 ENCOUNTER — ANTICOAGULATION VISIT (OUTPATIENT)
Dept: VASCULAR LAB | Facility: MEDICAL CENTER | Age: 63
End: 2018-06-27
Attending: INTERNAL MEDICINE
Payer: MEDICARE

## 2018-06-27 DIAGNOSIS — Z79.01 CHRONIC ANTICOAGULATION: ICD-10-CM

## 2018-06-27 LAB — INR PPP: 3 (ref 2–3.5)

## 2018-06-27 PROCEDURE — 99211 OFF/OP EST MAY X REQ PHY/QHP: CPT | Performed by: PHARMACIST

## 2018-06-27 PROCEDURE — 85610 PROTHROMBIN TIME: CPT

## 2018-06-27 NOTE — PROGRESS NOTES
Anticoagulation Summary  As of 6/27/2018    INR goal:   2.0-3.0   TTR:   66.2 % (3 y)   Today's INR:   3.0   Warfarin maintenance plan:   1.25 mg (2.5 mg x 0.5) on Wed; 2.5 mg (2.5 mg x 1) all other days   Weekly warfarin total:   16.25 mg   Plan last modified:   Lyndsey SevillaD (9/30/2015)   Next INR check:   8/22/2018   Target end date:   Indefinite    Indications    Chronic anticoagulation [Z79.01]  Deep vein thrombosis [453.40] (Resolved) [I82.409]  Stroke [434.91] (Resolved) [I63.9]  Deep vein thrombosis (DVT) (HCC) (Resolved) [I82.409]             Anticoagulation Episode Summary     INR check location:   [facility]    Preferred lab:       Send INR reminders to:       Comments:         Anticoagulation Care Providers     Provider Role Specialty Phone number    Jamari Platt M.D. Referring Family Medicine [phone]    [facility]   [phone]        Anticoagulation Patient Findings      HPI:  Drew Melton seen in clinic today, on anticoagulation therapy with warfarin for Hx DVT  Changes to current medical/health status since last appt: denies  Denies signs/symptoms of bleeding and/or thrombosis since the last appt.    Denies any interval changes to diet  Denies any interval changes to medications since last appt.   Denies any complications or cost restrictions with current therapy.   BP declined      A/P   INR  is-therapeutic.   Will continue with the same warfarin dosing.    Follow up appointment in 8 week(s).    Marlen López, PharmD

## 2018-06-28 LAB — INR BLD: 3 (ref 0.9–1.2)

## 2018-07-03 ENCOUNTER — SPEECH THERAPY (OUTPATIENT)
Dept: SPEECH THERAPY | Facility: REHABILITATION | Age: 63
End: 2018-07-03
Attending: INTERNAL MEDICINE
Payer: MEDICARE

## 2018-07-03 DIAGNOSIS — R47.01 APHASIA: ICD-10-CM

## 2018-07-03 PROCEDURE — 92507 TX SP LANG VOICE COMM INDIV: CPT

## 2018-07-03 PROCEDURE — G9163 LANG EXPRESS GOAL STATUS: HCPCS | Mod: CK

## 2018-07-03 PROCEDURE — G9164 LANG EXPRESS D/C STATUS: HCPCS | Mod: CK

## 2018-07-03 NOTE — OP THERAPY DISCHARGE SUMMARY
Outpatient Speech Therapy  DISCHARGE SUMMARY NOTE      [facility] Speech Therapy [place] [address]  [address]  [place] [address]  Phone:  [phone]  Fax:  [phone]    Date of Visit: 07/03/2018    Patient: Drew Melton  YOB: 1955  MRN: [id number]     Referring Provider: Jamari Platt M.D.  [address]  [address]  [place] [address]   Referring Diagnosis: No admission diagnoses are documented for this encounter.     Visit #: 5    Time Calculation             Speech Therapy Occurrence Codes    Date of Onset of Impairment:  5/22/18   Date speech therapy care plan established or reviewed:  6/5/18   Date speech therapy treatment started:  6/5/18          Chief Complaint: Aphasia    Visit Diagnoses     ICD-10-CM   1. Aphasia R47.01       Subjective Evaluation  Very minimal to no noted progress made however receptive to education and home program options.  Caregiver sat in for all sessions for carryover.     Objective:   Treatments/Interventions Performed:  Patient/Caregiver education, Speech/Language treatment, Compensatory strategy training and Home program      Speech Therapy Assessment:     Expressive Language Assessment:     Communicates using gestures: Moderate.    Ability to exhibit appropriate naming: Minimal.    Explains function of object Severe.    Repeats speech accurately Moderate.    Patient's ability to use automatic language appropriately is Minimal.    Patient's ability to verbalize wants and needs is Severe.    Patient's ability to sustain dialogues within a given topic is Minimal.    Patient's ability to formulate complex/abstract language is Severe.    Paraphasic is Present.    Perseveration is Present.    Receptive Language Assessment:     Complex yes/no questions: Moderate    Patient follows 1 step simple commands: Supervision Required    Patient follows 2 step simple commands:Severe    Patient understands simple conversation: Minimal    Patient  understands complex conversation: Moderate    Reading Comprehension Assessment:     Patient ability to comprehend short phrases: Minimal    Patient ability to comprehend simple paragraphs: Severe    Reading comprehension comments: Max cues for functional reading, abandons task quickly.       Speech Therapy Plan :   Prognosis & Recommendations  Impression Summary:  Long history of Mod-severe expressive and receptive aphasia.  Would benefit from continuing with home program and use of strategies. Pt did not meet his goals, fair motivation.   Patient progression on Short Term Goals:   1.  Pt will demo use of SFA when presented pic stim given min cues 70% accuracy.GOAL NOT MET- MOD TO MAX CUES FOR EXPANSION AND INITIATION.   2.  Pt will state canonical simple sentence when shown simple action picture or given a verb 60% min cues. GOAL NOT MET- PT IS 90% FOR STATING VERB, MAX WRITTEN AND VERBAL CUES FOR PRONOUN WITH IS/ARE  3.  Pt will demo functional reading skills (menus, receipts, tables) 70% with min cues. GOAL NOT MET-ABANDONS TASK QUICKLY IF CHALLENGING.  DOES READ AT SENTENCE LEVEL WITH 80%.    4.  Pt will follow 2 step auditory instructions given one repetition 60% accuracy.  GOAL NOT MET-FOLLOWS ONE STEP ONLY WITH GOOD ACCURACY.  NEEDS 2 STEP TO BE BROKEN DOWN AND REPEATED.   Therapy Recommendations  Recommendation:  No further speech therapy indicated at this time,        Functional Limitation G-Codes and Severity Modifiers     Goal:   CK   Discharge:   CK

## 2018-07-03 NOTE — OP THERAPY DAILY TREATMENT
"  Outpatient Speech Therapy  DAILY TREATMENT     [facility] [address].  [address]  [place] [address]  Phone:  [phone]  Fax:  [phone]    Date: 07/03/2018    Patient: Drew Melton  YOB: 1955  MRN: [id number]     Time Calculation  Start time: 0933  Stop time: 1030 Time Calculation (min): 57 minutes     Chief Complaint: Aphasia    Visit #: 5    Subjective Evaluation  Pt in good spirits, accompanied by his caregiver/friend. Home program completed.     Objective:   Treatments/Interventions Performed:  Patient/Caregiver education, Home program, Speech/Language treatment and Compensatory strategy training  Other Treatment Interventions:  Speech/language tx- 57 mins     Pt is naming common objects with 90% accuracy (9/10).  Requires mod to max cues to provide descriptions of items (SFA). Pt speaks primarily in noun/verb form highly a grammatical.  Also perseverates on ideas and becomes quickly off-topic.  Pt able to follow 1-step instructions with 100% accuracy, no success with 2-step unless instructions are broken down with multiple repetitions.  Reading at sentence level with multiple choice options for comprehension (Fo 3) 80% accuracy.  Not yet successful with functional reading tasks.  Responds \"ROSARIO\" before really trying to look for response. If ideas don't personally affect pt he responds he doesn't know.     Assessments  Mod-severe expressive and mod receptive aphasia for 17 yrs.  Strategies introduced with some positive response.  Education to caregiver.  Discussed some home program options.     Speech Therapy Plan :   Therapy Recommendations  Recommendation: No further speech therapy indicated at this time,  Plan Details:  Cont with discussed home program including recommended ipad apps since ipad was recently purchased.  Cont with use of speech strategies if effective.                "

## 2018-07-16 ENCOUNTER — TELEPHONE (OUTPATIENT)
Dept: MEDICAL GROUP | Facility: MEDICAL CENTER | Age: 63
End: 2018-07-16

## 2018-07-16 NOTE — TELEPHONE ENCOUNTER
Future Appointments       Provider Department Center    7/23/2018 10:20 AM Jamari Platt M.D.; [facility]  [facility] [place]    8/22/2018 11:00 AM [facility] EXAM 4 [facility]        'ANNUAL WELLNESS VISIT PRE-VISIT PLANNING WITHOUT OUTREACH    1.  Reviewed note from last office visit with PCP: YES 05/22/2018    2.  If any orders were placed at last visit, do we have Results/Consult Notes?        •  Labs - Labs ordered, NOT completed. Patient advised to complete prior to next appointment.  Note: If patient appointment is for lab review and patient did not complete labs, check with provider if OK to reschedule patient until labs completed.       •  Imaging - Imaging was not ordered at last office visit.       •  Referrals - Referral ordered, patient has NOT been seen.    3.  Immunizations were updated in [place] using WebIZ?: Yes       •  WebIZ Recommendations: FLU, TD and SHINGRIX (Shingles)        •  Is patient due for Tdap? NO       •  Is patient due for Shingles? YES. Patient was not notified of copay/out of pocket cost.     4.  Patient is due for the following Health Maintenance Topics:   Health Maintenance Due   Topic Date Due   • Annual Wellness Visit  06/22/2018       5.  Reviewed/Updated the following with patient:       •   Preferred Pharmacy? YES       •   Preferred Lab? YES       •   Preferred Communication? NO       •   Allergies? YES       •   Medications? YES. Was Abstract Encounter opened and chart updated? YES       •   Social History? YES. Was Abstract Encounter opened and chart updated? YES       •   Family History (document living status of immediate family members and if + hx of  cancer, diabetes, hypertension, hyperlipidemia, heart attack, stroke) NO    6.  Care Team Updated:       •   DME Company (gait device, O2, CPAP, etc.): N\A       •   Other Specialists (eye doctor, derm, GYN,  cardiology, endo, etc): N\A    7.  Patient has the following Care Path diagnoses on Problem List:  NONE    8.  MDX printed and highlighted for Provider? NO    9.  Patient was advised: “This is a free wellness visit. The provider will screen for medical conditions to help you stay healthy. If you have other concerns to address you may be asked to discuss these at a separate visit or there may be an additional fee.”     10.  Patient was informed to arrive 15 min prior to their scheduled appointment and bring in their medication bottles.

## 2018-07-16 NOTE — TELEPHONE ENCOUNTER
Left message for patient to call back regarding pre-visit planning. Please transfer call to [phone].

## 2018-07-19 ENCOUNTER — HOSPITAL ENCOUNTER (OUTPATIENT)
Dept: LAB | Facility: MEDICAL CENTER | Age: 63
End: 2018-07-19
Attending: INTERNAL MEDICINE
Payer: MEDICARE

## 2018-07-19 DIAGNOSIS — E78.5 DYSLIPIDEMIA (HIGH LDL; LOW HDL): Chronic | ICD-10-CM

## 2018-07-19 DIAGNOSIS — Z12.5 SCREENING FOR MALIGNANT NEOPLASM OF PROSTATE: ICD-10-CM

## 2018-07-19 LAB
ALBUMIN SERPL BCP-MCNC: 4.3 G/DL (ref 3.2–4.9)
ALBUMIN/GLOB SERPL: 1.4 G/DL
ALP SERPL-CCNC: 48 U/L (ref 30–99)
ALT SERPL-CCNC: 32 U/L (ref 2–50)
ANION GAP SERPL CALC-SCNC: 8 MMOL/L (ref 0–11.9)
AST SERPL-CCNC: 29 U/L (ref 12–45)
BILIRUB SERPL-MCNC: 1.2 MG/DL (ref 0.1–1.5)
BUN SERPL-MCNC: 22 MG/DL (ref 8–22)
CALCIUM SERPL-MCNC: 10.1 MG/DL (ref 8.5–10.5)
CHLORIDE SERPL-SCNC: 108 MMOL/L (ref 96–112)
CHOLEST SERPL-MCNC: 149 MG/DL (ref 100–199)
CO2 SERPL-SCNC: 26 MMOL/L (ref 20–33)
CREAT SERPL-MCNC: 0.95 MG/DL (ref 0.5–1.4)
GLOBULIN SER CALC-MCNC: 3 G/DL (ref 1.9–3.5)
GLUCOSE SERPL-MCNC: 86 MG/DL (ref 65–99)
HDLC SERPL-MCNC: 29 MG/DL
LDLC SERPL CALC-MCNC: 82 MG/DL
POTASSIUM SERPL-SCNC: 4.1 MMOL/L (ref 3.6–5.5)
PROT SERPL-MCNC: 7.3 G/DL (ref 6–8.2)
PSA SERPL-MCNC: 0.52 NG/ML (ref 0–4)
SODIUM SERPL-SCNC: 142 MMOL/L (ref 135–145)
TRIGL SERPL-MCNC: 192 MG/DL (ref 0–149)

## 2018-07-19 PROCEDURE — 80061 LIPID PANEL: CPT

## 2018-07-19 PROCEDURE — 84153 ASSAY OF PSA TOTAL: CPT | Mod: GA

## 2018-07-19 PROCEDURE — 80053 COMPREHEN METABOLIC PANEL: CPT

## 2018-07-19 PROCEDURE — 36415 COLL VENOUS BLD VENIPUNCTURE: CPT

## 2018-07-23 ENCOUNTER — OFFICE VISIT (OUTPATIENT)
Dept: MEDICAL GROUP | Facility: MEDICAL CENTER | Age: 63
End: 2018-07-23
Payer: MEDICARE

## 2018-07-23 VITALS
OXYGEN SATURATION: 94 % | HEART RATE: 66 BPM | WEIGHT: 162 LBS | SYSTOLIC BLOOD PRESSURE: 124 MMHG | TEMPERATURE: 97.8 F | BODY MASS INDEX: 21.94 KG/M2 | DIASTOLIC BLOOD PRESSURE: 68 MMHG | HEIGHT: 72 IN | RESPIRATION RATE: 16 BRPM

## 2018-07-23 DIAGNOSIS — Z99.3 WHEELCHAIR BOUND: ICD-10-CM

## 2018-07-23 DIAGNOSIS — I69.959 HEMIPLEGIA OF DOMINANT SIDE AS LATE EFFECT FOLLOWING CEREBROVASCULAR DISEASE (HCC): ICD-10-CM

## 2018-07-23 DIAGNOSIS — E78.5 DYSLIPIDEMIA (HIGH LDL; LOW HDL): Chronic | ICD-10-CM

## 2018-07-23 DIAGNOSIS — R53.81 PHYSICAL DECONDITIONING: ICD-10-CM

## 2018-07-23 DIAGNOSIS — Z86.73 H/O: CVA (CEREBROVASCULAR ACCIDENT): ICD-10-CM

## 2018-07-23 DIAGNOSIS — Z79.01 CHRONIC ANTICOAGULATION: ICD-10-CM

## 2018-07-23 DIAGNOSIS — Z00.00 MEDICARE ANNUAL WELLNESS VISIT, SUBSEQUENT: ICD-10-CM

## 2018-07-23 DIAGNOSIS — Z00.00 HEALTH CARE MAINTENANCE: ICD-10-CM

## 2018-07-23 DIAGNOSIS — Z89.519 S/P BKA (BELOW KNEE AMPUTATION) UNILATERAL (HCC): ICD-10-CM

## 2018-07-23 DIAGNOSIS — I69.919 COGNITIVE DEFICITS AS LATE EFFECT OF CEREBROVASCULAR DISEASE: ICD-10-CM

## 2018-07-23 PROBLEM — H61.23 BILATERAL IMPACTED CERUMEN: Status: RESOLVED | Noted: 2018-05-22 | Resolved: 2018-07-23

## 2018-07-23 PROCEDURE — G0439 PPPS, SUBSEQ VISIT: HCPCS | Performed by: INTERNAL MEDICINE

## 2018-07-23 ASSESSMENT — ENCOUNTER SYMPTOMS: GENERAL WELL-BEING: EXCELLENT

## 2018-07-23 ASSESSMENT — PATIENT HEALTH QUESTIONNAIRE - PHQ9: CLINICAL INTERPRETATION OF PHQ2 SCORE: 0

## 2018-07-23 ASSESSMENT — ACTIVITIES OF DAILY LIVING (ADL): BATHING_REQUIRES_ASSISTANCE: 0
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
## 2018-08-22 ENCOUNTER — ANTICOAGULATION VISIT (OUTPATIENT)
Dept: VASCULAR LAB | Facility: MEDICAL CENTER | Age: 63
End: 2018-08-22
Attending: INTERNAL MEDICINE
Payer: MEDICARE

## 2018-08-22 DIAGNOSIS — Z79.01 CHRONIC ANTICOAGULATION: ICD-10-CM

## 2018-08-22 LAB — INR PPP: 2.1 (ref 2–3.5)

## 2018-08-22 PROCEDURE — 85610 PROTHROMBIN TIME: CPT

## 2018-08-22 PROCEDURE — 99211 OFF/OP EST MAY X REQ PHY/QHP: CPT | Performed by: PHARMACIST

## 2018-08-22 NOTE — PROGRESS NOTES
Anticoagulation Summary  As of 8/22/2018    INR goal:   2.0-3.0   TTR:   67.8 % (3.2 y)   Today's INR:   2.1   Warfarin maintenance plan:   1.25 mg (2.5 mg x 0.5) on Wed; 2.5 mg (2.5 mg x 1) all other days   Weekly warfarin total:   16.25 mg   Plan last modified:   Gary Wall, PharmD (9/30/2015)   Next INR check:   10/31/2018   Target end date:   Indefinite    Indications    Chronic anticoagulation [Z79.01]  Deep vein thrombosis [453.40] (Resolved) [I82.409]  Stroke [434.91] (Resolved) [I63.9]  Deep vein thrombosis (DVT) (HCC) (Resolved) [I82.409]             Anticoagulation Episode Summary     [facility] check [facility]   [facility]    Preferred lab:       Send INR reminders to:       Comments:         Anticoagulation Care Providers     Provider Role Specialty Phone number    Jamari Platt M.D. Referring Family Medicine [phone]    [facility]   [phone]        Anticoagulation Patient Findings  Patient Findings     Negatives:   Signs/symptoms of thrombosis, Signs/symptoms of bleeding, Laboratory test error suspected, Change in health, Change in alcohol use, Change in activity, Upcoming invasive procedure, Emergency department visit, Upcoming dental procedure, Missed doses, Extra doses, Change in medications, Change in diet/appetite, Hospital admission, Bruising, Other complaints        HPI:   Drew Melton seen in clinic today, on anticoagulation therapy with warfarin for blood clot prevention due to history of stroke and DVT.    Patient's previous INR was therapeutic at 3.0 on 6-27-18, at which time patient was instructed to continue with current warfarin regimen.  He returns to clinic today to recheck INR to ensure it is therapeutic and thus preventing possible clotting and/or bleeding/bruising complications.    CHADS-VASc = n/a  (unadjusted ischemic stroke risk/year:  n/a)    Does patient have any changes to current medical/health status since last appt (Y/N):  NO  Does  "patient have any signs/symptoms of bleeding and/or thrombosis since the last appt (Y/N):  NO  Does patient have any interval changes to diet or medications since last appt (Y/N):  NO  Are there any complications or cost restrictions with current therapy (Y/N):  NO      Vitals:  BP check declined at today's visit    Weight  declines   Height   6' 0\"     Asssessment:      INR remain therapeutic at 2.1, therefore decreasing patient's risk of stroke, blood clots, and bleeding complications.   Reason(s) for out of range INR today:  n/a      Plan:  Pt is to continue with current warfarin dosing regimen.     Follow up:  Because warfarin is a high risk medication and current CHEST guidelines recommend regular monitoring intervals (few days up to 12 weeks), will have patient return to clinic in 10 weeks to recheck INR.    Elias Peña, PharmD    "

## 2018-08-23 LAB — INR BLD: 2.1 (ref 0.9–1.2)

## 2018-09-01 DIAGNOSIS — E87.6 HYPOKALEMIA: ICD-10-CM

## 2018-09-01 DIAGNOSIS — E78.5 DYSLIPIDEMIA (HIGH LDL; LOW HDL): Chronic | ICD-10-CM

## 2018-09-04 RX ORDER — LOVASTATIN 10 MG/1
TABLET ORAL
Qty: 90 TAB | Refills: 3 | Status: SHIPPED | OUTPATIENT
Start: 2018-09-04 | End: 2019-07-25

## 2018-09-04 RX ORDER — FENOFIBRATE 48 MG/1
TABLET, COATED ORAL
Qty: 90 TAB | Refills: 3 | Status: SHIPPED | OUTPATIENT
Start: 2018-09-04 | End: 2019-07-25 | Stop reason: SDUPTHER

## 2018-09-04 RX ORDER — POTASSIUM CHLORIDE 1500 MG/1
TABLET, EXTENDED RELEASE ORAL
Qty: 90 TAB | Refills: 3 | Status: SHIPPED | OUTPATIENT
Start: 2018-09-04 | End: 2019-09-17 | Stop reason: SDUPTHER

## 2018-10-30 ENCOUNTER — OFFICE VISIT (OUTPATIENT)
Dept: MEDICAL GROUP | Facility: MEDICAL CENTER | Age: 63
End: 2018-10-30
Payer: MEDICARE

## 2018-10-30 VITALS
BODY MASS INDEX: 21.95 KG/M2 | RESPIRATION RATE: 14 BRPM | WEIGHT: 162.04 LBS | TEMPERATURE: 97.7 F | HEIGHT: 72 IN | HEART RATE: 77 BPM | OXYGEN SATURATION: 93 % | DIASTOLIC BLOOD PRESSURE: 80 MMHG | SYSTOLIC BLOOD PRESSURE: 120 MMHG

## 2018-10-30 DIAGNOSIS — K62.89 RECTAL PAIN: ICD-10-CM

## 2018-10-30 DIAGNOSIS — A63.0 ANAL WART: ICD-10-CM

## 2018-10-30 DIAGNOSIS — Z23 NEEDS FLU SHOT: ICD-10-CM

## 2018-10-30 DIAGNOSIS — R21 SKIN RASH: ICD-10-CM

## 2018-10-30 DIAGNOSIS — K64.9 HEMORRHOIDS, UNSPECIFIED HEMORRHOID TYPE: ICD-10-CM

## 2018-10-30 PROCEDURE — 90686 IIV4 VACC NO PRSV 0.5 ML IM: CPT | Performed by: INTERNAL MEDICINE

## 2018-10-30 PROCEDURE — 99214 OFFICE O/P EST MOD 30 MIN: CPT | Mod: 25 | Performed by: INTERNAL MEDICINE

## 2018-10-30 PROCEDURE — G0008 ADMIN INFLUENZA VIRUS VAC: HCPCS | Performed by: INTERNAL MEDICINE

## 2018-10-30 RX ORDER — BENZOCAINE/MENTHOL 6 MG-10 MG
LOZENGE MUCOUS MEMBRANE
Qty: 1 TUBE | Refills: 3 | Status: SHIPPED | OUTPATIENT
Start: 2018-10-30 | End: 2019-03-12
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
## 2018-10-31 ENCOUNTER — ANTICOAGULATION VISIT (OUTPATIENT)
Dept: VASCULAR LAB | Facility: MEDICAL CENTER | Age: 63
End: 2018-10-31
Attending: INTERNAL MEDICINE
Payer: MEDICARE

## 2018-10-31 DIAGNOSIS — Z79.01 CHRONIC ANTICOAGULATION: ICD-10-CM

## 2018-10-31 LAB — INR PPP: 2 (ref 2–3.5)

## 2018-10-31 PROCEDURE — 85610 PROTHROMBIN TIME: CPT

## 2018-10-31 PROCEDURE — 99211 OFF/OP EST MAY X REQ PHY/QHP: CPT | Performed by: PHARMACIST

## 2018-10-31 NOTE — PROGRESS NOTES
Anticoagulation Summary  As of 10/31/2018    INR goal:   2.0-3.0   TTR:   69.6 % (3.4 y)   Today's INR:   2.0   Warfarin maintenance plan:   1.25 mg (2.5 mg x 0.5) on Wed; 2.5 mg (2.5 mg x 1) all other days   Weekly warfarin total:   16.25 mg   Plan last modified:   Lyndsey SevillaD (9/30/2015)   Next INR check:   1/23/2019   Target end date:   Indefinite    Indications    Chronic anticoagulation [Z79.01]  Deep vein thrombosis [453.40] (Resolved) [I82.409]  Stroke [434.91] (Resolved) [I63.9]  Deep vein thrombosis (DVT) (HCC) (Resolved) [I82.409]             Anticoagulation Episode Summary     INR check location[facility]   [facility]    Preferred lab:       Send INR reminders to:       Comments:         Anticoagulation Care Providers     Provider Role Specialty Phone number    Jamari Platt M.D. Referring Family Medicine [phone]    [facility]   [phone]        Anticoagulation Patient Findings      HPI:  Drew Melton seen in clinic today, on anticoagulation therapy with warfarin for Hx stroke  Changes to current medical/health status since last appt: denies  Denies signs/symptoms of bleeding and/or thrombosis since the last appt.    Denies any interval changes to diet  Denies any interval changes to medications since last appt.   Denies any complications or cost restrictions with current therapy.   BP declined      A/P   INR  is-therapeutic.   Will continue with the same warfarin dosing.    Follow up appointment in 12 week(s).    Marlen López, PharmD

## 2018-11-02 LAB — INR BLD: 2 (ref 0.9–1.2)

## 2018-12-21 ENCOUNTER — OFFICE VISIT (OUTPATIENT)
Dept: MEDICAL GROUP | Facility: MEDICAL CENTER | Age: 63
End: 2018-12-21
Payer: MEDICARE

## 2018-12-21 VITALS
HEART RATE: 78 BPM | BODY MASS INDEX: 21.95 KG/M2 | HEIGHT: 72 IN | TEMPERATURE: 97.1 F | OXYGEN SATURATION: 96 % | DIASTOLIC BLOOD PRESSURE: 66 MMHG | RESPIRATION RATE: 14 BRPM | SYSTOLIC BLOOD PRESSURE: 112 MMHG | WEIGHT: 162.04 LBS

## 2018-12-21 DIAGNOSIS — I69.919 COGNITIVE DEFICITS AS LATE EFFECT OF CEREBROVASCULAR DISEASE: ICD-10-CM

## 2018-12-21 DIAGNOSIS — D69.6 THROMBOCYTOPENIA (HCC): ICD-10-CM

## 2018-12-21 DIAGNOSIS — Z99.3 WHEELCHAIR BOUND: ICD-10-CM

## 2018-12-21 DIAGNOSIS — Z79.01 CHRONIC ANTICOAGULATION: ICD-10-CM

## 2018-12-21 DIAGNOSIS — I69.959 HEMIPLEGIA OF DOMINANT SIDE AS LATE EFFECT FOLLOWING CEREBROVASCULAR DISEASE (HCC): ICD-10-CM

## 2018-12-21 DIAGNOSIS — Z86.73 H/O: CVA (CEREBROVASCULAR ACCIDENT): ICD-10-CM

## 2018-12-21 DIAGNOSIS — E78.5 DYSLIPIDEMIA (HIGH LDL; LOW HDL): Chronic | ICD-10-CM

## 2018-12-21 PROCEDURE — 99214 OFFICE O/P EST MOD 30 MIN: CPT | Performed by: INTERNAL MEDICINE
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
## 2019-01-22 DIAGNOSIS — Z86.73 HISTORY OF STROKE: ICD-10-CM

## 2019-01-23 ENCOUNTER — ANTICOAGULATION VISIT (OUTPATIENT)
Dept: VASCULAR LAB | Facility: MEDICAL CENTER | Age: 64
End: 2019-01-23
Attending: INTERNAL MEDICINE
Payer: MEDICARE

## 2019-01-23 VITALS — DIASTOLIC BLOOD PRESSURE: 73 MMHG | HEART RATE: 69 BPM | SYSTOLIC BLOOD PRESSURE: 143 MMHG

## 2019-01-23 DIAGNOSIS — Z79.01 CHRONIC ANTICOAGULATION: ICD-10-CM

## 2019-01-23 LAB — INR PPP: 2.2 (ref 2–3.5)

## 2019-01-23 PROCEDURE — 99211 OFF/OP EST MAY X REQ PHY/QHP: CPT | Performed by: NURSE PRACTITIONER

## 2019-01-23 PROCEDURE — 85610 PROTHROMBIN TIME: CPT

## 2019-01-23 RX ORDER — WARFARIN SODIUM 2.5 MG/1
TABLET ORAL
Qty: 90 TAB | Refills: 1 | Status: SHIPPED | OUTPATIENT
Start: 2019-01-23 | End: 2019-08-07

## 2019-01-23 NOTE — PROGRESS NOTES
Anticoagulation Summary  As of 1/23/2019    INR goal:   2.0-3.0   TTR:   71.6 % (3.6 y)   Today's INR:   2.2   Warfarin maintenance plan:   1.25 mg (2.5 mg x 0.5) on Wed; 2.5 mg (2.5 mg x 1) all other days   Weekly warfarin total:   16.25 mg   No change documented:   Khushboo Wiseman, A.P.N.   Plan last modified:   Gary Wall, PharmD (9/30/2015)   Next INR check:   4/17/2019   Target end date:   Indefinite    Indications    Chronic anticoagulation [Z79.01]  Deep vein thrombosis [453.40] (Resolved) [I82.409]  Stroke [434.91] (Resolved) [I63.9]  Deep vein thrombosis (DVT) (HCC) (Resolved) [I82.409]             Anticoagulation Episode Summary     INR check location:   [facility]    Preferred lab:       Send INR reminders to:       Comments:         Anticoagulation Care Providers     Provider Role Specialty Phone number    Jamari Platt M.D. Referring Family Medicine [phone]    [facility]   [phone]        Anticoagulation Patient Findings      HPI:  Drew Melton seen in clinic today for follow up on anticoagulation therapy in the presence of CVA, DVT hx. Denies any changes to current medical/health status since last appointment. Denies any medication or diet changes. No current symptoms of bleeding or thrombosis reported.    A/P:   INR therapeutic. Continue current regimen. BP recorded in vitals.    Follow up appointment in 12 week(s).    Next Appointment: Wednesday, April 17, 2019 at 11:00 am.     Khushboo FREED

## 2019-01-24 LAB — INR BLD: 2.2 (ref 0.9–1.2)

## 2019-03-12 ENCOUNTER — APPOINTMENT (OUTPATIENT)
Dept: RADIOLOGY | Facility: MEDICAL CENTER | Age: 64
End: 2019-03-12
Attending: EMERGENCY MEDICINE
Payer: MEDICARE

## 2019-03-12 ENCOUNTER — HOSPITAL ENCOUNTER (EMERGENCY)
Facility: MEDICAL CENTER | Age: 64
End: 2019-03-13
Attending: EMERGENCY MEDICINE
Payer: MEDICARE

## 2019-03-12 DIAGNOSIS — M54.50 LUMBAR BACK PAIN: ICD-10-CM

## 2019-03-12 DIAGNOSIS — M54.9 PAIN, UPPER BACK: ICD-10-CM

## 2019-03-12 LAB
ANION GAP SERPL CALC-SCNC: 9 MMOL/L (ref 0–11.9)
BASOPHILS # BLD AUTO: 0.6 % (ref 0–1.8)
BASOPHILS # BLD: 0.04 K/UL (ref 0–0.12)
BUN SERPL-MCNC: 26 MG/DL (ref 8–22)
CALCIUM SERPL-MCNC: 9.9 MG/DL (ref 8.5–10.5)
CHLORIDE SERPL-SCNC: 110 MMOL/L (ref 96–112)
CO2 SERPL-SCNC: 24 MMOL/L (ref 20–33)
CREAT SERPL-MCNC: 0.87 MG/DL (ref 0.5–1.4)
EOSINOPHIL # BLD AUTO: 0.12 K/UL (ref 0–0.51)
EOSINOPHIL NFR BLD: 1.8 % (ref 0–6.9)
ERYTHROCYTE [DISTWIDTH] IN BLOOD BY AUTOMATED COUNT: 45.4 FL (ref 35.9–50)
GLUCOSE SERPL-MCNC: 109 MG/DL (ref 65–99)
HCT VFR BLD AUTO: 42.9 % (ref 42–52)
HGB BLD-MCNC: 14.7 G/DL (ref 14–18)
IMM GRANULOCYTES # BLD AUTO: 0.01 K/UL (ref 0–0.11)
IMM GRANULOCYTES NFR BLD AUTO: 0.2 % (ref 0–0.9)
LYMPHOCYTES # BLD AUTO: 1.74 K/UL (ref 1–4.8)
LYMPHOCYTES NFR BLD: 26.8 % (ref 22–41)
MCH RBC QN AUTO: 31.7 PG (ref 27–33)
MCHC RBC AUTO-ENTMCNC: 34.3 G/DL (ref 33.7–35.3)
MCV RBC AUTO: 92.7 FL (ref 81.4–97.8)
MONOCYTES # BLD AUTO: 0.47 K/UL (ref 0–0.85)
MONOCYTES NFR BLD AUTO: 7.2 % (ref 0–13.4)
NEUTROPHILS # BLD AUTO: 4.12 K/UL (ref 1.82–7.42)
NEUTROPHILS NFR BLD: 63.4 % (ref 44–72)
NRBC # BLD AUTO: 0 K/UL
NRBC BLD-RTO: 0 /100 WBC
PLATELET # BLD AUTO: 187 K/UL (ref 164–446)
PMV BLD AUTO: 9.6 FL (ref 9–12.9)
POTASSIUM SERPL-SCNC: 3.8 MMOL/L (ref 3.6–5.5)
RBC # BLD AUTO: 4.63 M/UL (ref 4.7–6.1)
SODIUM SERPL-SCNC: 143 MMOL/L (ref 135–145)
WBC # BLD AUTO: 6.5 K/UL (ref 4.8–10.8)

## 2019-03-12 PROCEDURE — 99285 EMERGENCY DEPT VISIT HI MDM: CPT

## 2019-03-12 PROCEDURE — 72100 X-RAY EXAM L-S SPINE 2/3 VWS: CPT

## 2019-03-12 PROCEDURE — 700102 HCHG RX REV CODE 250 W/ 637 OVERRIDE(OP): Performed by: EMERGENCY MEDICINE

## 2019-03-12 PROCEDURE — 71046 X-RAY EXAM CHEST 2 VIEWS: CPT

## 2019-03-12 PROCEDURE — A9270 NON-COVERED ITEM OR SERVICE: HCPCS | Performed by: EMERGENCY MEDICINE

## 2019-03-12 PROCEDURE — 80048 BASIC METABOLIC PNL TOTAL CA: CPT

## 2019-03-12 PROCEDURE — 85025 COMPLETE CBC W/AUTO DIFF WBC: CPT

## 2019-03-12 PROCEDURE — 81001 URINALYSIS AUTO W/SCOPE: CPT

## 2019-03-12 RX ORDER — DIAZEPAM 5 MG/1
5 TABLET ORAL ONCE
Status: COMPLETED | OUTPATIENT
Start: 2019-03-12 | End: 2019-03-12

## 2019-03-12 RX ORDER — IBUPROFEN 600 MG/1
600 TABLET ORAL ONCE
Status: COMPLETED | OUTPATIENT
Start: 2019-03-12 | End: 2019-03-12

## 2019-03-12 RX ADMIN — IBUPROFEN 600 MG: 600 TABLET ORAL at 23:03

## 2019-03-12 RX ADMIN — DIAZEPAM 5 MG: 5 TABLET ORAL at 23:04

## 2019-03-12 ASSESSMENT — LIFESTYLE VARIABLES: DO YOU DRINK ALCOHOL: NO

## 2019-03-13 ENCOUNTER — APPOINTMENT (OUTPATIENT)
Dept: RADIOLOGY | Facility: MEDICAL CENTER | Age: 64
End: 2019-03-13
Attending: EMERGENCY MEDICINE
Payer: MEDICARE

## 2019-03-13 VITALS
TEMPERATURE: 98.9 F | RESPIRATION RATE: 19 BRPM | BODY MASS INDEX: 22.08 KG/M2 | OXYGEN SATURATION: 92 % | HEART RATE: 81 BPM | DIASTOLIC BLOOD PRESSURE: 87 MMHG | WEIGHT: 163 LBS | SYSTOLIC BLOOD PRESSURE: 147 MMHG | HEIGHT: 72 IN

## 2019-03-13 LAB
APPEARANCE UR: CLEAR
BACTERIA #/AREA URNS HPF: NEGATIVE /HPF
BILIRUB UR QL STRIP.AUTO: NEGATIVE
COLOR UR: YELLOW
EPI CELLS #/AREA URNS HPF: NEGATIVE /HPF
GLUCOSE UR STRIP.AUTO-MCNC: NEGATIVE MG/DL
HYALINE CASTS #/AREA URNS LPF: ABNORMAL /LPF
KETONES UR STRIP.AUTO-MCNC: NEGATIVE MG/DL
LEUKOCYTE ESTERASE UR QL STRIP.AUTO: NEGATIVE
MICRO URNS: ABNORMAL
NITRITE UR QL STRIP.AUTO: NEGATIVE
PH UR STRIP.AUTO: 7 [PH]
PROT UR QL STRIP: NEGATIVE MG/DL
RBC # URNS HPF: ABNORMAL /HPF
RBC UR QL AUTO: ABNORMAL
SP GR UR STRIP.AUTO: 1.02
UROBILINOGEN UR STRIP.AUTO-MCNC: 0.2 MG/DL
WBC #/AREA URNS HPF: ABNORMAL /HPF

## 2019-03-13 PROCEDURE — 700102 HCHG RX REV CODE 250 W/ 637 OVERRIDE(OP): Performed by: EMERGENCY MEDICINE

## 2019-03-13 PROCEDURE — A9270 NON-COVERED ITEM OR SERVICE: HCPCS | Performed by: EMERGENCY MEDICINE

## 2019-03-13 PROCEDURE — 700117 HCHG RX CONTRAST REV CODE 255: Performed by: EMERGENCY MEDICINE

## 2019-03-13 PROCEDURE — 71275 CT ANGIOGRAPHY CHEST: CPT

## 2019-03-13 RX ORDER — HYDROCODONE BITARTRATE AND ACETAMINOPHEN 5; 325 MG/1; MG/1
1 TABLET ORAL ONCE
Status: COMPLETED | OUTPATIENT
Start: 2019-03-13 | End: 2019-03-13

## 2019-03-13 RX ORDER — ACETAMINOPHEN 325 MG/1
325 TABLET ORAL EVERY 6 HOURS PRN
Qty: 30 TAB | Refills: 0 | Status: SHIPPED | OUTPATIENT
Start: 2019-03-13 | End: 2019-03-16

## 2019-03-13 RX ORDER — CYCLOBENZAPRINE HCL 5 MG
5-10 TABLET ORAL 3 TIMES DAILY PRN
Qty: 30 TAB | Refills: 0 | Status: SHIPPED | OUTPATIENT
Start: 2019-03-13 | End: 2019-03-18 | Stop reason: SDUPTHER

## 2019-03-13 RX ADMIN — IOHEXOL 80 ML: 350 INJECTION, SOLUTION INTRAVENOUS at 01:35

## 2019-03-13 RX ADMIN — HYDROCODONE BITARTRATE AND ACETAMINOPHEN 1 TABLET: 5; 325 TABLET ORAL at 00:30
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
## 2019-03-13 NOTE — ED TRIAGE NOTES
"Chief Complaint   Patient presents with   • Back Pain     mid upper back.     /87   Pulse 93   Temp 37.2 °C (99 °F) (Temporal)   Resp 18   Ht 1.829 m (6')   Wt 73.9 kg (163 lb)   SpO2 92%   BMI 22.11 kg/m²     Pt has been having mid upper back pain for two days. Pt has hx of back pain. \"just can't get comfortable.\"     No new numbness or tingling.   no loss of bladder or bowel control.    "

## 2019-03-13 NOTE — ED NOTES
Discharge instructions given to pt. Prescriptions handed to pt and caregiver at bedside. Pt educated, verbalizes understanding. All belongings accounted for. Pt wheeled out of ED with caregiver and personal wheelchair. PIV removed and dressing applied.

## 2019-03-18 ENCOUNTER — OFFICE VISIT (OUTPATIENT)
Dept: MEDICAL GROUP | Facility: MEDICAL CENTER | Age: 64
End: 2019-03-18
Payer: MEDICARE

## 2019-03-18 VITALS
BODY MASS INDEX: 19.77 KG/M2 | OXYGEN SATURATION: 95 % | DIASTOLIC BLOOD PRESSURE: 70 MMHG | HEART RATE: 91 BPM | WEIGHT: 146 LBS | HEIGHT: 72 IN | SYSTOLIC BLOOD PRESSURE: 120 MMHG | TEMPERATURE: 97.3 F

## 2019-03-18 DIAGNOSIS — Z00.00 HEALTH CARE MAINTENANCE: ICD-10-CM

## 2019-03-18 DIAGNOSIS — M54.50 LUMBOSACRAL PAIN: ICD-10-CM

## 2019-03-18 DIAGNOSIS — M41.9 SCOLIOSIS OF LUMBAR SPINE, UNSPECIFIED SCOLIOSIS TYPE: ICD-10-CM

## 2019-03-18 DIAGNOSIS — S32.030S CLOSED COMPRESSION FRACTURE OF THIRD LUMBAR VERTEBRA, SEQUELA: ICD-10-CM

## 2019-03-18 DIAGNOSIS — I69.959 HEMIPLEGIA OF DOMINANT SIDE AS LATE EFFECT FOLLOWING CEREBROVASCULAR DISEASE (HCC): ICD-10-CM

## 2019-03-18 PROCEDURE — 99214 OFFICE O/P EST MOD 30 MIN: CPT | Performed by: INTERNAL MEDICINE

## 2019-03-18 RX ORDER — MELOXICAM 15 MG/1
15 TABLET ORAL DAILY
Qty: 60 TAB | Refills: 0 | Status: SHIPPED | OUTPATIENT
Start: 2019-03-18 | End: 2019-04-17

## 2019-03-18 RX ORDER — CYCLOBENZAPRINE HCL 5 MG
5-10 TABLET ORAL 3 TIMES DAILY PRN
Qty: 60 TAB | Refills: 1 | Status: SHIPPED | OUTPATIENT
Start: 2019-03-18 | End: 2019-03-21

## 2019-03-18 ASSESSMENT — PAIN SCALES - GENERAL: PAINLEVEL: 6=MODERATE PAIN

## 2019-03-18 NOTE — PROGRESS NOTES
CHIEF COMPLAINT  Chief Complaint   Patient presents with   • Follow-Up     ER   Back pain    HPI  Patient is a 63 y.o. male patient, accompanied by his caregiver, who presents today for the following     Lower back pain, hemiplegia, closed fracture of L3, scoliosis  The patient is 63-year-old, male with a history of hemiplegia due to old CVA, closed fracture of L3, scoliosis, developed sudden lower back pain    - Onset: 10 days ago, sudden   -After getting up from bed  - Triger: No trauma   -He has history of old compression fracture of L3  - located in: lower back  - intensity:  mild to moderate; occasionally severe  - quality:  dull and sharp with activity  - radiation:  no  - alleviating factors are:  rest, muscular relaxant, Tylenol  -  exacerbating factors are:  activity  - accompanied:    - no numbness, weakness, tingling, fever, chills  - course: Improving  - imaging: X-ray lumbar spine  - treatment: as above    No reported history of:  - chronic immune suppression, alcoholism, IV drug abuse, indwelling catheter, diabetes.    - No history of recent spinal surgery or injection.    Denies:  - numbness/saddle anesthesia.  - bowel/bladder changes, fever.   - trauma  - nausea/vomiting  - chest pain, shortness of breath, abdominal pain.     He was seen in the ER on 3/12/19 note was reviewed with the following:  -Ordered x-ray of lumbar spine, showed old L3 compression fracture  -CTA chest was negative  -Given Tylenol and Flexeril, improved    Reviewed PMH, PSH, FH, SH, ALL, HCM/IMM, no changes  Reviewed MEDS, no changes    Patient Active Problem List    Diagnosis Date Noted   • Thrombocytopenia (HCC) 12/21/2018   • S/P BKA (below knee amputation) unilateral (HCC) 06/21/2017   • Physical deconditioning 03/15/2017   • Health care maintenance 09/15/2016   • H/O: CVA (cerebrovascular accident) 09/15/2016   • Wheelchair bound 03/18/2016   • Chronic anticoagulation 10/08/2013   • Dyslipidemia (high LDL; low HDL)  10/08/2012   • Cognitive deficits as late effect of cerebrovascular disease    • Hemiplegia of dominant side as late effect following cerebrovascular disease (HCC)      CURRENT MEDICATIONS  Current Outpatient Prescriptions   Medication Sig Dispense Refill   • cyclobenzaprine (FLEXERIL) 5 MG tablet Take 1-2 Tabs by mouth 3 times a day as needed for Moderate Pain or Muscle Spasms for up to 3 days. 60 Tab 1   • meloxicam (MOBIC) 15 MG tablet Take 1 Tab by mouth every day. 60 Tab 0   • warfarin (COUMADIN) 2.5 MG Tab TAKE ONE-HALF TO ONE TABLET BY MOUTH ONCE DAILY AS DIRECTED BY THE COUMADIN CLINIC 90 Tab 1   • lovastatin (MEVACOR) 10 MG tablet TAKE ONE TABLET BY MOUTH ONCE DAILY 90 Tab 3   • KLOR-CON M20 20 MEQ Tab CR TAKE 1 TABLET BY MOUTH ONCE DAILY 90 Tab 3   • fenofibrate (TRICOR) 48 MG Tab TAKE ONE TABLET BY MOUTH ONCE DAILY 90 Tab 3   • Cholecalciferol (VITAMIN D3) 2000 UNIT CAPS Take 1 Cap by mouth every day. 90 Cap 3     No current facility-administered medications for this visit.      ALLERGIES  Allergies: Patient has no known allergies.  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  SOCIAL HISTORY  Social History   Substance Use Topics   • Smoking status: Former Smoker     Quit date: 9/1/2001   • Smokeless tobacco: Never Used   • Alcohol use No      Comment: former abuser     Social History     Social History Narrative   • No narrative on  file     FAMILY HISTORY  Family History   Problem Relation Age of Onset   • Stroke Neg Hx    • Diabetes Neg Hx    • Cancer Neg Hx    • Heart Disease Neg Hx    • Hypertension Neg Hx    • Hyperlipidemia Neg Hx      No family status information on file.     ROS   Constitutional: Negative for fever, chills.  HENT: Negative for congestion, sore throat.  Eyes: Negative for blurred vision.   Respiratory: Negative for cough, shortness of breath.  Cardiovascular: Negative for chest pain, palpitations.   Gastrointestinal: Negative for heartburn, nausea, abdominal pain.   Genitourinary: Negative for dysuria.  Musculoskeletal: as above.  Skin: Negative for rash and itching.   Neuro: as above..   Endo/Heme/Allergies: Does not bruise/bleed easily.   Psychiatric/Behavioral: Negative for depression.    PHYSICAL EXAM   Blood pressure 120/70, pulse 91, temperature 36.3 °C (97.3 °F), temperature source Temporal, height 1.829 m (6'), weight 66.2 kg (146 lb), SpO2 95 %. Body mass index is 19.8 kg/m².  General:  NAD, well appearing  HEENT:   NC/AT, PERRLA, EOMI, TMs are clear. Oropharyngeal mucosa is pink,  without lesions;  no cervical / supraclavicular  lymphadenopathy, no thyromegaly.    Cardiovascular: RRR.   No m/r/g. No carotid bruits .      Lungs:   CTAB, no w/r/r, no respiratory distress.  Abdomen: Soft, NT/ND + BS; no suprapubic tenderness; no masses or hepatosplenomegaly.  Extremities:  2+ DP and radial pulses bilaterally.  No c/c/e.   Skin:  Warm, dry.  No erythema. No rash.   Neurologic: Alert & oriented x 3. CN II-XII grossly intact.  Right hemiplegia  Psychiatric:  Affect normal, mood normal, judgment normal.  MS: no TTP over spine/paraspinal muscles; muscle spasm.    LABS     Labs are reviewed and discussed with a patient  Lab Results   Component Value Date/Time    CHOLSTRLTOT 149 07/19/2018 07:12 AM    LDL 82 07/19/2018 07:12 AM    HDL 29 (A) 07/19/2018 07:12 AM    TRIGLYCERIDE 192 (H) 07/19/2018 07:12 AM       Lab  Results   Component Value Date/Time    SODIUM 143 03/12/2019 10:40 PM    POTASSIUM 3.8 03/12/2019 10:40 PM    CHLORIDE 110 03/12/2019 10:40 PM    CO2 24 03/12/2019 10:40 PM    GLUCOSE 109 (H) 03/12/2019 10:40 PM    BUN 26 (H) 03/12/2019 10:40 PM    CREATININE 0.87 03/12/2019 10:40 PM     Lab Results   Component Value Date/Time    ALKPHOSPHAT 48 07/19/2018 07:12 AM    ASTSGOT 29 07/19/2018 07:12 AM    ALTSGPT 32 07/19/2018 07:12 AM    TBILIRUBIN 1.2 07/19/2018 07:12 AM      Lab Results   Component Value Date/Time    HBA1C 5.1 05/14/2018 06:19 AM    HBA1C 4.9 06/13/2017 06:40 AM       Lab Results   Component Value Date/Time    WBC 6.5 03/12/2019 10:40 PM    RBC 4.63 (L) 03/12/2019 10:40 PM    HEMOGLOBIN 14.7 03/12/2019 10:40 PM    HEMATOCRIT 42.9 03/12/2019 10:40 PM    MCV 92.7 03/12/2019 10:40 PM    MCH 31.7 03/12/2019 10:40 PM    MCHC 34.3 03/12/2019 10:40 PM    MPV 9.6 03/12/2019 10:40 PM    NEUTSPOLYS 63.40 03/12/2019 10:40 PM    LYMPHOCYTES 26.80 03/12/2019 10:40 PM    MONOCYTES 7.20 03/12/2019 10:40 PM    EOSINOPHILS 1.80 03/12/2019 10:40 PM    BASOPHILS 0.60 03/12/2019 10:40 PM      IMAGING     Reviewed and discussed following imaging from 3/12/19:    Chest x-ray:  1.  Slight atelectasis or parenchymal scarring near left costophrenic angle.  2.  Otherwise unremarkable PA and lateral chest x-ray.    X-ray lumbar spine:  1.  Sigmoid scoliosis of lumbar spine.  2.  Wedge-shaped deformity of the L3 vertebral body consistent with compression fracture of indeterminate age. Recommend MRI scan of lumbar spine for further evaluation.    CTA chest, 3/13/19:  1.  No evidence of aortic aneurysm formation or aortic dissection.  2.  Moderate atherosclerotic changes of the infrarenal aorta and iliac arteries.  3. Right parapelvic renal cyst.    ASSESMENT AND PLAN        1. Lumbosacral pain  Refilled Flexeril, given meloxicam for the next 5 days.  Advised to continue activity as tolerated    - REFERRAL TO ORTHOPEDICS  -  REFERRAL TO PHYSICAL THERAPY Reason for Therapy: Eval/Treat/Report  - cyclobenzaprine (FLEXERIL) 5 MG tablet; Take 1-2 Tabs by mouth 3 times a day as needed for Moderate Pain or Muscle Spasms for up to 3 days.  Dispense: 60 Tab; Refill: 1  - meloxicam (MOBIC) 15 MG tablet; Take 1 Tab by mouth every day.  Dispense: 60 Tab; Refill: 0    2. Hemiplegia of dominant side as late effect following cerebrovascular disease (HCC)  Need yearly PT  - REFERRAL TO PHYSICAL THERAPY Reason for Therapy: Eval/Treat/Report    3. Closed compression fracture of third lumbar vertebra, sequela  Probably old, to be reviewed by orthopedics  - REFERRAL TO ORTHOPEDICS    4. Scoliosis of lumbar spine, unspecified scoliosis type  Need orthopedics review  - REFERRAL TO ORTHOPEDICS  - REFERRAL TO PHYSICAL THERAPY Reason for Therapy: Eval/Treat/Report    5. Health care maintenance  Needs shingles shot    Counseling:   - Smoking:  Nonsmoker    Followup: prn    All questions are answered.    Please note that this dictation was created using voice recognition software, and that there might be errors of wilberto and possibly content.

## 2019-04-11 ENCOUNTER — APPOINTMENT (OUTPATIENT)
Dept: PHYSICAL THERAPY | Facility: REHABILITATION | Age: 64
End: 2019-04-11
Attending: INTERNAL MEDICINE
Payer: MEDICARE

## 2019-04-17 ENCOUNTER — ANTICOAGULATION VISIT (OUTPATIENT)
Dept: VASCULAR LAB | Facility: MEDICAL CENTER | Age: 64
End: 2019-04-17
Attending: INTERNAL MEDICINE
Payer: MEDICARE

## 2019-04-17 VITALS — HEART RATE: 102 BPM | SYSTOLIC BLOOD PRESSURE: 134 MMHG | DIASTOLIC BLOOD PRESSURE: 99 MMHG

## 2019-04-17 DIAGNOSIS — Z79.01 CHRONIC ANTICOAGULATION: ICD-10-CM

## 2019-04-17 LAB — INR PPP: 3.2 (ref 2–3.5)

## 2019-04-17 PROCEDURE — 99212 OFFICE O/P EST SF 10 MIN: CPT | Performed by: NURSE PRACTITIONER

## 2019-04-17 PROCEDURE — 85610 PROTHROMBIN TIME: CPT

## 2019-04-17 RX ORDER — GABAPENTIN 100 MG/1
100 CAPSULE ORAL 3 TIMES DAILY
COMMUNITY
End: 2023-01-03 | Stop reason: SDUPTHER

## 2019-04-18 LAB — INR BLD: 3.2 (ref 0.9–1.2)

## 2019-04-22 ENCOUNTER — APPOINTMENT (OUTPATIENT)
Dept: PHYSICAL THERAPY | Facility: REHABILITATION | Age: 64
End: 2019-04-22
Payer: MEDICARE

## 2019-05-08 ENCOUNTER — APPOINTMENT (OUTPATIENT)
Dept: PHYSICAL THERAPY | Facility: REHABILITATION | Age: 64
End: 2019-05-08
Payer: MEDICARE

## 2019-05-14 ENCOUNTER — APPOINTMENT (OUTPATIENT)
Dept: PHYSICAL THERAPY | Facility: REHABILITATION | Age: 64
End: 2019-05-14
Payer: MEDICARE

## 2019-05-15 ENCOUNTER — ANTICOAGULATION VISIT (OUTPATIENT)
Dept: VASCULAR LAB | Facility: MEDICAL CENTER | Age: 64
End: 2019-05-15
Attending: INTERNAL MEDICINE
Payer: MEDICARE

## 2019-05-15 DIAGNOSIS — Z79.01 CHRONIC ANTICOAGULATION: ICD-10-CM

## 2019-05-15 LAB — INR PPP: 2.8 (ref 2–3.5)

## 2019-05-15 PROCEDURE — 99211 OFF/OP EST MAY X REQ PHY/QHP: CPT | Performed by: PHARMACIST

## 2019-05-15 PROCEDURE — 85610 PROTHROMBIN TIME: CPT

## 2019-05-15 NOTE — PROGRESS NOTES
Anticoagulation Summary  As of 5/15/2019    INR goal:   2.0-3.0   TTR:   71.6 % (3.9 y)   INR used for dosin.80 (5/15/2019)   Warfarin maintenance plan:   1.25 mg (2.5 mg x 0.5) every Wed; 2.5 mg (2.5 mg x 1) all other days   Weekly warfarin total:   16.25 mg   Plan last modified:   Gary Wall, PharmD (2015)   Next INR check:   2019   Target end date:   Indefinite    Indications    Chronic anticoagulation [Z79.01]  Deep vein thrombosis [453.40] (Resolved) [I82.409]  Stroke [434.91] (Resolved) [I63.9]  Deep vein thrombosis (DVT) (HCC) (Resolved) [I82.409]             Anticoagulation Episode Summary     INR check location:   [facility]    Preferred lab:       Send INR reminders to:       Comments:         Anticoagulation Care Providers     Provider Role Specialty Phone number    Jamari Platt M.D. Referring Family Medicine [phone]    [facility]   [phone]        Anticoagulation Patient Findings  Patient Findings     Negatives:   Signs/symptoms of thrombosis, Signs/symptoms of bleeding, Laboratory test error suspected, Change in health, Change in alcohol use, Change in activity, Upcoming invasive procedure, Emergency department visit, Upcoming dental procedure, Missed doses, Extra doses, Change in medications, Change in diet/appetite, Hospital admission, Bruising, Other complaints          HPI:  Drew Melton seen in clinic today, on anticoagulation therapy with warfarin for stroke and blood clot prevention due to hx of stroke and DVT.  Changes to current medical/health status since last appt: NONE  Denies signs/symptoms of bleeding and/or thrombosis since the last appt.    Denies any interval changes to diet  Denies any interval changes to medications since last appt.   Denies any complications or cost restrictions with current therapy.       A/P   INR  -therapeutic.   Pt is to continue with current warfarin dosing regimen.    Follow up appointment  in 12 week(s).    Elias Peña, PharmD

## 2019-05-16 ENCOUNTER — APPOINTMENT (OUTPATIENT)
Dept: PHYSICAL THERAPY | Facility: REHABILITATION | Age: 64
End: 2019-05-16
Payer: MEDICARE

## 2019-05-16 LAB — INR BLD: 2.8 (ref 0.9–1.2)

## 2019-05-21 ENCOUNTER — APPOINTMENT (OUTPATIENT)
Dept: PHYSICAL THERAPY | Facility: REHABILITATION | Age: 64
End: 2019-05-21
Payer: MEDICARE

## 2019-05-23 ENCOUNTER — APPOINTMENT (OUTPATIENT)
Dept: PHYSICAL THERAPY | Facility: REHABILITATION | Age: 64
End: 2019-05-23
Payer: MEDICARE

## 2019-05-28 ENCOUNTER — APPOINTMENT (OUTPATIENT)
Dept: PHYSICAL THERAPY | Facility: REHABILITATION | Age: 64
End: 2019-05-28
Payer: MEDICARE

## 2019-05-30 ENCOUNTER — APPOINTMENT (OUTPATIENT)
Dept: PHYSICAL THERAPY | Facility: REHABILITATION | Age: 64
End: 2019-05-30
Payer: MEDICARE

## 2019-06-18 ENCOUNTER — TELEPHONE (OUTPATIENT)
Dept: MEDICAL GROUP | Facility: MEDICAL CENTER | Age: 64
End: 2019-06-18

## 2019-06-19 ENCOUNTER — HOSPITAL ENCOUNTER (OUTPATIENT)
Dept: LAB | Facility: MEDICAL CENTER | Age: 64
End: 2019-06-19
Attending: INTERNAL MEDICINE
Payer: MEDICARE

## 2019-06-19 DIAGNOSIS — D69.6 THROMBOCYTOPENIA (HCC): ICD-10-CM

## 2019-06-19 DIAGNOSIS — E78.5 DYSLIPIDEMIA (HIGH LDL; LOW HDL): Chronic | ICD-10-CM

## 2019-06-19 LAB
ALBUMIN SERPL BCP-MCNC: 4.7 G/DL (ref 3.2–4.9)
ALBUMIN/GLOB SERPL: 1.2 G/DL
ALP SERPL-CCNC: 62 U/L (ref 30–99)
ALT SERPL-CCNC: 21 U/L (ref 2–50)
ANION GAP SERPL CALC-SCNC: 11 MMOL/L (ref 0–11.9)
AST SERPL-CCNC: 26 U/L (ref 12–45)
BASOPHILS # BLD AUTO: 0.6 % (ref 0–1.8)
BASOPHILS # BLD: 0.03 K/UL (ref 0–0.12)
BILIRUB SERPL-MCNC: 0.8 MG/DL (ref 0.1–1.5)
BUN SERPL-MCNC: 31 MG/DL (ref 8–22)
CALCIUM SERPL-MCNC: 10.5 MG/DL (ref 8.5–10.5)
CHLORIDE SERPL-SCNC: 103 MMOL/L (ref 96–112)
CHOLEST SERPL-MCNC: 186 MG/DL (ref 100–199)
CO2 SERPL-SCNC: 26 MMOL/L (ref 20–33)
CREAT SERPL-MCNC: 0.93 MG/DL (ref 0.5–1.4)
EOSINOPHIL # BLD AUTO: 0.14 K/UL (ref 0–0.51)
EOSINOPHIL NFR BLD: 2.8 % (ref 0–6.9)
ERYTHROCYTE [DISTWIDTH] IN BLOOD BY AUTOMATED COUNT: 49.1 FL (ref 35.9–50)
FASTING STATUS PATIENT QL REPORTED: NORMAL
GLOBULIN SER CALC-MCNC: 3.8 G/DL (ref 1.9–3.5)
GLUCOSE SERPL-MCNC: 79 MG/DL (ref 65–99)
HCT VFR BLD AUTO: 45.9 % (ref 42–52)
HDLC SERPL-MCNC: 33 MG/DL
HGB BLD-MCNC: 14 G/DL (ref 14–18)
IMM GRANULOCYTES # BLD AUTO: 0.01 K/UL (ref 0–0.11)
IMM GRANULOCYTES NFR BLD AUTO: 0.2 % (ref 0–0.9)
LDLC SERPL CALC-MCNC: 124 MG/DL
LYMPHOCYTES # BLD AUTO: 1.39 K/UL (ref 1–4.8)
LYMPHOCYTES NFR BLD: 28 % (ref 22–41)
MCH RBC QN AUTO: 29.1 PG (ref 27–33)
MCHC RBC AUTO-ENTMCNC: 30.5 G/DL (ref 33.7–35.3)
MCV RBC AUTO: 95.4 FL (ref 81.4–97.8)
MONOCYTES # BLD AUTO: 0.38 K/UL (ref 0–0.85)
MONOCYTES NFR BLD AUTO: 7.6 % (ref 0–13.4)
NEUTROPHILS # BLD AUTO: 3.02 K/UL (ref 1.82–7.42)
NEUTROPHILS NFR BLD: 60.8 % (ref 44–72)
NRBC # BLD AUTO: 0 K/UL
NRBC BLD-RTO: 0 /100 WBC
PLATELET # BLD AUTO: 270 K/UL (ref 164–446)
PMV BLD AUTO: 9.3 FL (ref 9–12.9)
POTASSIUM SERPL-SCNC: 3.7 MMOL/L (ref 3.6–5.5)
PROT SERPL-MCNC: 8.5 G/DL (ref 6–8.2)
RBC # BLD AUTO: 4.81 M/UL (ref 4.7–6.1)
SODIUM SERPL-SCNC: 140 MMOL/L (ref 135–145)
TRIGL SERPL-MCNC: 143 MG/DL (ref 0–149)
WBC # BLD AUTO: 5 K/UL (ref 4.8–10.8)

## 2019-06-19 PROCEDURE — 80053 COMPREHEN METABOLIC PANEL: CPT

## 2019-06-19 PROCEDURE — 85025 COMPLETE CBC W/AUTO DIFF WBC: CPT

## 2019-06-19 PROCEDURE — 36415 COLL VENOUS BLD VENIPUNCTURE: CPT

## 2019-06-19 PROCEDURE — 80061 LIPID PANEL: CPT

## 2019-06-26 ENCOUNTER — APPOINTMENT (OUTPATIENT)
Dept: MEDICAL GROUP | Facility: MEDICAL CENTER | Age: 64
End: 2019-06-26
Payer: MEDICARE

## 2019-07-25 ENCOUNTER — OFFICE VISIT (OUTPATIENT)
Dept: MEDICAL GROUP | Facility: MEDICAL CENTER | Age: 64
End: 2019-07-25
Payer: MEDICARE

## 2019-07-25 VITALS
DIASTOLIC BLOOD PRESSURE: 74 MMHG | HEART RATE: 81 BPM | SYSTOLIC BLOOD PRESSURE: 122 MMHG | TEMPERATURE: 99.4 F | OXYGEN SATURATION: 95 %

## 2019-07-25 DIAGNOSIS — Z00.00 MEDICARE ANNUAL WELLNESS VISIT, SUBSEQUENT: ICD-10-CM

## 2019-07-25 DIAGNOSIS — I69.959 HEMIPLEGIA OF DOMINANT SIDE AS LATE EFFECT FOLLOWING CEREBROVASCULAR DISEASE (HCC): ICD-10-CM

## 2019-07-25 DIAGNOSIS — Z89.519 S/P BKA (BELOW KNEE AMPUTATION) UNILATERAL (HCC): ICD-10-CM

## 2019-07-25 DIAGNOSIS — Z99.3 WHEELCHAIR BOUND: ICD-10-CM

## 2019-07-25 DIAGNOSIS — Z11.59 NEED FOR HEPATITIS C SCREENING TEST: ICD-10-CM

## 2019-07-25 DIAGNOSIS — Z00.00 HEALTH CARE MAINTENANCE: ICD-10-CM

## 2019-07-25 DIAGNOSIS — I69.919 COGNITIVE DEFICITS AS LATE EFFECT OF CEREBROVASCULAR DISEASE: ICD-10-CM

## 2019-07-25 DIAGNOSIS — Z86.73 H/O: CVA (CEREBROVASCULAR ACCIDENT): ICD-10-CM

## 2019-07-25 DIAGNOSIS — D69.6 THROMBOCYTOPENIA (HCC): ICD-10-CM

## 2019-07-25 DIAGNOSIS — E78.5 DYSLIPIDEMIA (HIGH LDL; LOW HDL): Chronic | ICD-10-CM

## 2019-07-25 DIAGNOSIS — R53.81 PHYSICAL DECONDITIONING: ICD-10-CM

## 2019-07-25 DIAGNOSIS — Z79.01 CHRONIC ANTICOAGULATION: ICD-10-CM

## 2019-07-25 PROCEDURE — G0439 PPPS, SUBSEQ VISIT: HCPCS | Performed by: INTERNAL MEDICINE

## 2019-07-25 RX ORDER — ATORVASTATIN CALCIUM 20 MG/1
20 TABLET, FILM COATED ORAL DAILY
Qty: 90 TAB | Refills: 1 | Status: SHIPPED | OUTPATIENT
Start: 2019-07-25 | End: 2019-12-07 | Stop reason: SDUPTHER

## 2019-07-25 RX ORDER — FENOFIBRATE 48 MG/1
TABLET, COATED ORAL
Qty: 90 TAB | Refills: 3 | Status: SHIPPED | OUTPATIENT
Start: 2019-07-25 | End: 2020-08-24

## 2019-07-25 ASSESSMENT — PATIENT HEALTH QUESTIONNAIRE - PHQ9: CLINICAL INTERPRETATION OF PHQ2 SCORE: 0

## 2019-07-25 ASSESSMENT — ACTIVITIES OF DAILY LIVING (ADL)
TELEPHONE_COMMENTS: SPEECH IMPAIRMENT
BATHING_REQUIRES_ASSISTANCE: 0

## 2019-07-25 ASSESSMENT — ENCOUNTER SYMPTOMS: GENERAL WELL-BEING: GOOD

## 2019-07-25 NOTE — PROGRESS NOTES
Chief Complaint   Patient presents with   • Annual Wellness Visit     HPI:  Drew is a 63 y.o. here for Medicare Annual Wellness Visit    Patient Active Problem List    Diagnosis Date Noted   • Thrombocytopenia (HCC) 12/21/2018   • S/P BKA (below knee amputation) unilateral (HCC) 06/21/2017   • Physical deconditioning 03/15/2017   • Health care maintenance 09/15/2016   • H/O: CVA (cerebrovascular accident) 09/15/2016   • Wheelchair bound 03/18/2016   • Chronic anticoagulation 10/08/2013   • Dyslipidemia (high LDL; low HDL) 10/08/2012   • Cognitive deficits as late effect of cerebrovascular disease    • Hemiplegia of dominant side as late effect following cerebrovascular disease (HCC)      Current Outpatient Prescriptions   Medication Sig Dispense Refill   • Kpuzv-Syyhvapx-Uvcus-RsHp-Zn-C ( IMMUNE SUPPORT COMPLEX PO) Take  by mouth.     • Multiple Vitamins-Minerals (CENTRUM SILVER 50+MEN) Tab Take  by mouth.     • warfarin (COUMADIN) 2.5 MG Tab TAKE ONE-HALF TO ONE TABLET BY MOUTH ONCE DAILY AS DIRECTED BY THE COUMADIN CLINIC 90 Tab 1   • lovastatin (MEVACOR) 10 MG tablet TAKE ONE TABLET BY MOUTH ONCE DAILY 90 Tab 3   • KLOR-CON M20 20 MEQ Tab CR TAKE 1 TABLET BY MOUTH ONCE DAILY 90 Tab 3   • fenofibrate (TRICOR) 48 MG Tab TAKE ONE TABLET BY MOUTH ONCE DAILY 90 Tab 3   • Cholecalciferol (VITAMIN D3) 2000 UNIT CAPS Take 1 Cap by mouth every day. 90 Cap 3   • gabapentin (NEURONTIN) 100 MG Cap Take 100 mg by mouth 3 times a day.       No current facility-administered medications for this visit.       Patient is taking medications as noted in medication list.  Current supplements as per medication list.     Allergies: Patient has no known allergies.    Current social contact/activities: caretaker reports having friends coming to visit     Is patient current with immunizations? No, due for SHINGRIX (Shingles). Patient is interested in receiving NONE today. Vaccine is on backorder.    He  reports that he quit smoking  about 17 years ago. He has never used smokeless tobacco. He reports that he does not drink alcohol or use drugs.  Counseling given: Yes    DPA/Advanced directive: Patient has Advanced Directive on file.     ROS:    Gait: Uses a wheelchair   Ostomy: No   Other tubes: No   Amputations: Yes Chronic oxygen use No   Last eye exam caretaker reports about 2 years ago   Wears hearing aids: No   : Denies any urinary leakage during the last 6 months    Depression Screening  Little interest or pleasure in doing things?  0 - not at all  Feeling down, depressed, or hopeless? 0 - not at all  Patient Health Questionnaire Score: 0    Screening for Cognitive Impairment  Three Minute Recall (village, kitchen, baby)  1/3    Yariel clock face with all 12 numbers and set the hands to show 10 past 10.  No Pt could tell the time on the clock, cannot draw  If cognitive concerns identified, deferred for follow up unless specifically addressed in assessment and plan.    Fall Risk Assessment  Has the patient had two or more falls in the last year or any fall with injury in the last year?  No  If fall risk identified, deferred for follow up unless specifically addressed in assessment and plan.    Safety Assessment  Throw rugs on floor.  Yes  Handrails on all stairs.  Yes  Good lighting in all hallways.  Yes  Difficulty hearing.  No  Patient counseled about all safety risks that were identified.    Functional Assessment ADLs  Are there any barriers preventing you from cooking for yourself or meeting nutritional needs?  No. Pt is able to use some kitchen equipment  Are there any barriers preventing you from driving safely or obtaining transportation?  Yes. Is in wheelchair  Are there any barriers preventing you from using a telephone or calling for help?  Yes. Speech impairment  Are there any barriers preventing you from shopping?  No. Goes with caretaker  Are there any barriers preventing you from taking care of your own finances?  Yes.  "Caretaker does this  Are there any barriers preventing you from managing your medications?  Yes. Caretaker does this  Are there any barriers preventing you from showering, bathing or dressing yourself?  No.    Are you currently engaging in any exercise or physical activity?  No.  Pt is in wheelchair, takes \"walkes\" with caretaker  What is your perception of your health?  Good.    Health Maintenance Summary                HEPATITIS C SCREENING Overdue 1955     IMM ZOSTER VACCINES Overdue 12/20/2006     Annual Wellness Visit Overdue 7/24/2019      Done 7/23/2018      Patient has more history with this topic...    IMM INFLUENZA Next Due 9/1/2019      Done 10/30/2018 Imm Admin: Influenza Vaccine Quad Inj (Pf)     Patient has more history with this topic...    IMM DTaP/Tdap/Td Vaccine Next Due 4/8/2023      Done 4/8/2013 Imm Admin: Tdap Vaccine    COLONOSCOPY Next Due 1/24/2028      Done 1/24/2018 Surg:COLONOSCOPY     Patient has more history with this topic...        Patient Care Team:  Jamari Platt M.D. as PCP - General (Family Medicine)  Ange Carrillo MA, CCC-SLP (Inactive) as Speech Therapy (Speech Therapy)  DIMITRIS NicolasNESTEVAN. as Mid Level Provider (Family Medicine)    Social History   Substance Use Topics   • Smoking status: Former Smoker     Quit date: 9/1/2001   • Smokeless tobacco: Never Used   • Alcohol use No      Comment: former abuser     Family History   Problem Relation Age of Onset   • Stroke Neg Hx    • Diabetes Neg Hx    • Cancer Neg Hx    • Heart Disease Neg Hx    • Hypertension Neg Hx    • Hyperlipidemia Neg Hx      He  has a past medical history of Cataract; Cognitive deficits, late effect of cerebrovascular disease; Cold (01/01/2018); Hemiplegia affecting dominant side, late effect of cerebrovascular disease; High cholesterol; 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   Past Surgical History:   Procedure Laterality Date   • COLONOSCOPY  1/24/2018    Procedure: COLONOSCOPY;  Surgeon: Lenin Danielle M.D.;  Location: SURGERY [facility];  Service: Gastroenterology   • AMPUTATION, BELOW THE KNEE Right 2001   • OTHER      cataract IOLI     Exam:   /74 (BP Location: Left arm, Patient Position: Sitting, BP Cuff Size: Adult)   Pulse 81   Temp 37.4 °C (99.4 °F) (Temporal)   SpO2 95%  There is no height or weight on file to calculate BMI.   No HT/WT in a wheelchair.  Hearing fair.    Dentition poor  Alert, oriented in no acute distress.  Eye contact is good, speech goal directed, affect calm    Labs  Reviewed and discussed  Lab Results   Component Value Date/Time    CHOLSTRLTOT 186 06/19/2019 06:09 AM     (H) 06/19/2019 06:09 AM    HDL 33 (A) 06/19/2019 06:09 AM    TRIGLYCERIDE 143 06/19/2019 06:09 AM       Lab Results   Component Value Date/Time    SODIUM 140 06/19/2019 06:09 AM    POTASSIUM 3.7 06/19/2019 06:09 AM    CHLORIDE 103 06/19/2019 06:09 AM    CO2 26 06/19/2019 06:09 AM    GLUCOSE 79 06/19/2019 06:09 AM    BUN 31 (H) 06/19/2019 06:09 AM    CREATININE 0.93 06/19/2019 06:09 AM     Lab Results   Component Value Date/Time    ALKPHOSPHAT 62 06/19/2019 06:09 AM    ASTSGOT 26 06/19/2019 06:09 AM    ALTSGPT 21 06/19/2019 06:09 AM    TBILIRUBIN 0.8 06/19/2019 06:09 AM      Lab Results   Component Value Date/Time    HBA1C 5.1 05/14/2018 06:19 AM    HBA1C 4.9 06/13/2017 06:40 AM     No results found for: TSH  No results found for: FREET4  Lab Results   Component Value Date/Time    WBC 5.0 06/19/2019 06:09 AM    RBC 4.81 06/19/2019 06:09 AM    HEMOGLOBIN 14.0 06/19/2019 06:09 AM    HEMATOCRIT 45.9 06/19/2019 06:09 AM    MCV 95.4 06/19/2019 06:09 AM    MCH 29.1 06/19/2019 06:09 AM    MCHC 30.5 (L) 06/19/2019 06:09 AM    MPV 9.3 06/19/2019 06:09 AM    NEUTSPOLYS 60.80 06/19/2019 06:09 AM    LYMPHOCYTES 28.00 06/19/2019 06:09 AM    MONOCYTES 7.60  06/19/2019 06:09 AM    EOSINOPHILS 2.80 06/19/2019 06:09 AM    BASOPHILS 0.60 06/19/2019 06:09 AM      Assessment and Plan    1. Medicare annual wellness visit, subsequent  Reviewed PMH, PSH, FH, SH, ALL, MEDS, HCM/IMM.   - Subsequent Annual Wellness Visit - Includes PPPS ()    2. Health care maintenance  Per HPI  - Subsequent Annual Wellness Visit - Includes PPPS ()    3. Need for hepatitis C screening test  - HEP C VIRUS ANTIBODY; Standing  - Subsequent Annual Wellness Visit - Includes PPPS ()  - HEP C VIRUS ANTIBODY    4. Dyslipidemia (high LDL; low HDL)  Controlled, continue with current rx  - Subsequent Annual Wellness Visit - Includes PPPS ()  - atorvastatin (LIPITOR) 20 MG Tab; Take 1 Tab by mouth every day.  Dispense: 90 Tab; Refill: 1  - fenofibrate (TRICOR) 48 MG Tab; TAKE ONE TABLET BY MOUTH ONCE DAILY  Dispense: 90 Tab; Refill: 3  -Lipid panel, Future  -CMP, Future    5. Thrombocytopenia (HCC)  RESOLVED  - Subsequent Annual Wellness Visit - Includes PPPS ()    6. H/O: CVA (cerebrovascular accident)    H/o CVA in 9/2001, with subsequent cognitive deficits, hemiplegia, on chronic anticoagulation.   He also have physical deconditioning, wheelchair bound and status post BKA.   He just finished PT/OT/speech therapy. He has been stable.  On statin and Coumadin  Blood pressure is controlled.    - Subsequent Annual Wellness Visit - Includes PPPS ()  7. S/P BKA (below knee amputation) unilateral (HCC)  - Subsequent Annual Wellness Visit - Includes PPPS ()  8. Hemiplegia of dominant side as late effect following cerebrovascular disease (HCC)  - Subsequent Annual Wellness Visit - Includes PPPS ()  9. Cognitive deficits as late effect of cerebrovascular disease  - Subsequent Annual Wellness Visit - Includes PPPS ()  10. Chronic anticoagulation  - Subsequent Annual Wellness Visit - Includes PPPS ()  11. Wheelchair bound  - Subsequent Annual Wellness Visit - Includes  PPPS ()  12. Physical deconditioning  - Subsequent Annual Wellness Visit - Includes PPPS ()    Services suggested: No services needed at this time  Health Care Screening recommendations as per orders if indicated.  Referrals offered: PT/OT/Nutrition counseling/Behavioral Health/Smoking cessation as per orders if indicated.    Discussion today about general wellness and lifestyle habits:    · Prevent falls and reduce trip hazards; Cautioned about securing or removing rugs.  · Have a working fire alarm and carbon monoxide detector;   · Engage in regular physical activity and social activities       Follow-up: in 6 months with labs

## 2019-08-07 ENCOUNTER — ANTICOAGULATION VISIT (OUTPATIENT)
Dept: VASCULAR LAB | Facility: MEDICAL CENTER | Age: 64
End: 2019-08-07
Attending: INTERNAL MEDICINE
Payer: MEDICARE

## 2019-08-07 DIAGNOSIS — Z79.01 CHRONIC ANTICOAGULATION: ICD-10-CM

## 2019-08-07 LAB
INR BLD: 3 (ref 0.9–1.2)
INR PPP: 3 (ref 2–3.5)

## 2019-08-07 PROCEDURE — 85610 PROTHROMBIN TIME: CPT

## 2019-08-07 PROCEDURE — 99211 OFF/OP EST MAY X REQ PHY/QHP: CPT | Performed by: PHARMACIST

## 2019-08-07 RX ORDER — WARFARIN SODIUM 2.5 MG/1
TABLET ORAL
Qty: 90 TAB | Refills: 1 | Status: SHIPPED | OUTPATIENT
Start: 2019-08-07 | End: 2020-02-20

## 2019-08-07 NOTE — PROGRESS NOTES
Anticoagulation Summary  As of 8/7/2019    INR goal:   2.0-3.0   TTR:   73.2 % (4.1 y)   INR used for dosing:   3.00 (8/7/2019)   Warfarin maintenance plan:   1.25 mg (2.5 mg x 0.5) every Wed; 2.5 mg (2.5 mg x 1) all other days   Weekly warfarin total:   16.25 mg   Plan last modified:   Gary Wall, PharmD (9/30/2015)   Next INR check:   10/30/2019   Target end date:   Indefinite    Indications    Chronic anticoagulation [Z79.01]  Deep vein thrombosis [453.40] (Resolved) [I82.409]  Stroke [434.91] (Resolved) [I63.9]  Deep vein thrombosis (DVT) (HCC) (Resolved) [I82.409]             Anticoagulation Episode Summary     INR check location:   [facility] Clinic    Preferred lab:       Send INR reminders to:       Comments:         Anticoagulation Care Providers     Provider Role Specialty Phone number    Jamari Platt M.D. Referring Family Medicine [phone]    [facility]   [phone]          Anticoagulation Patient Findings  Patient Findings     Negatives:   Signs/symptoms of thrombosis, Signs/symptoms of bleeding, Laboratory test error suspected, Change in health, Change in alcohol use, Change in activity, Upcoming invasive procedure, Emergency department visit, Upcoming dental procedure, Missed doses, Extra doses, Change in medications, Change in diet/appetite, Hospital admission, Bruising, Other complaints          HPI:  Drew Melton seen in clinic today, on anticoagulation therapy with warfarin due to hx of DVT and stroke.  Changes to current medical/health status since last appt: NONE  Denies signs/symptoms of bleeding and/or thrombosis since the last appt.    Denies any interval changes to diet  Denies any interval changes to medications since last appt.   Denies any complications or cost restrictions with current therapy.       A/P   INR  remains -therapeutic at 3.0.   Pt is to continue with current warfarin dosing regimen.    Follow up appointment in 12  week(s).    Elias Peña, PharmD

## 2019-09-17 DIAGNOSIS — E87.6 HYPOKALEMIA: ICD-10-CM

## 2019-09-17 RX ORDER — POTASSIUM CHLORIDE 20 MEQ/1
TABLET, EXTENDED RELEASE ORAL
Qty: 90 TAB | Refills: 3 | Status: SHIPPED | OUTPATIENT
Start: 2019-09-17 | End: 2020-08-24

## 2019-10-30 ENCOUNTER — ANTICOAGULATION VISIT (OUTPATIENT)
Dept: VASCULAR LAB | Facility: MEDICAL CENTER | Age: 64
End: 2019-10-30
Attending: INTERNAL MEDICINE
Payer: MEDICARE

## 2019-10-30 DIAGNOSIS — Z79.01 CHRONIC ANTICOAGULATION: ICD-10-CM

## 2019-10-30 LAB
INR BLD: 2 (ref 0.9–1.2)
INR PPP: 2 (ref 2–3.5)

## 2019-10-30 PROCEDURE — 85610 PROTHROMBIN TIME: CPT

## 2019-10-30 PROCEDURE — 99211 OFF/OP EST MAY X REQ PHY/QHP: CPT

## 2019-10-30 NOTE — PROGRESS NOTES
Anticoagulation Summary  As of 10/30/2019    INR goal:   2.0-3.0   TTR:   74.6 % (4.4 y)   INR used for dosin.00 (10/30/2019)   Warfarin maintenance plan:   1.25 mg (2.5 mg x 0.5) every Wed; 2.5 mg (2.5 mg x 1) all other days   Weekly warfarin total:   16.25 mg   Plan last modified:   Gary Wall PharmD (2015)   Next INR check:   2020   Target end date:   Indefinite    Indications    Chronic anticoagulation [Z79.01]  Deep vein thrombosis [453.40] (Resolved) [I82.409]  Stroke [434.91] (Resolved) [I63.9]  Deep vein thrombosis (DVT) (HCC) (Resolved) [I82.409]             Anticoagulation Episode Summary     INR check location:   Anticoagulation Clinic    Preferred lab:       Send INR reminders to:       Comments:         Anticoagulation Care Providers     Provider Role Specialty Phone number    Jamari Platt M.D. Referring Family Medicine [phone]    [facility] Anticoagulation Services   [phone]        Anticoagulation Patient Findings      HPI:  Drew Melton seen in clinic today, on anticoagulation therapy with warfarin for DVT.   Changes to current medical/health status since last appt: none  Denies signs/symptoms of bleeding and/or thrombosis since the last appt.    Denies any interval changes to diet  Denies any interval changes to medications since last appt.   Denies any complications or cost restrictions with current therapy. .  Confirmed dosing regimen.     A/P   INR  therapeutic.   Confirmed dosing regimen.     Follow up appointment in 12 week(s).    Markus Salazar, LyndseyD

## 2019-12-07 DIAGNOSIS — E78.5 DYSLIPIDEMIA (HIGH LDL; LOW HDL): Chronic | ICD-10-CM

## 2019-12-09 RX ORDER — ATORVASTATIN CALCIUM 20 MG/1
TABLET, FILM COATED ORAL
Qty: 90 TAB | Refills: 1 | Status: SHIPPED | OUTPATIENT
Start: 2019-12-09 | End: 2020-07-13

## 2020-01-10 DIAGNOSIS — Z79.01 CHRONIC ANTICOAGULATION: ICD-10-CM

## 2020-01-22 ENCOUNTER — ANTICOAGULATION VISIT (OUTPATIENT)
Dept: VASCULAR LAB | Facility: MEDICAL CENTER | Age: 65
End: 2020-01-22
Attending: INTERNAL MEDICINE
Payer: MEDICARE

## 2020-01-22 DIAGNOSIS — Z79.01 CHRONIC ANTICOAGULATION: ICD-10-CM

## 2020-01-22 LAB
INR BLD: 1.9 (ref 0.9–1.2)
INR PPP: 1.9 (ref 2–3.5)

## 2020-01-22 PROCEDURE — 85610 PROTHROMBIN TIME: CPT

## 2020-01-22 PROCEDURE — 99211 OFF/OP EST MAY X REQ PHY/QHP: CPT

## 2020-01-22 NOTE — PROGRESS NOTES
Anticoagulation Summary  As of 2020    INR goal:   2.0-3.0   TTR:   70.9 % (4.6 y)   INR used for dosin.90! (2020)   Warfarin maintenance plan:   1.25 mg (2.5 mg x 0.5) every Wed; 2.5 mg (2.5 mg x 1) all other days   Weekly warfarin total:   16.25 mg   Plan last modified:   Gary Wall, PharmD (2015)   Next INR check:   3/4/2020   Target end date:   Indefinite    Indications    Chronic anticoagulation [Z79.01]  Deep vein thrombosis [453.40] (Resolved) [I82.409]  Stroke [434.91] (Resolved) [I63.9]  Deep vein thrombosis (DVT) (HCC) (Resolved) [I82.409]             Anticoagulation Episode Summary     INR check location:   Anticoagulation Clinic    Preferred lab:       Send INR reminders to:       Comments:         Anticoagulation Care Providers     Provider Role Specialty Phone number    Jamari Platt M.D. Referring Family Medicine [phone]    [facility] Anticoagulation Services   [phone]        Anticoagulation Patient Findings    HPI:  Drew Melton seen in clinic today, on anticoagulation therapy with warfarin for DVT/Stroke.  Changes to current medical/health status since last appt: none  Denies signs/symptoms of bleeding and/or thrombosis since the last appt.    Denies any interval changes to diet  Denies any interval changes to medications since last appt.   Denies any complications or cost restrictions with current therapy.   BP denied vitals  Confirmed dosing regimen.    A/P   INR  SUB-therapeutic by 0.1.     Pt has been on current regimen and therapeutic in the past. Will continue current regimen.    Follow up appointment in 6 week(s).    Markus Salazar, LyndseyD

## 2020-01-30 ENCOUNTER — OFFICE VISIT (OUTPATIENT)
Dept: MEDICAL GROUP | Facility: MEDICAL CENTER | Age: 65
End: 2020-01-30
Payer: MEDICARE

## 2020-01-30 VITALS
TEMPERATURE: 97.3 F | HEART RATE: 89 BPM | OXYGEN SATURATION: 94 % | SYSTOLIC BLOOD PRESSURE: 126 MMHG | BODY MASS INDEX: 21.8 KG/M2 | HEIGHT: 72 IN | DIASTOLIC BLOOD PRESSURE: 72 MMHG | WEIGHT: 160.94 LBS

## 2020-01-30 DIAGNOSIS — I69.959 HEMIPLEGIA OF DOMINANT SIDE AS LATE EFFECT FOLLOWING CEREBROVASCULAR DISEASE (HCC): ICD-10-CM

## 2020-01-30 DIAGNOSIS — Z79.01 CHRONIC ANTICOAGULATION: ICD-10-CM

## 2020-01-30 DIAGNOSIS — Z23 NEED FOR VACCINATION: ICD-10-CM

## 2020-01-30 DIAGNOSIS — Z12.5 SCREENING FOR MALIGNANT NEOPLASM OF PROSTATE: ICD-10-CM

## 2020-01-30 DIAGNOSIS — Z86.73 H/O: CVA (CEREBROVASCULAR ACCIDENT): ICD-10-CM

## 2020-01-30 DIAGNOSIS — Z99.3 WHEELCHAIR BOUND: ICD-10-CM

## 2020-01-30 DIAGNOSIS — I69.919 COGNITIVE DEFICITS AS LATE EFFECT OF CEREBROVASCULAR DISEASE: ICD-10-CM

## 2020-01-30 DIAGNOSIS — E78.5 DYSLIPIDEMIA (HIGH LDL; LOW HDL): Chronic | ICD-10-CM

## 2020-01-30 DIAGNOSIS — D69.6 THROMBOCYTOPENIA (HCC): ICD-10-CM

## 2020-01-30 DIAGNOSIS — Z11.59 NEED FOR HEPATITIS C SCREENING TEST: ICD-10-CM

## 2020-01-30 DIAGNOSIS — Z89.519 S/P BKA (BELOW KNEE AMPUTATION) UNILATERAL (HCC): ICD-10-CM

## 2020-01-30 DIAGNOSIS — R53.83 FATIGUE, UNSPECIFIED TYPE: ICD-10-CM

## 2020-01-30 DIAGNOSIS — E55.9 HYPOVITAMINOSIS D: ICD-10-CM

## 2020-01-30 PROCEDURE — 99214 OFFICE O/P EST MOD 30 MIN: CPT | Performed by: INTERNAL MEDICINE

## 2020-01-30 ASSESSMENT — PATIENT HEALTH QUESTIONNAIRE - PHQ9: CLINICAL INTERPRETATION OF PHQ2 SCORE: 0
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
## 2020-02-20 RX ORDER — WARFARIN SODIUM 2.5 MG/1
TABLET ORAL
Qty: 90 TAB | Refills: 1 | Status: SHIPPED | OUTPATIENT
Start: 2020-02-20 | End: 2020-08-24

## 2020-03-04 ENCOUNTER — ANTICOAGULATION VISIT (OUTPATIENT)
Dept: VASCULAR LAB | Facility: MEDICAL CENTER | Age: 65
End: 2020-03-04
Attending: INTERNAL MEDICINE
Payer: MEDICARE

## 2020-03-04 DIAGNOSIS — Z79.01 CHRONIC ANTICOAGULATION: ICD-10-CM

## 2020-03-04 LAB
INR BLD: 1.9 (ref 0.9–1.2)
INR PPP: 1.9 (ref 2–3.5)

## 2020-03-04 PROCEDURE — 99212 OFFICE O/P EST SF 10 MIN: CPT | Performed by: PHARMACIST

## 2020-03-04 PROCEDURE — 85610 PROTHROMBIN TIME: CPT

## 2020-03-04 NOTE — PROGRESS NOTES
Anticoagulation Summary  As of 3/4/2020    INR goal:   2.0-3.0   TTR:   69.2 % (4.7 y)   INR used for dosin.90! (3/4/2020)   Warfarin maintenance plan:   1.25 mg (2.5 mg x 0.5) every Wed; 2.5 mg (2.5 mg x 1) all other days   Weekly warfarin total:   16.25 mg   Plan last modified:   Gary Wall, PharmD (2015)   Next INR check:   4/15/2020   Target end date:   Indefinite    Indications    Chronic anticoagulation [Z79.01]  Deep vein thrombosis [453.40] (Resolved) [I82.409]  Stroke [434.91] (Resolved) [I63.9]  Deep vein thrombosis (DVT) (HCC) (Resolved) [I82.409]             Anticoagulation Episode Summary     INR check location:   Anticoagulation Clinic    Preferred lab:       Send INR reminders to:       Comments:         Anticoagulation Care Providers     Provider Role Specialty Phone number    Jamari Platt M.D. Referring Family Medicine [phone]    [facility] Anticoagulation Services   [phone]        Anticoagulation Patient Findings  Patient Findings     Negatives:   Signs/symptoms of thrombosis, Signs/symptoms of bleeding, Laboratory test error suspected, Change in health, Change in alcohol use, Change in activity, Upcoming invasive procedure, Emergency department visit, Upcoming dental procedure, Missed doses, Extra doses, Change in medications, Change in diet/appetite, Hospital admission, Bruising, Other complaints          HPI:  Drew Melton seen in clinic today, on anticoagulation therapy with warfarin due to hx of DVT and stroke.  Changes to current medical/health status since last appt: NONE  Denies signs/symptoms of bleeding and/or thrombosis since the last appt.    Denies any interval changes to diet  Denies any interval changes to medications since last appt.   Denies any complications or cost restrictions with current therapy.   Verified current warfarin dosing schedule.       A/P   INR  slightly sub-therapeutic at 1.9.   Instructed patient to bolus with 2.5mg X 1,  then resume current warfarin regimen.    Follow up appointment in 6 week(s) per patient preference.    Elias Peña, PharmD

## 2020-04-15 ENCOUNTER — ANTICOAGULATION VISIT (OUTPATIENT)
Dept: VASCULAR LAB | Facility: MEDICAL CENTER | Age: 65
End: 2020-04-15
Attending: INTERNAL MEDICINE
Payer: MEDICARE

## 2020-04-15 DIAGNOSIS — Z79.01 CHRONIC ANTICOAGULATION: ICD-10-CM

## 2020-04-15 LAB — INR PPP: 2.4 (ref 2–3.5)

## 2020-04-15 PROCEDURE — 85610 PROTHROMBIN TIME: CPT

## 2020-04-15 PROCEDURE — 99211 OFF/OP EST MAY X REQ PHY/QHP: CPT | Performed by: NURSE PRACTITIONER

## 2020-04-15 NOTE — PROGRESS NOTES
Anticoagulation Summary  As of 4/15/2020    INR goal:   2.0-3.0   TTR:   69.4 % (4.8 y)   INR used for dosin.40 (4/15/2020)   Warfarin maintenance plan:   1.25 mg (2.5 mg x 0.5) every Wed; 2.5 mg (2.5 mg x 1) all other days   Weekly warfarin total:   16.25 mg   Plan last modified:   Gary Wall, PharmD (2015)   Next INR check:   6/10/2020   Target end date:   Indefinite    Indications    Chronic anticoagulation [Z79.01]  Deep vein thrombosis [453.40] (Resolved) [I82.409]  Stroke [434.91] (Resolved) [I63.9]  Deep vein thrombosis (DVT) (HCC) (Resolved) [I82.409]             Anticoagulation Episode Summary     INR check location:   Anticoagulation Clinic    Preferred lab:       Send INR reminders to:       Comments:         Anticoagulation Care Providers     Provider Role Specialty Phone number    Jamari Platt M.D. Referring Family Medicine [phone]    [facility] Anticoagulation Services   [phone]        Anticoagulation Patient Findings      HPI:  Drew Melton seen in clinic today for follow up on anticoagulation therapy in the presence of DVT, CVA hx.   Denies any changes to current medical/health status since last appointment.   Denies any medication or diet changes.   No current symptoms of bleeding or thrombosis reported.    A/P:   INR therapeutic.   Continue current regimen.     Follow up appointment in 8 week(s) per pt's preference.    Next Appointment: Wednesday, Tabby 10, 2020 at 11:00 am.    Khushboo FREED

## 2020-06-10 ENCOUNTER — ANTICOAGULATION VISIT (OUTPATIENT)
Dept: VASCULAR LAB | Facility: MEDICAL CENTER | Age: 65
End: 2020-06-10
Attending: INTERNAL MEDICINE
Payer: MEDICARE

## 2020-06-10 DIAGNOSIS — Z79.01 CHRONIC ANTICOAGULATION: ICD-10-CM

## 2020-06-10 LAB — INR PPP: 2.3 (ref 2–3.5)

## 2020-06-10 PROCEDURE — 99211 OFF/OP EST MAY X REQ PHY/QHP: CPT | Performed by: NURSE PRACTITIONER

## 2020-06-10 PROCEDURE — 85610 PROTHROMBIN TIME: CPT

## 2020-06-10 NOTE — PROGRESS NOTES
Anticoagulation Summary  As of 6/10/2020    INR goal:   2.0-3.0   TTR:   70.4 % (5 y)   INR used for dosin.30 (6/10/2020)   Warfarin maintenance plan:   1.25 mg (2.5 mg x 0.5) every Wed; 2.5 mg (2.5 mg x 1) all other days   Weekly warfarin total:   16.25 mg   Plan last modified:   Gary Wall, PharmD (2015)   Next INR check:   2020   Target end date:   Indefinite    Indications    Chronic anticoagulation [Z79.01]  Deep vein thrombosis [453.40] (Resolved) [I82.409]  Stroke [434.91] (Resolved) [I63.9]  Deep vein thrombosis (DVT) (HCC) (Resolved) [I82.409]             Anticoagulation Episode Summary     INR check location:   Anticoagulation Clinic    Preferred lab:       Send INR reminders to:       Comments:         Anticoagulation Care Providers     Provider Role Specialty Phone number    Jamari Platt M.D. Referring Family Medicine [phone]    [facility] Anticoagulation Services   [phone]        Anticoagulation Patient Findings      HPI:  Drew Melton seen in clinic today for follow up on anticoagulation therapy in the presence of DVT, CVA hx.   Denies any changes to current medical/health status since last appointment.   Denies any medication or diet changes.   No current symptoms of bleeding or thrombosis reported.    A/P:   INR therapeutic.   Continue current regimen.     Follow up appointment in 12 week(s) per pt's request due to concerns about COVID-19 (went 8 weeks prior).    Next Appointment:  at 11:00 am.    Khushboo FREED

## 2020-07-11 DIAGNOSIS — E78.5 DYSLIPIDEMIA (HIGH LDL; LOW HDL): Chronic | ICD-10-CM

## 2020-07-13 RX ORDER — ATORVASTATIN CALCIUM 20 MG/1
TABLET, FILM COATED ORAL
Qty: 90 TAB | Refills: 0 | Status: SHIPPED | OUTPATIENT
Start: 2020-07-13 | End: 2020-10-05

## 2020-07-23 ENCOUNTER — HOSPITAL ENCOUNTER (OUTPATIENT)
Dept: LAB | Facility: MEDICAL CENTER | Age: 65
End: 2020-07-23
Attending: INTERNAL MEDICINE
Payer: MEDICARE

## 2020-07-23 DIAGNOSIS — E78.5 DYSLIPIDEMIA (HIGH LDL; LOW HDL): Chronic | ICD-10-CM

## 2020-07-23 DIAGNOSIS — R53.83 FATIGUE, UNSPECIFIED TYPE: ICD-10-CM

## 2020-07-23 DIAGNOSIS — Z12.5 SCREENING FOR MALIGNANT NEOPLASM OF PROSTATE: ICD-10-CM

## 2020-07-23 DIAGNOSIS — E55.9 HYPOVITAMINOSIS D: ICD-10-CM

## 2020-07-23 DIAGNOSIS — D69.6 THROMBOCYTOPENIA (HCC): ICD-10-CM

## 2020-07-23 DIAGNOSIS — Z11.59 NEED FOR HEPATITIS C SCREENING TEST: ICD-10-CM

## 2020-07-23 LAB
25(OH)D3 SERPL-MCNC: 59 NG/ML (ref 30–100)
ALBUMIN SERPL BCP-MCNC: 4.6 G/DL (ref 3.2–4.9)
ALBUMIN/GLOB SERPL: 1.5 G/DL
ALP SERPL-CCNC: 63 U/L (ref 30–99)
ALT SERPL-CCNC: 26 U/L (ref 2–50)
ANION GAP SERPL CALC-SCNC: 7 MMOL/L (ref 7–16)
AST SERPL-CCNC: 22 U/L (ref 12–45)
BASOPHILS # BLD AUTO: 0.6 % (ref 0–1.8)
BASOPHILS # BLD: 0.03 K/UL (ref 0–0.12)
BILIRUB SERPL-MCNC: 0.9 MG/DL (ref 0.1–1.5)
BUN SERPL-MCNC: 23 MG/DL (ref 8–22)
CALCIUM SERPL-MCNC: 10.3 MG/DL (ref 8.5–10.5)
CHLORIDE SERPL-SCNC: 103 MMOL/L (ref 96–112)
CHOLEST SERPL-MCNC: 113 MG/DL (ref 100–199)
CO2 SERPL-SCNC: 29 MMOL/L (ref 20–33)
CREAT SERPL-MCNC: 0.74 MG/DL (ref 0.5–1.4)
EOSINOPHIL # BLD AUTO: 0.19 K/UL (ref 0–0.51)
EOSINOPHIL NFR BLD: 3.9 % (ref 0–6.9)
ERYTHROCYTE [DISTWIDTH] IN BLOOD BY AUTOMATED COUNT: 48.5 FL (ref 35.9–50)
FASTING STATUS PATIENT QL REPORTED: NORMAL
GLOBULIN SER CALC-MCNC: 3 G/DL (ref 1.9–3.5)
GLUCOSE SERPL-MCNC: 102 MG/DL (ref 65–99)
HCT VFR BLD AUTO: 45.5 % (ref 42–52)
HCV AB SER QL: NORMAL
HDLC SERPL-MCNC: 33 MG/DL
HGB BLD-MCNC: 15.1 G/DL (ref 14–18)
IMM GRANULOCYTES # BLD AUTO: 0.01 K/UL (ref 0–0.11)
IMM GRANULOCYTES NFR BLD AUTO: 0.2 % (ref 0–0.9)
LDLC SERPL CALC-MCNC: 56 MG/DL
LYMPHOCYTES # BLD AUTO: 1.71 K/UL (ref 1–4.8)
LYMPHOCYTES NFR BLD: 35.3 % (ref 22–41)
MCH RBC QN AUTO: 31.5 PG (ref 27–33)
MCHC RBC AUTO-ENTMCNC: 33.2 G/DL (ref 33.7–35.3)
MCV RBC AUTO: 94.8 FL (ref 81.4–97.8)
MONOCYTES # BLD AUTO: 0.43 K/UL (ref 0–0.85)
MONOCYTES NFR BLD AUTO: 8.9 % (ref 0–13.4)
NEUTROPHILS # BLD AUTO: 2.48 K/UL (ref 1.82–7.42)
NEUTROPHILS NFR BLD: 51.1 % (ref 44–72)
NRBC # BLD AUTO: 0 K/UL
NRBC BLD-RTO: 0 /100 WBC
PLATELET # BLD AUTO: 181 K/UL (ref 164–446)
PMV BLD AUTO: 9.7 FL (ref 9–12.9)
POTASSIUM SERPL-SCNC: 3.9 MMOL/L (ref 3.6–5.5)
PROT SERPL-MCNC: 7.6 G/DL (ref 6–8.2)
PSA SERPL-MCNC: 0.29 NG/ML (ref 0–4)
RBC # BLD AUTO: 4.8 M/UL (ref 4.7–6.1)
SODIUM SERPL-SCNC: 139 MMOL/L (ref 135–145)
TRIGL SERPL-MCNC: 118 MG/DL (ref 0–149)
TSH SERPL DL<=0.005 MIU/L-ACNC: 4.08 UIU/ML (ref 0.38–5.33)
WBC # BLD AUTO: 4.9 K/UL (ref 4.8–10.8)

## 2020-07-23 PROCEDURE — 85025 COMPLETE CBC W/AUTO DIFF WBC: CPT

## 2020-07-23 PROCEDURE — 80061 LIPID PANEL: CPT

## 2020-07-23 PROCEDURE — 82306 VITAMIN D 25 HYDROXY: CPT

## 2020-07-23 PROCEDURE — 84153 ASSAY OF PSA TOTAL: CPT | Mod: GA

## 2020-07-23 PROCEDURE — 36415 COLL VENOUS BLD VENIPUNCTURE: CPT | Mod: GA

## 2020-07-23 PROCEDURE — G0472 HEP C SCREEN HIGH RISK/OTHER: HCPCS | Mod: GA

## 2020-07-23 PROCEDURE — 84443 ASSAY THYROID STIM HORMONE: CPT

## 2020-07-23 PROCEDURE — 80053 COMPREHEN METABOLIC PANEL: CPT

## 2020-07-30 ENCOUNTER — OFFICE VISIT (OUTPATIENT)
Dept: MEDICAL GROUP | Age: 65
End: 2020-07-30
Payer: MEDICARE

## 2020-07-30 VITALS
DIASTOLIC BLOOD PRESSURE: 68 MMHG | HEIGHT: 72 IN | BODY MASS INDEX: 18.42 KG/M2 | HEART RATE: 74 BPM | OXYGEN SATURATION: 95 % | SYSTOLIC BLOOD PRESSURE: 126 MMHG | TEMPERATURE: 98.7 F | WEIGHT: 136 LBS

## 2020-07-30 DIAGNOSIS — I69.959 HEMIPLEGIA OF DOMINANT SIDE AS LATE EFFECT FOLLOWING CEREBROVASCULAR DISEASE (HCC): ICD-10-CM

## 2020-07-30 DIAGNOSIS — I69.919 COGNITIVE DEFICITS AS LATE EFFECT OF CEREBROVASCULAR DISEASE: ICD-10-CM

## 2020-07-30 DIAGNOSIS — Z99.3 WHEELCHAIR DEPENDENCE: ICD-10-CM

## 2020-07-30 DIAGNOSIS — Z79.01 CHRONIC ANTICOAGULATION: ICD-10-CM

## 2020-07-30 DIAGNOSIS — R53.81 PHYSICAL DECONDITIONING: ICD-10-CM

## 2020-07-30 DIAGNOSIS — B35.1 TOENAIL FUNGUS: ICD-10-CM

## 2020-07-30 DIAGNOSIS — Z00.00 HEALTH CARE MAINTENANCE: ICD-10-CM

## 2020-07-30 DIAGNOSIS — Z00.00 MEDICARE ANNUAL WELLNESS VISIT, SUBSEQUENT: ICD-10-CM

## 2020-07-30 DIAGNOSIS — D69.6 THROMBOCYTOPENIA (HCC): ICD-10-CM

## 2020-07-30 DIAGNOSIS — E78.5 DYSLIPIDEMIA (HIGH LDL; LOW HDL): Chronic | ICD-10-CM

## 2020-07-30 DIAGNOSIS — E55.9 HYPOVITAMINOSIS D: ICD-10-CM

## 2020-07-30 DIAGNOSIS — Z86.73 H/O: CVA (CEREBROVASCULAR ACCIDENT): ICD-10-CM

## 2020-07-30 DIAGNOSIS — K64.9 HEMORRHOIDS, UNSPECIFIED HEMORRHOID TYPE: ICD-10-CM

## 2020-07-30 DIAGNOSIS — Z89.519 S/P BKA (BELOW KNEE AMPUTATION) UNILATERAL (HCC): ICD-10-CM

## 2020-07-30 PROCEDURE — 99214 OFFICE O/P EST MOD 30 MIN: CPT | Mod: 25 | Performed by: INTERNAL MEDICINE

## 2020-07-30 PROCEDURE — G0439 PPPS, SUBSEQ VISIT: HCPCS | Performed by: INTERNAL MEDICINE

## 2020-07-30 ASSESSMENT — ACTIVITIES OF DAILY LIVING (ADL): BATHING_REQUIRES_ASSISTANCE: 0

## 2020-07-30 ASSESSMENT — FIBROSIS 4 INDEX: FIB4 SCORE: 1.5

## 2020-07-30 ASSESSMENT — PATIENT HEALTH QUESTIONNAIRE - PHQ9: CLINICAL INTERPRETATION OF PHQ2 SCORE: 0

## 2020-07-30 ASSESSMENT — ENCOUNTER SYMPTOMS: GENERAL WELL-BEING: GOOD
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
## 2020-08-22 DIAGNOSIS — E87.6 HYPOKALEMIA: ICD-10-CM

## 2020-08-22 DIAGNOSIS — E78.5 DYSLIPIDEMIA (HIGH LDL; LOW HDL): Chronic | ICD-10-CM

## 2020-08-24 RX ORDER — WARFARIN SODIUM 2.5 MG/1
TABLET ORAL
Qty: 90 TAB | Refills: 1 | Status: SHIPPED | OUTPATIENT
Start: 2020-08-24 | End: 2021-02-11 | Stop reason: SDUPTHER

## 2020-08-24 RX ORDER — POTASSIUM CHLORIDE 20 MEQ/1
TABLET, EXTENDED RELEASE ORAL
Qty: 90 TAB | Refills: 1 | Status: SHIPPED | OUTPATIENT
Start: 2020-08-24 | End: 2021-02-11 | Stop reason: SDUPTHER

## 2020-08-24 RX ORDER — FENOFIBRATE 48 MG/1
TABLET, COATED ORAL
Qty: 90 TAB | Refills: 1 | Status: SHIPPED | OUTPATIENT
Start: 2020-08-24 | End: 2021-02-11 | Stop reason: SDUPTHER

## 2020-09-02 ENCOUNTER — ANTICOAGULATION VISIT (OUTPATIENT)
Dept: VASCULAR LAB | Facility: MEDICAL CENTER | Age: 65
End: 2020-09-02
Attending: INTERNAL MEDICINE
Payer: MEDICARE

## 2020-09-02 DIAGNOSIS — Z79.01 CHRONIC ANTICOAGULATION: ICD-10-CM

## 2020-09-02 LAB — INR PPP: 2 (ref 2–3.5)

## 2020-09-02 PROCEDURE — 85610 PROTHROMBIN TIME: CPT

## 2020-09-02 PROCEDURE — 99211 OFF/OP EST MAY X REQ PHY/QHP: CPT | Performed by: NURSE PRACTITIONER

## 2020-09-02 NOTE — PROGRESS NOTES
Anticoagulation Summary  As of 2020    INR goal:  2.0-3.0   TTR:  71.7 % (5.2 y)   INR used for dosin.00 (2020)   Warfarin maintenance plan:  1.25 mg (2.5 mg x 0.5) every Wed; 2.5 mg (2.5 mg x 1) all other days   Weekly warfarin total:  16.25 mg   Plan last modified:  Gary Wall, PharmD (2015)   Next INR check:  12/3/2020   Target end date:  Indefinite    Indications    Chronic anticoagulation [Z79.01]  Deep vein thrombosis [453.40] (Resolved) [I82.409]  Stroke [434.91] (Resolved) [I63.9]  Deep vein thrombosis (DVT) (HCC) (Resolved) [I82.409]             Anticoagulation Episode Summary     INR check location:  Anticoagulation Clinic    Preferred lab:      Send INR reminders to:      Comments:        Anticoagulation Care Providers     Provider Role Specialty Phone number    Jamari Platt M.D. Referring Family Medicine [phone]    [facility]   [phone]        Anticoagulation Patient Findings      HPI:  rDew Melton seen in clinic today for follow up on anticoagulation therapy in the presence of DVT, CVA hx.   Denies any changes to current medical/health status since last appointment.   Denies any medication or diet changes.   No current symptoms of bleeding or thrombosis reported.    A/P:   INR therapeutic.   Continue current regimen.   BP declined.    Follow up appointment in 12 week(s).    Next Appointment: Thursday, Dec 3, 2020 at 11:00 am.    Khushboo FREED

## 2020-09-03 LAB — INR BLD: 2 (ref 0.9–1.2)

## 2020-10-02 DIAGNOSIS — E78.5 DYSLIPIDEMIA (HIGH LDL; LOW HDL): Chronic | ICD-10-CM

## 2020-10-05 RX ORDER — ATORVASTATIN CALCIUM 20 MG/1
TABLET, FILM COATED ORAL
Qty: 90 TAB | Refills: 3 | Status: SHIPPED | OUTPATIENT
Start: 2020-10-05 | End: 2021-02-11 | Stop reason: SDUPTHER

## 2020-12-03 ENCOUNTER — ANTICOAGULATION VISIT (OUTPATIENT)
Dept: VASCULAR LAB | Facility: MEDICAL CENTER | Age: 65
End: 2020-12-03
Attending: INTERNAL MEDICINE
Payer: MEDICARE

## 2020-12-03 DIAGNOSIS — Z79.01 CHRONIC ANTICOAGULATION: ICD-10-CM

## 2020-12-03 LAB
INR BLD: 2.3 (ref 0.9–1.2)
INR PPP: 2.3 (ref 2–3.5)

## 2020-12-03 PROCEDURE — 99211 OFF/OP EST MAY X REQ PHY/QHP: CPT | Performed by: NURSE PRACTITIONER

## 2020-12-03 PROCEDURE — 85610 PROTHROMBIN TIME: CPT

## 2020-12-03 NOTE — PROGRESS NOTES
Anticoagulation Summary  As of 12/3/2020    INR goal:  2.0-3.0   TTR:  73.0 % (5.5 y)   INR used for dosin.30 (12/3/2020)   Warfarin maintenance plan:  1.25 mg (2.5 mg x 0.5) every Wed; 2.5 mg (2.5 mg x 1) all other days   Weekly warfarin total:  16.25 mg   Plan last modified:  Gary Wall, PharmD (2015)   Next INR check:  2021   Target end date:  Indefinite    Indications    Chronic anticoagulation [Z79.01]  Deep vein thrombosis [453.40] (Resolved) [I82.409]  Stroke [434.91] (Resolved) [I63.9]  Deep vein thrombosis (DVT) (HCC) (Resolved) [I82.409]             Anticoagulation Episode Summary     INR check location:  Anticoagulation Clinic    Preferred lab:      Send INR reminders to:      Comments:        Anticoagulation Care Providers     Provider Role Specialty Phone number    Jamari Platt M.D. Referring Family Medicine [phone]    [facility]   [phone]        Anticoagulation Patient Findings      HPI:  Drew Melton seen in clinic today for follow up on anticoagulation therapy in the presence of DVT, CVA hx.   Denies any changes to current medical/health status since last appointment.   Denies any medication or diet changes.   No current symptoms of bleeding or thrombosis reported.    A/P:   INR therapeutic.   Continue current regimen.   BP declined.    Follow up appointment in 12 week(s).    Next Appointment:  at 11:00 am.    Khushboo FREED

## 2021-01-08 DIAGNOSIS — Z79.01 CHRONIC ANTICOAGULATION: ICD-10-CM

## 2021-01-14 ENCOUNTER — HOSPITAL ENCOUNTER (OUTPATIENT)
Dept: LAB | Facility: MEDICAL CENTER | Age: 66
End: 2021-01-14
Attending: INTERNAL MEDICINE
Payer: MEDICARE

## 2021-01-14 DIAGNOSIS — D69.6 THROMBOCYTOPENIA (HCC): ICD-10-CM

## 2021-01-14 DIAGNOSIS — E78.5 DYSLIPIDEMIA (HIGH LDL; LOW HDL): Chronic | ICD-10-CM

## 2021-01-14 DIAGNOSIS — E55.9 HYPOVITAMINOSIS D: ICD-10-CM

## 2021-01-14 LAB
25(OH)D3 SERPL-MCNC: 58 NG/ML (ref 30–100)
ALBUMIN SERPL BCP-MCNC: 4.3 G/DL (ref 3.2–4.9)
ALBUMIN/GLOB SERPL: 1.6 G/DL
ALP SERPL-CCNC: 51 U/L (ref 30–99)
ALT SERPL-CCNC: 27 U/L (ref 2–50)
ANION GAP SERPL CALC-SCNC: 7 MMOL/L (ref 7–16)
AST SERPL-CCNC: 26 U/L (ref 12–45)
BASOPHILS # BLD AUTO: 0.7 % (ref 0–1.8)
BASOPHILS # BLD: 0.04 K/UL (ref 0–0.12)
BILIRUB SERPL-MCNC: 1.1 MG/DL (ref 0.1–1.5)
BUN SERPL-MCNC: 19 MG/DL (ref 8–22)
CALCIUM SERPL-MCNC: 9.7 MG/DL (ref 8.5–10.5)
CHLORIDE SERPL-SCNC: 108 MMOL/L (ref 96–112)
CHOLEST SERPL-MCNC: 104 MG/DL (ref 100–199)
CO2 SERPL-SCNC: 26 MMOL/L (ref 20–33)
CREAT SERPL-MCNC: 0.84 MG/DL (ref 0.5–1.4)
EOSINOPHIL # BLD AUTO: 0.18 K/UL (ref 0–0.51)
EOSINOPHIL NFR BLD: 3.2 % (ref 0–6.9)
ERYTHROCYTE [DISTWIDTH] IN BLOOD BY AUTOMATED COUNT: 47.7 FL (ref 35.9–50)
GLOBULIN SER CALC-MCNC: 2.7 G/DL (ref 1.9–3.5)
GLUCOSE SERPL-MCNC: 73 MG/DL (ref 65–99)
HCT VFR BLD AUTO: 42 % (ref 42–52)
HDLC SERPL-MCNC: 27 MG/DL
HGB BLD-MCNC: 14 G/DL (ref 14–18)
IMM GRANULOCYTES # BLD AUTO: 0.01 K/UL (ref 0–0.11)
IMM GRANULOCYTES NFR BLD AUTO: 0.2 % (ref 0–0.9)
LDLC SERPL CALC-MCNC: 53 MG/DL
LYMPHOCYTES # BLD AUTO: 2.25 K/UL (ref 1–4.8)
LYMPHOCYTES NFR BLD: 40.5 % (ref 22–41)
MCH RBC QN AUTO: 31.5 PG (ref 27–33)
MCHC RBC AUTO-ENTMCNC: 33.3 G/DL (ref 33.7–35.3)
MCV RBC AUTO: 94.4 FL (ref 81.4–97.8)
MONOCYTES # BLD AUTO: 0.48 K/UL (ref 0–0.85)
MONOCYTES NFR BLD AUTO: 8.6 % (ref 0–13.4)
NEUTROPHILS # BLD AUTO: 2.6 K/UL (ref 1.82–7.42)
NEUTROPHILS NFR BLD: 46.8 % (ref 44–72)
NRBC # BLD AUTO: 0 K/UL
NRBC BLD-RTO: 0 /100 WBC
PLATELET # BLD AUTO: 168 K/UL (ref 164–446)
PMV BLD AUTO: 10.1 FL (ref 9–12.9)
POTASSIUM SERPL-SCNC: 4 MMOL/L (ref 3.6–5.5)
PROT SERPL-MCNC: 7 G/DL (ref 6–8.2)
RBC # BLD AUTO: 4.45 M/UL (ref 4.7–6.1)
SODIUM SERPL-SCNC: 141 MMOL/L (ref 135–145)
TRIGL SERPL-MCNC: 121 MG/DL (ref 0–149)
WBC # BLD AUTO: 5.6 K/UL (ref 4.8–10.8)

## 2021-01-14 PROCEDURE — 80053 COMPREHEN METABOLIC PANEL: CPT

## 2021-01-14 PROCEDURE — 36415 COLL VENOUS BLD VENIPUNCTURE: CPT

## 2021-01-14 PROCEDURE — 82306 VITAMIN D 25 HYDROXY: CPT

## 2021-01-14 PROCEDURE — 85025 COMPLETE CBC W/AUTO DIFF WBC: CPT

## 2021-01-14 PROCEDURE — 80061 LIPID PANEL: CPT

## 2021-01-26 ENCOUNTER — TELEPHONE (OUTPATIENT)
Dept: MEDICAL GROUP | Age: 66
End: 2021-01-26

## 2021-01-26 NOTE — TELEPHONE ENCOUNTER
ESTABLISHED PATIENT PRE-VISIT PLANNING     01/26/21 Called patient. No answer. Left voice message. Advised Office Visit scheduled Wednesday, 01/27/21@10:00 AM. Dr. Jeremy Reina. Offered Virtual Visit. Advised need to activate Telemedicine Clinic and down load Zoom oracio.    Patient was contacted to complete PVP.     Note: Patient will not be contacted if there is no indication to call.     1.  Reviewed notes from the last few office visits within the medical group: Yes    2.  If any orders were placed at last visit or intended to be done for this visit (i.e. 6 mos follow-up), do we have Results/Consult Notes?         •  Labs - Labs ordered, completed on 01/14/21 and results are in chart.  Note: If patient appointment is for lab review and patient did not complete labs, check with provider if OK to reschedule patient until labs completed.       •  Imaging - Imaging was not ordered at last office visit.       •  Referrals - Referral ordered, patient has NOT been seen. Patient has upcoming appointment with Vascular Medicine on 02/25/21. Called office of [facility] DPM-Podiatry. Per their office representative patient isn't their system.    3. Is this appointment scheduled as a Hospital Follow-Up? No    4.  Immunizations were updated in Epic using Reconcile Outside Information activity? Yes    5.  Patient is due for the following Health Maintenance Topics:   Health Maintenance Due   Topic Date Due   • IMM ZOSTER VACCINES (1 of 2) 12/20/2006   • IMM INFLUENZA (1) 09/01/2020       6.  AHA (Pulse8) form printed for Provider? N/A

## 2021-01-27 ENCOUNTER — APPOINTMENT (OUTPATIENT)
Dept: MEDICAL GROUP | Age: 66
End: 2021-01-27
Payer: MEDICARE

## 2021-02-10 ENCOUNTER — TELEPHONE (OUTPATIENT)
Dept: MEDICAL GROUP | Age: 66
End: 2021-02-10

## 2021-02-10 NOTE — TELEPHONE ENCOUNTER
ESTABLISHED PATIENT PRE-VISIT PLANNING     02/10/21 Called & spoke to patient's friend Eris Estrada who is listed in patient's contacts under demographic tab with permissions to discuss treatment and billing. Advised Trevor is inactive & offered Virtual Visit-declined. Advised office visit scheduled Thursday, 2/11/21@11:30AM -[address] [place] [address]. Labs done.    Patient was contacted to complete PVP.     Note: Patient will not be contacted if there is no indication to call.     1.  Reviewed notes from the last few office visits within the medical group: Yes    2.  If any orders were placed at last visit or intended to be done for this visit (i.e. 6 mos follow-up), do we have Results/Consult Notes?         •  Labs - Labs ordered, completed on 01/14/21 and results are in chart.  Note: If patient appointment is for lab review and patient did not complete labs, check with provider if OK to reschedule patient until labs completed.       •  Imaging - Imaging was not ordered at last office visit.       •  Referrals - Referral ordered, patient has NOT been seen. Pt has upcoming appointment with Vascular Medicine on 02/25/21 @11:00AM.    3. Is this appointment scheduled as a Hospital Follow-Up? No    4.  Immunizations were updated in Epic using Reconcile Outside Information activity? Yes    5.  Patient is due for the following Health Maintenance Topics:   Health Maintenance Due   Topic Date Due   • IMM ZOSTER VACCINES (1 of 2) 12/20/2006   • IMM INFLUENZA (1) 09/01/2020       6.  AHA (Pulse8) form printed for Provider? N/A

## 2021-02-11 ENCOUNTER — OFFICE VISIT (OUTPATIENT)
Dept: MEDICAL GROUP | Age: 66
End: 2021-02-11
Payer: MEDICARE

## 2021-02-11 VITALS
BODY MASS INDEX: 21.67 KG/M2 | HEIGHT: 72 IN | WEIGHT: 160 LBS | OXYGEN SATURATION: 95 % | HEART RATE: 72 BPM | SYSTOLIC BLOOD PRESSURE: 120 MMHG | TEMPERATURE: 98.1 F | DIASTOLIC BLOOD PRESSURE: 70 MMHG

## 2021-02-11 DIAGNOSIS — E78.5 DYSLIPIDEMIA (HIGH LDL; LOW HDL): Chronic | ICD-10-CM

## 2021-02-11 DIAGNOSIS — E55.9 HYPOVITAMINOSIS D: ICD-10-CM

## 2021-02-11 DIAGNOSIS — Z86.73 H/O: CVA (CEREBROVASCULAR ACCIDENT): ICD-10-CM

## 2021-02-11 DIAGNOSIS — I69.959 HEMIPLEGIA OF DOMINANT SIDE AS LATE EFFECT FOLLOWING CEREBROVASCULAR DISEASE (HCC): ICD-10-CM

## 2021-02-11 DIAGNOSIS — D64.9 ANEMIA, UNSPECIFIED TYPE: ICD-10-CM

## 2021-02-11 DIAGNOSIS — Z79.01 CHRONIC ANTICOAGULATION: ICD-10-CM

## 2021-02-11 DIAGNOSIS — E87.6 HYPOKALEMIA: ICD-10-CM

## 2021-02-11 DIAGNOSIS — Z89.519 S/P BKA (BELOW KNEE AMPUTATION) UNILATERAL (HCC): ICD-10-CM

## 2021-02-11 PROCEDURE — 99214 OFFICE O/P EST MOD 30 MIN: CPT | Performed by: INTERNAL MEDICINE

## 2021-02-11 RX ORDER — FENOFIBRATE 48 MG/1
TABLET, COATED ORAL
Qty: 90 TABLET | Refills: 4 | Status: SHIPPED | OUTPATIENT
Start: 2021-02-11 | End: 2022-05-15

## 2021-02-11 RX ORDER — WARFARIN SODIUM 2.5 MG/1
TABLET ORAL
Qty: 90 TABLET | Refills: 4 | Status: SHIPPED | OUTPATIENT
Start: 2021-02-11 | End: 2021-11-04 | Stop reason: SDUPTHER

## 2021-02-11 RX ORDER — POTASSIUM CHLORIDE 20 MEQ/1
TABLET, EXTENDED RELEASE ORAL
Qty: 90 TABLET | Refills: 4 | Status: SHIPPED | OUTPATIENT
Start: 2021-02-11 | End: 2022-02-25

## 2021-02-11 RX ORDER — ATORVASTATIN CALCIUM 20 MG/1
TABLET, FILM COATED ORAL
Qty: 90 TABLET | Refills: 4 | Status: SHIPPED | OUTPATIENT
Start: 2021-02-11 | End: 2021-10-02

## 2021-02-11 ASSESSMENT — FIBROSIS 4 INDEX: FIB4 SCORE: 1.91

## 2021-02-11 ASSESSMENT — PATIENT HEALTH QUESTIONNAIRE - PHQ9: CLINICAL INTERPRETATION OF PHQ2 SCORE: 0
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
## 2021-02-25 ENCOUNTER — ANTICOAGULATION VISIT (OUTPATIENT)
Dept: VASCULAR LAB | Facility: MEDICAL CENTER | Age: 66
End: 2021-02-25
Attending: INTERNAL MEDICINE
Payer: MEDICARE

## 2021-02-25 DIAGNOSIS — Z79.01 CHRONIC ANTICOAGULATION: ICD-10-CM

## 2021-02-25 LAB
INR BLD: 1.7 (ref 0.9–1.2)
INR PPP: 1.7 (ref 2–3.5)

## 2021-02-25 PROCEDURE — 85610 PROTHROMBIN TIME: CPT

## 2021-02-25 PROCEDURE — 99212 OFFICE O/P EST SF 10 MIN: CPT | Performed by: NURSE PRACTITIONER

## 2021-02-25 NOTE — PROGRESS NOTES
Anticoagulation Summary  As of 2021    INR goal:  2.0-3.0   TTR:  72.0 % (5.7 y)   INR used for dosin.70 (2021)   Warfarin maintenance plan:  1.25 mg (2.5 mg x 0.5) every Wed; 2.5 mg (2.5 mg x 1) all other days   Weekly warfarin total:  16.25 mg   Plan last modified:  Gary Wall, PharmD (2015)   Next INR check:  2021   Target end date:  Indefinite    Indications    Chronic anticoagulation [Z79.01]  Deep vein thrombosis [453.40] (Resolved) [I82.409]  Stroke [434.91] (Resolved) [I63.9]  Deep vein thrombosis (DVT) (HCC) (Resolved) [I82.409]             Anticoagulation Episode Summary     INR check location:  Anticoagulation Clinic    Preferred lab:      Send INR reminders to:      Comments:        Anticoagulation Care Providers     Provider Role Specialty Phone number    Jamari Platt M.D. Referring [facility] Medicine [phone]    [facility] Anticoagulation Services   [phone]        Anticoagulation Patient Findings      HPI:  Drew Melton seen in clinic today for follow up on anticoagulation therapy in the presence of DVT, CVA hx.   Denies any changes to current medical/health status since last appointment.   May have eaten more greens this week but will resume his usual diet.   Denies any medication changes.   No current symptoms of bleeding or thrombosis reported.  Confirmed dose. No missed doses.    Pt on antiplatelet therapy - none.    A/P:   INR subtherapeutic. No recent VTEs. CVA in .   Will increase tonight then continue current regimen.     Follow up appointment in 12 week(s) per pt and his caregiver though I suggested sooner f/u given hx of CVA. They want 12 week f/u and understand the risks.    Next Appointment: Thursday, May 20, 2021 at 11:00 am.    Khushboo FREED

## 2021-03-30 ENCOUNTER — TELEPHONE (OUTPATIENT)
Dept: MEDICAL GROUP | Age: 66
End: 2021-03-30

## 2021-03-30 DIAGNOSIS — H93.8X9 SENSATION OF FULLNESS IN EAR, UNSPECIFIED LATERALITY: ICD-10-CM

## 2021-03-30 DIAGNOSIS — H92.09 OTALGIA, UNSPECIFIED LATERALITY: ICD-10-CM

## 2021-03-30 NOTE — TELEPHONE ENCOUNTER
1. Caller Name: eduard                      Call Back Number: [phone] (home)         How would the patient prefer to be contacted with a response: Phone call do NOT leave a detailed message    2. SPECIFIC Action To Be Taken: Referral pending, please sign.    3. Diagnosis/Clinical Reason for Request: ear fullness and pain    4. Specialty & Provider Name/Lab/Imaging Location: n/a    5. Is appointment scheduled for requested order/referral: no    Patient was informed they will receive a return phone call from the office ONLY if there are any questions before processing their request. Advised to call back if they haven't received a call from the referral department in 5 days.

## 2021-04-09 ENCOUNTER — TELEPHONE (OUTPATIENT)
Dept: MEDICAL GROUP | Age: 66
End: 2021-04-09

## 2021-04-09 NOTE — TELEPHONE ENCOUNTER
"ESTABLISHED PATIENT PRE-VISIT PLANNING   Friday, 04/09/2021@1:25PM:    Patient was NOT contacted to complete PVP.     Note: Patient will not be contacted if there is no indication to call.     1.  Reviewed notes from the last few office visits within the medical group: Yes    2.  If any orders were placed at last visit or intended to be done for this visit (i.e. 6 mos follow-up), do we have Results/Consult Notes?         •  Labs - Labs ordered, but not to be completed until Per 's Office Visit notes on  02/11/2021, \"Followup: In 6 months, annual/labs\".  Note: If patient appointment is for lab review and patient did not complete labs, check with provider if OK to reschedule patient until labs completed.       •  Imaging - Imaging was not ordered at last office visit.       •  Referrals - Referral ordered, patient has NOT been seen.   Called & spoke to [facility]. Per their representative, patient hasn't been seen and their is no future scheduled appointments.      3. Is this appointment scheduled as a Hospital Follow-Up? No    4.  Immunizations were updated in Epic using Reconcile Outside Information activity? Yes    5.  Patient is due for the following Health Maintenance Topics:   Health Maintenance Due   Topic Date Due   • COVID-19 Vaccine (1) Never done       6.  AHA (Pulse8) form printed for Provider? N/A    "

## 2021-04-12 ENCOUNTER — OFFICE VISIT (OUTPATIENT)
Dept: MEDICAL GROUP | Age: 66
End: 2021-04-12
Payer: MEDICARE

## 2021-04-12 VITALS
WEIGHT: 160 LBS | HEIGHT: 72 IN | SYSTOLIC BLOOD PRESSURE: 134 MMHG | TEMPERATURE: 98.3 F | BODY MASS INDEX: 21.67 KG/M2 | OXYGEN SATURATION: 97 % | HEART RATE: 85 BPM | DIASTOLIC BLOOD PRESSURE: 70 MMHG

## 2021-04-12 DIAGNOSIS — H72.92 PERFORATION OF LEFT TYMPANIC MEMBRANE: ICD-10-CM

## 2021-04-12 DIAGNOSIS — H61.21 IMPACTED CERUMEN OF RIGHT EAR: ICD-10-CM

## 2021-04-12 PROCEDURE — 99999 PR NO CHARGE: CPT | Performed by: FAMILY MEDICINE

## 2021-04-12 PROCEDURE — 69210 REMOVE IMPACTED EAR WAX UNI: CPT | Performed by: FAMILY MEDICINE

## 2021-04-12 ASSESSMENT — FIBROSIS 4 INDEX: FIB4 SCORE: 1.91

## 2021-04-12 NOTE — PROGRESS NOTES
Subjective:   CC: cerumen impaction     HPI:     Drew Melton is a 65 y.o. male, established patient of the clinic, presents with the following concerns:     Pt presents with several weeks hx of bilateral ear fullness and muffled sound, right worse than left. He denies ear pain or discharged. He also denies fever, chills, congestion, nasal discharge, cough. He thinks that his left ear is full of wax. He tries to remove the wax using OTC products w/o success.     Current medicines (including changes today)  Current Outpatient Medications   Medication Sig Dispense Refill   • atorvastatin (LIPITOR) 20 MG Tab Take 1 tablet by mouth once daily 90 tablet 4   • potassium chloride SA (KDUR) 20 MEQ Tab CR Take 1 tablet by mouth once daily 90 tablet 4   • warfarin (COUMADIN) 2.5 MG Tab TAKE 1/2 TO 1 (ONE-HALF TO ONE) TABLET BY MOUTH ONCE DAILY AS DIRECTED BY THE COUMADIN CLINIC 90 tablet 4   • fenofibrate (TRICOR) 48 MG Tab Take 1 tablet by mouth once daily 90 tablet 4   • Dewni-Mdpfkcfx-Agnsp-RsHp-Zn-C ( IMMUNE SUPPORT COMPLEX PO) Take  by mouth.     • Multiple Vitamins-Minerals (CENTRUM SILVER 50+MEN) Tab Take  by mouth.     • gabapentin (NEURONTIN) 100 MG Cap Take 100 mg by mouth 3 times a day.     • Cholecalciferol (VITAMIN D3) 2000 UNIT CAPS Take 1 Cap by mouth every day. 90 Cap 3     No current facility-administered medications for this visit.     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    I personally reviewed patient's problem list, allergies, medications, family hx, social hx with patient and update EPIC.     REVIEW OF SYSTEMS:  CONSTITUTIONAL:  Denies night sweats, fatigue,  malaise, lethargy, fever or chills.  RESPIRATORY:  Denies cough, wheeze, hemoptysis, or shortness of breath.  CARDIOVASCULAR:  Denies chest pains, palpitations, pedal edema     Objective:     /70 (BP Location: Right arm, Patient Position: Sitting, BP Cuff Size: Adult)   Pulse 85   Temp 36.8 °C (98.3 °F) (Temporal)   Ht 1.829 m (6')   Wt 72.6 kg (160 lb)   SpO2 97%  Body mass index is 21.7 kg/m².    Physical Exam:  Constitutional: awake, alert, in no distress.  Skin: Warm, dry, good turgor, no rashes, bruises, ulcers in visible areas.  Eye: conjunctiva clear, lids neg for edema or lesions.  ENMT:    - severe cerumen impaction on the R ear  - small rupture of the left tympanic membrane.   Psych: Oriented x3, affect and mood wnl, intact judgement and insight.       Assessment and Plan:   The following treatment plan was discussed    1. Impacted cerumen of right ear  - curettage and lavage    2. Perforation of left tympanic membrane  Conservative management recommended and discussed.   F/u with PCP as needed.     Procedure note: ear wax removal.   R ear partially irrigated by MA. I personally removed the rest of ear wax using a lighted curette pt tolerated the procedure well, no immediate complication noted.       Luz Deng M.D.      Followup: Return for follow up with PCP.    Please note that this dictation was created using voice recognition software. I have made every reasonable attempt to correct obvious errors, but I expect that there are errors of grammar and possibly content that I did not discover before finalizing the note.

## 2021-05-20 ENCOUNTER — ANTICOAGULATION VISIT (OUTPATIENT)
Dept: VASCULAR LAB | Facility: MEDICAL CENTER | Age: 66
End: 2021-05-20
Attending: INTERNAL MEDICINE
Payer: MEDICARE

## 2021-05-20 VITALS — DIASTOLIC BLOOD PRESSURE: 76 MMHG | HEART RATE: 79 BPM | SYSTOLIC BLOOD PRESSURE: 125 MMHG

## 2021-05-20 DIAGNOSIS — Z79.01 CHRONIC ANTICOAGULATION: ICD-10-CM

## 2021-05-20 LAB
INR BLD: 1.7 (ref 0.9–1.2)
INR PPP: 1.7 (ref 2–3.5)

## 2021-05-20 PROCEDURE — 85610 PROTHROMBIN TIME: CPT

## 2021-05-20 PROCEDURE — 99212 OFFICE O/P EST SF 10 MIN: CPT | Performed by: NURSE PRACTITIONER

## 2021-05-20 NOTE — PROGRESS NOTES
Anticoagulation Summary  As of 2021    INR goal:  2.0-3.0   TTR:  69.2 % (5.9 y)   INR used for dosin.70 (2021)   Warfarin maintenance plan:  2.5 mg (2.5 mg x 1) every day   Weekly warfarin total:  17.5 mg   Plan last modified:  NALINI NashPALLY (2021)   Next INR check:     Target end date:  Indefinite    Indications    Chronic anticoagulation [Z79.01]  Deep vein thrombosis [453.40] (Resolved) [I82.409]  Stroke [434.91] (Resolved) [I63.9]  Deep vein thrombosis (DVT) (HCC) (Resolved) [I82.409]             Anticoagulation Episode Summary     INR check location:  Anticoagulation Clinic    Preferred lab:      Send INR reminders to:      Comments:        Anticoagulation Care Providers     Provider Role Specialty Phone number    Jamari Platt M.D. Referring Family Medicine [phone]    [facility] Anticoagulation Services   [phone]        Anticoagulation Patient Findings      HPI:  Drew Melton seen in clinic today for follow up on anticoagulation therapy in the presence of DVT, CVA hx.   Denies any changes to current medical/health status since last appointment per pt's caregiver.   Denies any medication or diet changes.   No current symptoms of bleeding or thrombosis reported.    Pt is not on antiplatelet therapy.    A/P:   INR subtherapeutic. INR trending low. No recent VTE/CVA. Will increase regimen.   BP recorded in vitals.    Pt educated to contact our clinic with any changes in medications or s/s of bleeding or thrombosis. Pt is aware to seek immediate medical attention for falls, head injury or deep cuts    Follow up appointment in 12 week(s) per pt and his caregiver though I suggested sooner f/u given hx of CVA. They want 12 week f/u and understand the risks.    Next Appointment:  at 11:00 am.    Khushboo FREED

## 2021-07-12 ENCOUNTER — HOSPITAL ENCOUNTER (OUTPATIENT)
Dept: LAB | Facility: MEDICAL CENTER | Age: 66
End: 2021-07-12
Attending: INTERNAL MEDICINE
Payer: MEDICARE

## 2021-07-12 LAB
BASOPHILS # BLD AUTO: 0.6 % (ref 0–1.8)
BASOPHILS # BLD: 0.03 K/UL (ref 0–0.12)
EOSINOPHIL # BLD AUTO: 0.17 K/UL (ref 0–0.51)
EOSINOPHIL NFR BLD: 3.5 % (ref 0–6.9)
ERYTHROCYTE [DISTWIDTH] IN BLOOD BY AUTOMATED COUNT: 46.9 FL (ref 35.9–50)
FOLATE SERPL-MCNC: >40 NG/ML
HCT VFR BLD AUTO: 42.4 % (ref 42–52)
HGB BLD-MCNC: 14.4 G/DL (ref 14–18)
IMM GRANULOCYTES # BLD AUTO: 0.01 K/UL (ref 0–0.11)
IMM GRANULOCYTES NFR BLD AUTO: 0.2 % (ref 0–0.9)
IRON SATN MFR SERPL: 23 % (ref 15–55)
IRON SERPL-MCNC: 82 UG/DL (ref 50–180)
LYMPHOCYTES # BLD AUTO: 1.84 K/UL (ref 1–4.8)
LYMPHOCYTES NFR BLD: 37.6 % (ref 22–41)
MCH RBC QN AUTO: 31.9 PG (ref 27–33)
MCHC RBC AUTO-ENTMCNC: 34 G/DL (ref 33.7–35.3)
MCV RBC AUTO: 94 FL (ref 81.4–97.8)
MONOCYTES # BLD AUTO: 0.42 K/UL (ref 0–0.85)
MONOCYTES NFR BLD AUTO: 8.6 % (ref 0–13.4)
NEUTROPHILS # BLD AUTO: 2.43 K/UL (ref 1.82–7.42)
NEUTROPHILS NFR BLD: 49.5 % (ref 44–72)
NRBC # BLD AUTO: 0 K/UL
NRBC BLD-RTO: 0 /100 WBC
PLATELET # BLD AUTO: 176 K/UL (ref 164–446)
PMV BLD AUTO: 9.6 FL (ref 9–12.9)
RBC # BLD AUTO: 4.51 M/UL (ref 4.7–6.1)
TIBC SERPL-MCNC: 358 UG/DL (ref 250–450)
UIBC SERPL-MCNC: 276 UG/DL (ref 110–370)
VIT B12 SERPL-MCNC: 789 PG/ML (ref 211–911)
WBC # BLD AUTO: 4.9 K/UL (ref 4.8–10.8)

## 2021-07-12 PROCEDURE — 82607 VITAMIN B-12: CPT

## 2021-07-12 PROCEDURE — 83550 IRON BINDING TEST: CPT

## 2021-07-12 PROCEDURE — 85025 COMPLETE CBC W/AUTO DIFF WBC: CPT

## 2021-07-12 PROCEDURE — 36415 COLL VENOUS BLD VENIPUNCTURE: CPT

## 2021-07-12 PROCEDURE — 82746 ASSAY OF FOLIC ACID SERUM: CPT

## 2021-07-12 PROCEDURE — 83540 ASSAY OF IRON: CPT

## 2021-07-13 ENCOUNTER — TELEPHONE (OUTPATIENT)
Dept: MEDICAL GROUP | Age: 66
End: 2021-07-13

## 2021-07-13 NOTE — PROGRESS NOTES
ANNUAL WELLNESS VISIT PRE-VISIT PLANNING    Tuesday, 07/13/2021@10:45AM:  Phone Number Called: [phone] (home) [phone] (work)  Call outcome: No answer. Left Voice Message.  Message: Advised office visit scheduled with , Wednesday, 07/14/2021@11:00AM, [address]. Request call back. Need to complete Pre-Visit Planning.    2nd attempt: Tuesday, 07/13/21@3:55PM  Phone Number Called: [phone] (home) [phone] (work)  Call outcome: Spoke to patient Contact Eris Amaral who is listed under demographics tab with permissions to discuss both billing and treatment    Message: Pre-Visit Planning Complete.    1.  Reviewed notes from the last office visit: Yes    2.  If any orders were ordered or intended to be done prior to visit (i.e. 6 mos follow-up), do we have results/consult notes or has patient scheduled?        •  Labs - Labs ordered, completed on 07/13/2021 and results are in chart.  Note: If patient appointment is for lab review and patient did not complete labs, check with provider if OK to reschedule patient until labs completed.       •  Imaging - Imaging was not ordered at last office visit.       •  Referrals - Referral ordered, patient has NOT been seen.     -Called & spoke to [facility]. Per their office staff, they left message for patient to schedule & have not heard back from patient.    3.  Immunizations were updated in Epic using Reconcile Outside Information activity? Yes       •  Is patient due for Tdap? NO       •  Is patient due for Shingrix? YES. Patient was notified of copay/out of pocket cost.     4.  Patient is due for the following Health Maintenance Topics:   Health Maintenance Due   Topic Date Due   • COVID-19 Vaccine (1) Never done   • IMM ZOSTER VACCINES (1 of 2) Never done     5.  Reviewed/Updated the following with patient:       •   Preferred Pharmacy? Yes       •   Preferred Lab? Yes       •   Preferred Communication? Yes       •    Allergies?Yes       •   Medications?Yes       •   Social History? Yes       •   Family History (document living status of immediate family members and if + hx of  cancer, diabetes, hypertension, hyperlipidemia, heart attack, stroke) YES    6.  Care Team Updated:       •   DME Company (gait device, O2, CPAP, etc.): [employer]       •   Other Specialists (eye doctor, derm, GYN, cardiology, endo, etc): Yes, [employer]. Updated Contacts & Medical records requested.    7.  Patient was advised: “This is a free wellness visit. The provider will screen for medical conditions to help you stay healthy. If you have other concerns to address you may be asked to discuss these at a separate visit or there may be an additional fee.”   Called patient, no answer, not reviewed.    8.  AHA (Puls8) form printed for Provider? N/A

## 2021-07-13 NOTE — TELEPHONE ENCOUNTER
ESTABLISHED PATIENT PRE-VISIT PLANNING   Tuesday, 07/13/2021@10:31AM:    Error: Please reference Abstract Encounter for [facility] Pre-Visit Planning.

## 2021-07-13 NOTE — LETTER
[facility]  Jamari Platt M.D.  [address] [facility]   [place] [address]  Fax: [phone]   Authorization for Release/Disclosure of   Protected Health Information   Name: GLADYS BARRAZA : 1955 SSN: [id number]   Address: [address] [place] [address] [address]  [place] [address] Phone:    [phone] (home) [phone] (work)   I authorize the entity listed below to release/disclose the PHI below to:   [facility]/Jamari Platt M.D. and Jamari Platt M.D.   Provider or Entity Name:  Sabrina Camarillo   Address   City, [facility], Zip  [address] [place], [place] [address] [address] Phone:  [phone]    Fax:     Reason for request: continuity of care   Information to be released:    [  ] LAST COLONOSCOPY,  including any PATH REPORT and follow-up  [  ] LAST FIT/COLOGUARD RESULT [  ] LAST DEXA  [  ] LAST MAMMOGRAM  [  ] LAST PAP  [  ] LAST LABS [  ] RETINA EXAM REPORT  [  ] IMMUNIZATION RECORDS  [ XXXX ] Release all info      [  ] Check here and initial the line next to each item to release ALL health information INCLUDING  _____ Care and treatment for drug and / or alcohol abuse  _____ HIV testing, infection status, or AIDS  _____ Genetic Testing    DATES OF SERVICE OR TIME PERIOD TO BE DISCLOSED: _____________  I understand and acknowledge that:  * This Authorization may be revoked at any time by you in writing, except if your health information has already been used or disclosed.  * Your health information that will be used or disclosed as a result of you signing this authorization could be re-disclosed by the recipient. If this occurs, your re-disclosed health information may no longer be protected by State or Federal laws.  * You may refuse to sign this Authorization. Your refusal will not affect your ability to obtain treatment.  * This Authorization becomes effective upon signing and will  on (date) __________.      If no date is indicated, this Authorization will  one (1) year from the  signature date.    Name: Drew Melton    Signature: Continuity of Care   Date:     7/13/2021       PLEASE FAX REQUESTED RECORDS BACK TO: [phone]

## 2021-07-14 ENCOUNTER — OFFICE VISIT (OUTPATIENT)
Dept: MEDICAL GROUP | Age: 66
End: 2021-07-14
Payer: MEDICARE

## 2021-07-14 VITALS
BODY MASS INDEX: 21.67 KG/M2 | DIASTOLIC BLOOD PRESSURE: 64 MMHG | OXYGEN SATURATION: 95 % | TEMPERATURE: 98 F | HEART RATE: 73 BPM | HEIGHT: 72 IN | WEIGHT: 160 LBS | SYSTOLIC BLOOD PRESSURE: 118 MMHG

## 2021-07-14 DIAGNOSIS — M79.672 LEFT FOOT PAIN: ICD-10-CM

## 2021-07-14 DIAGNOSIS — E87.6 HYPOKALEMIA: ICD-10-CM

## 2021-07-14 DIAGNOSIS — D64.9 ANEMIA, UNSPECIFIED TYPE: ICD-10-CM

## 2021-07-14 DIAGNOSIS — Z86.73 H/O: CVA (CEREBROVASCULAR ACCIDENT): ICD-10-CM

## 2021-07-14 DIAGNOSIS — Z89.519 S/P BKA (BELOW KNEE AMPUTATION) UNILATERAL (HCC): ICD-10-CM

## 2021-07-14 DIAGNOSIS — E55.9 HYPOVITAMINOSIS D: ICD-10-CM

## 2021-07-14 DIAGNOSIS — I69.959 HEMIPLEGIA OF DOMINANT SIDE AS LATE EFFECT FOLLOWING CEREBROVASCULAR DISEASE (HCC): ICD-10-CM

## 2021-07-14 DIAGNOSIS — Z79.01 CHRONIC ANTICOAGULATION: ICD-10-CM

## 2021-07-14 DIAGNOSIS — E78.5 DYSLIPIDEMIA (HIGH LDL; LOW HDL): Chronic | ICD-10-CM

## 2021-07-14 PROBLEM — D69.6 THROMBOCYTOPENIA (HCC): Status: RESOLVED | Noted: 2018-12-21 | Resolved: 2021-07-14

## 2021-07-14 PROCEDURE — 99214 OFFICE O/P EST MOD 30 MIN: CPT | Performed by: INTERNAL MEDICINE

## 2021-07-14 RX ORDER — NAPROXEN 375 MG/1
375 TABLET ORAL 2 TIMES DAILY WITH MEALS
Qty: 60 TABLET | Refills: 3 | Status: SHIPPED | OUTPATIENT
Start: 2021-07-14 | End: 2022-02-08

## 2021-07-14 ASSESSMENT — FIBROSIS 4 INDEX: FIB4 SCORE: 1.82
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
## 2021-08-12 ENCOUNTER — ANTICOAGULATION VISIT (OUTPATIENT)
Dept: VASCULAR LAB | Facility: MEDICAL CENTER | Age: 66
End: 2021-08-12
Attending: INTERNAL MEDICINE
Payer: MEDICARE

## 2021-08-12 VITALS — SYSTOLIC BLOOD PRESSURE: 112 MMHG | HEART RATE: 80 BPM | DIASTOLIC BLOOD PRESSURE: 86 MMHG

## 2021-08-12 DIAGNOSIS — Z79.01 CHRONIC ANTICOAGULATION: ICD-10-CM

## 2021-08-12 LAB
INR BLD: 2.3 (ref 0.9–1.2)
INR PPP: 2.4 (ref 2–3.5)

## 2021-08-12 PROCEDURE — 99211 OFF/OP EST MAY X REQ PHY/QHP: CPT | Performed by: NURSE PRACTITIONER

## 2021-08-12 PROCEDURE — 85610 PROTHROMBIN TIME: CPT

## 2021-08-12 NOTE — PROGRESS NOTES
Anticoagulation Summary  As of 2021    INR goal:  2.0-3.0   TTR:  68.8 % (6.2 y)   INR used for dosin.40 (2021)   Warfarin maintenance plan:  2.5 mg (2.5 mg x 1) every day   Weekly warfarin total:  17.5 mg   Plan last modified:  GAMAL Nash (2021)   Next INR check:  2021   Target end date:  Indefinite    Indications    Chronic anticoagulation [Z79.01]  Deep vein thrombosis [453.40] (Resolved) [I82.409]  Stroke [434.91] (Resolved) [I63.9]  Deep vein thrombosis (DVT) (HCC) (Resolved) [I82.409]             Anticoagulation Episode Summary     INR check location:  Anticoagulation Clinic    Preferred lab:      Send INR reminders to:      Comments:        Anticoagulation Care Providers     Provider Role Specialty Phone number    Jamari Platt M.D. Referring Family Medicine [phone]    [facility]   [phone]                Refer to Patient Findings for HPI:  Patient Findings     Negatives:  Signs/symptoms of thrombosis, Signs/symptoms of bleeding, Laboratory test error suspected, Change in health, Change in alcohol use, Change in activity, Upcoming invasive procedure, Emergency department visit, Upcoming dental procedure, Missed doses, Extra doses, Change in medications, Change in diet/appetite, Hospital admission, Bruising, Other complaints            Vitals:    21 1056   BP: 112/86   Pulse: 80       Verified current warfarin dosing schedule.    Medications reconciled   Pt is not on antiplatelet therapy      A/P   INR  -therapeutic.     Warfarin dosing recommendation: Continue current regimen    Pt educated to contact our clinic with any changes in medications or s/s of bleeding or thrombosis. Pt is aware to seek immediate medical attention for falls, head injury or deep cuts.    Follow up appointment in 12 week(s).    GAMAL Nash

## 2021-10-02 DIAGNOSIS — E78.5 DYSLIPIDEMIA (HIGH LDL; LOW HDL): Chronic | ICD-10-CM

## 2021-10-02 RX ORDER — ATORVASTATIN CALCIUM 20 MG/1
TABLET, FILM COATED ORAL
Qty: 90 TABLET | Refills: 3 | Status: SHIPPED | OUTPATIENT
Start: 2021-10-02 | End: 2022-10-03 | Stop reason: SDUPTHER

## 2021-11-04 ENCOUNTER — ANTICOAGULATION VISIT (OUTPATIENT)
Dept: VASCULAR LAB | Facility: MEDICAL CENTER | Age: 66
End: 2021-11-04
Attending: INTERNAL MEDICINE
Payer: MEDICARE

## 2021-11-04 DIAGNOSIS — Z79.01 CHRONIC ANTICOAGULATION: ICD-10-CM

## 2021-11-04 LAB — INR PPP: 2.8 (ref 2–3.5)

## 2021-11-04 PROCEDURE — 85610 PROTHROMBIN TIME: CPT

## 2021-11-04 PROCEDURE — 99211 OFF/OP EST MAY X REQ PHY/QHP: CPT

## 2021-11-04 RX ORDER — WARFARIN SODIUM 2.5 MG/1
TABLET ORAL
Qty: 90 TABLET | Refills: 4 | Status: SHIPPED | OUTPATIENT
Start: 2021-11-04 | End: 2022-09-29 | Stop reason: SDUPTHER

## 2022-01-04 DIAGNOSIS — Z79.01 CHRONIC ANTICOAGULATION: ICD-10-CM

## 2022-01-27 ENCOUNTER — ANTICOAGULATION VISIT (OUTPATIENT)
Dept: VASCULAR LAB | Facility: MEDICAL CENTER | Age: 67
End: 2022-01-27
Attending: INTERNAL MEDICINE
Payer: MEDICARE

## 2022-01-27 DIAGNOSIS — Z79.01 CHRONIC ANTICOAGULATION: ICD-10-CM

## 2022-01-27 LAB
INR BLD: 2.2 (ref 0.9–1.2)
INR PPP: 2.2 (ref 2–3.5)

## 2022-01-27 PROCEDURE — 99211 OFF/OP EST MAY X REQ PHY/QHP: CPT

## 2022-01-27 PROCEDURE — 85610 PROTHROMBIN TIME: CPT

## 2022-01-27 RX ORDER — ASPIRIN 81 MG/1
81 TABLET ORAL DAILY
COMMUNITY
End: 2022-05-04

## 2022-01-27 NOTE — Clinical Note
Dr Lindo,    Pt seen in coumadin clinic and reports taking ASA and was not sure for what. He has appointment with you in about 2 weeks on 2/8/22. He has not had issue with ASA and wanted to bring to your attention for assessment on whether to d/c.    Thank you,

## 2022-01-27 NOTE — PROGRESS NOTES
Anticoagulation Summary  As of 2022    INR goal:  2.0-3.0   TTR:  71.0 % (6.6 y)   INR used for dosin.20 (2022)   Warfarin maintenance plan:  2.5 mg (2.5 mg x 1) every day   Weekly warfarin total:  17.5 mg   Plan last modified:  GAMAL Nash (2021)   Next INR check:  2022   Target end date:  Indefinite    Indications    Chronic anticoagulation [Z79.01]  Deep vein thrombosis [453.40] (Resolved) [I82.409]  Stroke [434.91] (Resolved) [I63.9]  Deep vein thrombosis (DVT) (HCC) (Resolved) [I82.409]             Anticoagulation Episode Summary     INR check location:  Anticoagulation Clinic    Preferred lab:      Send INR reminders to:      Comments:        Anticoagulation Care Providers     Provider Role Specialty Phone number    Jamari Platt M.D. Referring Family Medicine [phone]    [facility]   [phone]                Refer to Patient Findings for HPI:  Patient Findings     Negatives:  Signs/symptoms of thrombosis, Signs/symptoms of bleeding, Laboratory test error suspected, Change in health, Change in alcohol use, Change in activity, Upcoming invasive procedure, Emergency department visit, Upcoming dental procedure, Missed doses, Extra doses, Change in medications, Change in diet/appetite, Hospital admission, Bruising, Other complaints          There were no vitals filed for this visit.   pt declined vitals    Verified current warfarin dosing schedule.    Medications reconciled   Pt reports taking 81mg ASA. Previously noted not on ASA and pt unsure of indication for ASA.   - Pt has PCP appt in ~2 weeks - will forward to Dr Lindo      A/P   INR  remains therapeutic.     Warfarin dosing recommendation: Pt is to continue with current warfarin dosing regimen.      Pt educated to contact our clinic with any changes in medications or s/s of bleeding or thrombosis. Pt is aware to seek immediate medical attention for falls, head injury or deep  cuts.    Follow up appointment in 12 week(s).    Alexander Gaspar, LyndseyD

## 2022-01-31 ENCOUNTER — HOSPITAL ENCOUNTER (OUTPATIENT)
Facility: MEDICAL CENTER | Age: 67
End: 2022-01-31
Attending: INTERNAL MEDICINE
Payer: MEDICARE

## 2022-01-31 ENCOUNTER — HOSPITAL ENCOUNTER (OUTPATIENT)
Dept: LAB | Facility: MEDICAL CENTER | Age: 67
End: 2022-01-31
Attending: INTERNAL MEDICINE
Payer: MEDICARE

## 2022-01-31 DIAGNOSIS — E78.5 DYSLIPIDEMIA (HIGH LDL; LOW HDL): Chronic | ICD-10-CM

## 2022-01-31 DIAGNOSIS — E87.6 HYPOKALEMIA: ICD-10-CM

## 2022-01-31 DIAGNOSIS — D64.9 ANEMIA, UNSPECIFIED TYPE: ICD-10-CM

## 2022-01-31 LAB
ALBUMIN SERPL BCP-MCNC: 4.5 G/DL (ref 3.2–4.9)
ALBUMIN/GLOB SERPL: 1.8 G/DL
ALP SERPL-CCNC: 56 U/L (ref 30–99)
ALT SERPL-CCNC: 23 U/L (ref 2–50)
ANION GAP SERPL CALC-SCNC: 11 MMOL/L (ref 7–16)
AST SERPL-CCNC: 26 U/L (ref 12–45)
BASOPHILS # BLD AUTO: 0.8 % (ref 0–1.8)
BASOPHILS # BLD: 0.04 K/UL (ref 0–0.12)
BILIRUB SERPL-MCNC: 0.8 MG/DL (ref 0.1–1.5)
BUN SERPL-MCNC: 29 MG/DL (ref 8–22)
CALCIUM SERPL-MCNC: 9.9 MG/DL (ref 8.5–10.5)
CHLORIDE SERPL-SCNC: 110 MMOL/L (ref 96–112)
CHOLEST SERPL-MCNC: 118 MG/DL (ref 100–199)
CO2 SERPL-SCNC: 24 MMOL/L (ref 20–33)
CREAT SERPL-MCNC: 0.77 MG/DL (ref 0.5–1.4)
EOSINOPHIL # BLD AUTO: 0.18 K/UL (ref 0–0.51)
EOSINOPHIL NFR BLD: 3.4 % (ref 0–6.9)
ERYTHROCYTE [DISTWIDTH] IN BLOOD BY AUTOMATED COUNT: 47.1 FL (ref 35.9–50)
FASTING STATUS PATIENT QL REPORTED: NORMAL
GLOBULIN SER CALC-MCNC: 2.5 G/DL (ref 1.9–3.5)
GLUCOSE SERPL-MCNC: 86 MG/DL (ref 65–99)
HCT VFR BLD AUTO: 40.8 % (ref 42–52)
HDLC SERPL-MCNC: 31 MG/DL
HGB BLD-MCNC: 14 G/DL (ref 14–18)
IMM GRANULOCYTES # BLD AUTO: 0.01 K/UL (ref 0–0.11)
IMM GRANULOCYTES NFR BLD AUTO: 0.2 % (ref 0–0.9)
LDLC SERPL CALC-MCNC: 53 MG/DL
LYMPHOCYTES # BLD AUTO: 2 K/UL (ref 1–4.8)
LYMPHOCYTES NFR BLD: 38.3 % (ref 22–41)
MCH RBC QN AUTO: 31.8 PG (ref 27–33)
MCHC RBC AUTO-ENTMCNC: 34.3 G/DL (ref 33.7–35.3)
MCV RBC AUTO: 92.7 FL (ref 81.4–97.8)
MONOCYTES # BLD AUTO: 0.45 K/UL (ref 0–0.85)
MONOCYTES NFR BLD AUTO: 8.6 % (ref 0–13.4)
NEUTROPHILS # BLD AUTO: 2.54 K/UL (ref 1.82–7.42)
NEUTROPHILS NFR BLD: 48.7 % (ref 44–72)
NRBC # BLD AUTO: 0 K/UL
NRBC BLD-RTO: 0 /100 WBC
PLATELET # BLD AUTO: 178 K/UL (ref 164–446)
PMV BLD AUTO: 9.8 FL (ref 9–12.9)
POTASSIUM SERPL-SCNC: 3.9 MMOL/L (ref 3.6–5.5)
PROT SERPL-MCNC: 7 G/DL (ref 6–8.2)
RBC # BLD AUTO: 4.4 M/UL (ref 4.7–6.1)
SODIUM SERPL-SCNC: 145 MMOL/L (ref 135–145)
TRIGL SERPL-MCNC: 171 MG/DL (ref 0–149)
WBC # BLD AUTO: 5.2 K/UL (ref 4.8–10.8)

## 2022-01-31 PROCEDURE — 82274 ASSAY TEST FOR BLOOD FECAL: CPT

## 2022-01-31 PROCEDURE — 80053 COMPREHEN METABOLIC PANEL: CPT

## 2022-01-31 PROCEDURE — 80061 LIPID PANEL: CPT

## 2022-01-31 PROCEDURE — 85025 COMPLETE CBC W/AUTO DIFF WBC: CPT

## 2022-01-31 PROCEDURE — 36415 COLL VENOUS BLD VENIPUNCTURE: CPT

## 2022-02-02 DIAGNOSIS — D64.9 ANEMIA, UNSPECIFIED TYPE: ICD-10-CM

## 2022-02-04 DIAGNOSIS — R19.5 POSITIVE FIT (FECAL IMMUNOCHEMICAL TEST): ICD-10-CM

## 2022-02-04 LAB — IMM ASSAY OCC BLD FITOB: POSITIVE

## 2022-02-08 ENCOUNTER — OFFICE VISIT (OUTPATIENT)
Dept: MEDICAL GROUP | Age: 67
End: 2022-02-08
Payer: MEDICARE

## 2022-02-08 VITALS
RESPIRATION RATE: 14 BRPM | OXYGEN SATURATION: 95 % | TEMPERATURE: 98.4 F | DIASTOLIC BLOOD PRESSURE: 80 MMHG | HEART RATE: 80 BPM | SYSTOLIC BLOOD PRESSURE: 150 MMHG

## 2022-02-08 DIAGNOSIS — E78.5 DYSLIPIDEMIA (HIGH LDL; LOW HDL): Chronic | ICD-10-CM

## 2022-02-08 DIAGNOSIS — Z86.73 H/O: CVA (CEREBROVASCULAR ACCIDENT): ICD-10-CM

## 2022-02-08 DIAGNOSIS — R53.81 PHYSICAL DECONDITIONING: ICD-10-CM

## 2022-02-08 DIAGNOSIS — E55.9 HYPOVITAMINOSIS D: ICD-10-CM

## 2022-02-08 DIAGNOSIS — Z79.01 CHRONIC ANTICOAGULATION: ICD-10-CM

## 2022-02-08 DIAGNOSIS — Z00.00 MEDICARE ANNUAL WELLNESS VISIT, SUBSEQUENT: ICD-10-CM

## 2022-02-08 DIAGNOSIS — B35.1 ONYCHOMYCOSIS OF RIGHT GREAT TOE: ICD-10-CM

## 2022-02-08 DIAGNOSIS — I69.959 HEMIPLEGIA OF DOMINANT SIDE AS LATE EFFECT FOLLOWING CEREBROVASCULAR DISEASE (HCC): ICD-10-CM

## 2022-02-08 DIAGNOSIS — Z89.519 S/P BKA (BELOW KNEE AMPUTATION) UNILATERAL (HCC): ICD-10-CM

## 2022-02-08 DIAGNOSIS — I69.919 COGNITIVE DEFICITS AS LATE EFFECT OF CEREBROVASCULAR DISEASE: ICD-10-CM

## 2022-02-08 DIAGNOSIS — Z00.00 HEALTH CARE MAINTENANCE: ICD-10-CM

## 2022-02-08 DIAGNOSIS — Z99.3 WHEELCHAIR DEPENDENCE: ICD-10-CM

## 2022-02-08 PROCEDURE — G0439 PPPS, SUBSEQ VISIT: HCPCS | Performed by: INTERNAL MEDICINE

## 2022-02-08 ASSESSMENT — ENCOUNTER SYMPTOMS: GENERAL WELL-BEING: GOOD

## 2022-02-08 ASSESSMENT — PATIENT HEALTH QUESTIONNAIRE - PHQ9: CLINICAL INTERPRETATION OF PHQ2 SCORE: 0

## 2022-02-08 ASSESSMENT — ACTIVITIES OF DAILY LIVING (ADL): BATHING_REQUIRES_ASSISTANCE: 1

## 2022-02-08 NOTE — PROGRESS NOTES
Chief Complaint   Patient presents with   • Medicare Annual Wellness     HPI:  Drew Melton is a 66 y.o. here for Medicare Annual Wellness Visit     Patient Active Problem List    Diagnosis Date Noted   • Medicare annual wellness visit, subsequent 07/30/2020   • Hypovitaminosis D 01/30/2020   • S/P BKA (below knee amputation) unilateral (HCC) 06/21/2017   • Physical deconditioning 03/15/2017   • Health care maintenance 09/15/2016   • H/O: CVA (cerebrovascular accident) 09/15/2016   • Wheelchair bound 03/18/2016   • Chronic anticoagulation 10/08/2013   • Dyslipidemia (high LDL; low HDL) 10/08/2012   • Cognitive deficits as late effect of cerebrovascular disease    • Hemiplegia of dominant side as late effect following cerebrovascular disease (HCC)      Current Outpatient Medications   Medication Sig Dispense Refill   • aspirin 81 MG EC tablet Take 81 mg by mouth every day.     • warfarin (COUMADIN) 2.5 MG Tab TAKE 1/2 TO 1 (ONE-HALF TO ONE) TABLET BY MOUTH ONCE DAILY AS DIRECTED BY THE COUMADIN CLINIC 90 Tablet 4   • atorvastatin (LIPITOR) 20 MG Tab Take 1 tablet by mouth once daily 90 Tablet 3   • potassium chloride SA (KDUR) 20 MEQ Tab CR Take 1 tablet by mouth once daily 90 tablet 4   • fenofibrate (TRICOR) 48 MG Tab Take 1 tablet by mouth once daily 90 tablet 4   • Hafml-Gjqhlimx-Mfvud-RsHp-Zn-C (HM IMMUNE SUPPORT COMPLEX PO) Take  by mouth.     • Multiple Vitamins-Minerals (CENTRUM SILVER 50+MEN) Tab Take  by mouth.     • gabapentin (NEURONTIN) 100 MG Cap Take 100 mg by mouth 3 times a day.     • Cholecalciferol (VITAMIN D3) 2000 UNIT CAPS Take 1 Cap by mouth every day. 90 Cap 3     No current facility-administered medications for this visit.          Current supplements as per medication list.     Allergies: Patient has no known allergies.    He  reports that he quit smoking about 20 years ago. His smoking use included cigarettes. He has never used smokeless tobacco. He reports that he does not drink  alcohol and does not use drugs.  Counseling given: Not Answered      DPA/Advanced Directive:  Patient has Advanced Directive on file.     ROS:    Gait: Uses walker  Ostomy: No  Other tubes: No  Amputations: Yes  Chronic oxygen use: No  Wears hearing aids: No   : Denies any urinary leakage during the last 6 months    Screening:    Depression Screening    Little interest or pleasure in doing things?  0 - not at all  Feeling down, depressed , or hopeless? 0 - not at all  Patient Health Questionnaire Score: 0     Screening for Cognitive Impairment  Three Minute Recall (captain, garden, picture) 1/3    Yariel clock face with all 12 numbers and set the hands to show 5 past 8.  No    Cognitive concerns identified deferred for follow up unless specifically addressed in assessment and plan.    Fall Risk Assessment  Has the patient had two or more falls in the last year or any fall with injury in the last year?  No    Safety Assessment  Throw rugs on floor.  No  Handrails on all stairs.  No  Good lighting in all hallways.  No  Difficulty hearing.  No  Patient counseled about all safety risks that were identified.    Functional Assessment ADLs  Are there any barriers preventing you from cooking for yourself or meeting nutritional needs?  No.    Are there any barriers preventing you from driving safely or obtaining transportation?  Yes.    Are there any barriers preventing you from using a telephone or calling for help?  No.    Are there any barriers preventing you from shopping?  Yes.    Are there any barriers preventing you from taking care of your own finances?  Yes.    Are there any barriers preventing you from managing your medications?  Yes.    Are there any barriers preventing you from showering, bathing or dressing yourself?  Yes.    Are you currently engaging in any exercise or physical activity?  Yes.     What is your perception of your health?  Good.    Health Maintenance Summary          Overdue - IMM ZOSTER  VACCINES (1 of 2) Overdue - never done    No completion history exists for this topic.          Overdue - IMM INFLUENZA (1) Overdue since 2021    10/30/2018  Imm Admin: Influenza Vaccine Quad Inj (Pf)    10/25/2017  Imm Admin: Influenza Vaccine Quad Inj (Pf)    10/21/2014  Imm Admin: Influenza Vaccine Quad Inj (Preserved)    10/21/2014  Imm Admin: Influenza (IM) Preservative Free - HISTORICAL DATA    10/08/2013  Imm Admin: INFLUENZA TIV (IM)    Only the first 5 history entries have been loaded, but more history exists.          Overdue - IMM PNEUMOCOCCAL VACCINE: 65+ Years (1 of 1 - PPSV23) Overdue since 2021    10/21/2014  Imm Admin: Pneumococcal polysaccharide vaccine (PPSV-23)    10/08/2012  Imm Admin: Pneumococcal polysaccharide vaccine (PPSV-23)          Overdue - COVID-19 Vaccine (3 - Booster for Pfizer series) Overdue since 2021  Imm Admin: Pfizer SARS-CoV-2 Vaccine 12+    2021  Imm Admin: Pfizer SARS-CoV-2 Vaccine 12+          Annual Wellness Visit (Every 366 Days) Next due on 2022  Visit Dx: Medicare annual wellness visit, subsequent    2022  Subsequent Annual Wellness Visit - Includes PPPS ()    2020  Done    2020  Visit Dx: Medicare annual wellness visit, subsequent    2020  Prob Dx: Medicare annual wellness visit, subsequent    Only the first 5 history entries have been loaded, but more history exists.          IMM DTaP/Tdap/Td Vaccine (2 - Td or Tdap) Next due on 2013  Imm Admin: Tdap Vaccine          COLORECTAL CANCER SCREENING (COLONOSCOPY - Every 10 Years) Next due on 2022  OCCULT BLOOD FECES IMMUNOASSAY    2018  COLON CANCER SCREENING ANNUAL FIT (Done)    2018  Surgical Procedure: COLONOSCOPY    2017  OCCULT BLOOD FECES IMMUNOASSAY (FIT)    2013  COLONOSCOPY (Patient Declined)          HEPATITIS C SCREENING  Completed    2020  HCV Scrn ( [id number]  [employer])          IMM HEP B VACCINE (Series Information) Aged Out    No completion history exists for this topic.          IMM MENINGOCOCCAL VACCINE (MCV4) (Series Information) Aged Out    No completion history exists for this topic.              Patient Care Team:  Jamari Platt M.D. as PCP - General (Family Medicine)  Ange Carrillo MA, CCC-SLP (Inactive) as Speech Therapist (Speech Therapy)  LAM Nicolas as Mid Level Provider (Family Medicine)  [facility]  MARY Johnson as Attending Team Physician (Gastroenterology)  Sabrina Camarillo    Social History     Tobacco Use   • Smoking status: Former Smoker     Types: Cigarettes     Quit date: 2001     Years since quittin.4   • Smokeless tobacco: Never Used   Vaping Use   • Vaping Use: Never used   Substance Use Topics   • Alcohol use: No     Alcohol/week: 0.0 oz     Comment: former abuser   • Drug use: No     Family History   Problem Relation Age of Onset   • No Known Problems Mother    • No Known Problems Father    • No Known Problems Maternal Grandmother    • No Known Problems Maternal Grandfather    • No Known Problems Paternal Grandmother    • No Known Problems Paternal Grandfather    • Stroke Neg Hx    • Diabetes Neg Hx    • Cancer Neg Hx    • Heart Disease Neg Hx    • Hypertension Neg Hx    • Hyperlipidemia Neg Hx      He  has a past medical history of Cataract, Cognitive deficits, late effect of cerebrovascular disease, Cold (2018), Hemiplegia affecting dominant side, late effect of cerebrovascular disease, High cholesterol, 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   Past Surgical History:   Procedure Laterality Date   • COLONOSCOPY  2018    Procedure: COLONOSCOPY;  Surgeon: Lenin Danielle M.D.;  Location: SURGERY [facility];  Service:  Gastroenterology   • AMPUTATION, BELOW THE KNEE Right 2001   • OTHER      cataract IOLI     Exam:   Blood Pressure 150/80 (BP Location: Left arm, Patient Position: Sitting, BP Cuff Size: Adult)   Pulse 80   Temperature 36.9 °C (98.4 °F)   Respiration 14   Oxygen Saturation 95%  There is no height or weight on file to calculate BMI.  Hearing fair.    Dentition fair  Alert, oriented in no acute distress.  Eye contact is good, speech goal directed, affect calm    Labs  Reviewed and discussed  Lab Results   Component Value Date/Time    CHOLSTRLTOT 118 01/31/2022 07:43 AM    LDL 53 01/31/2022 07:43 AM    HDL 31 (A) 01/31/2022 07:43 AM    TRIGLYCERIDE 171 (H) 01/31/2022 07:43 AM       Lab Results   Component Value Date/Time    SODIUM 145 01/31/2022 07:43 AM    POTASSIUM 3.9 01/31/2022 07:43 AM    CHLORIDE 110 01/31/2022 07:43 AM    CO2 24 01/31/2022 07:43 AM    GLUCOSE 86 01/31/2022 07:43 AM    BUN 29 (H) 01/31/2022 07:43 AM    CREATININE 0.77 01/31/2022 07:43 AM     Lab Results   Component Value Date/Time    ALKPHOSPHAT 56 01/31/2022 07:43 AM    ASTSGOT 26 01/31/2022 07:43 AM    ALTSGPT 23 01/31/2022 07:43 AM    TBILIRUBIN 0.8 01/31/2022 07:43 AM      Lab Results   Component Value Date/Time    HBA1C 5.1 05/14/2018 06:19 AM    HBA1C 4.9 06/13/2017 06:40 AM     No results found for: TSH  No results found for: FREET4  Lab Results   Component Value Date/Time    WBC 5.2 01/31/2022 07:43 AM    RBC 4.40 (L) 01/31/2022 07:43 AM    HEMOGLOBIN 14.0 01/31/2022 07:43 AM    HEMATOCRIT 40.8 (L) 01/31/2022 07:43 AM    MCV 92.7 01/31/2022 07:43 AM    MCH 31.8 01/31/2022 07:43 AM    MCHC 34.3 01/31/2022 07:43 AM    MPV 9.8 01/31/2022 07:43 AM    NEUTSPOLYS 48.70 01/31/2022 07:43 AM    LYMPHOCYTES 38.30 01/31/2022 07:43 AM    MONOCYTES 8.60 01/31/2022 07:43 AM    EOSINOPHILS 3.40 01/31/2022 07:43 AM    BASOPHILS 0.80 01/31/2022 07:43 AM      Assessment and Plan    1. Medicare annual wellness visit, subsequent  Reviewed PMH, PSH, FH,  SH, ALL, MEDS, HCM/IMM.   - Subsequent Annual Wellness Visit - Includes PPPS ()    2. Health care maintenance  Per HPI  - Subsequent Annual Wellness Visit - Includes PPPS ()    3. Dyslipidemia (high LDL; low HDL)  - Controlled, continue with current management.  - Subsequent Annual Wellness Visit - Includes PPPS ()    4. Hypovitaminosis D  - Controlled, continue with current management.  - Subsequent Annual Wellness Visit - Includes PPPS ()    5. H/O: CVA (cerebrovascular accident)  BP is controlled, on statin  - Subsequent Annual Wellness Visit - Includes PPPS ()  6. Chronic anticoagulation  - Subsequent Annual Wellness Visit - Includes PPPS ()  7. S/P BKA (below knee amputation) unilateral (HCC)  - Subsequent Annual Wellness Visit - Includes PPPS ()  8. Cognitive deficits as late effect of cerebrovascular disease  - Subsequent Annual Wellness Visit - Includes PPPS ()  9. Hemiplegia of dominant side as late effect following cerebrovascular disease (HCC)  - Subsequent Annual Wellness Visit - Includes PPPS ()  10. Physical deconditioning  - Subsequent Annual Wellness Visit - Includes PPPS ()  11. Wheelchair bound  - Subsequent Annual Wellness Visit - Includes PPPS ()    12. Onychomycosis of right great toe  - Subsequent Annual Wellness Visit - Includes PPPS ()  - Referral to Podiatry    Services suggested: No services needed at this time  Health Care Screening: Age-appropriate preventive services recommended by USPTF and ACIP covered by Medicare were discussed today. Services ordered if indicated and agreed upon by the patient.  Referrals offered: Community-based lifestyle interventions to reduce health risks and promote self-management and wellness, fall prevention, nutrition, physical activity, tobacco-use cessation, weight loss, and mental health services as per orders if indicated.    Discussion today about general wellness and lifestyle habits:     · Prevent falls and reduce trip hazards; Cautioned about securing or removing rugs.  · Have a working fire alarm and carbon monoxide detector;   · Engage in regular physical activity and social activities     Follow-up: Return in about 6 months (around 8/8/2022) for LABS.

## 2022-02-25 DIAGNOSIS — E87.6 HYPOKALEMIA: ICD-10-CM

## 2022-02-25 RX ORDER — POTASSIUM CHLORIDE 20 MEQ/1
TABLET, EXTENDED RELEASE ORAL
Qty: 90 TABLET | Refills: 3 | Status: SHIPPED | OUTPATIENT
Start: 2022-02-25 | End: 2022-10-03 | Stop reason: SDUPTHER

## 2022-03-28 ENCOUNTER — TELEPHONE (OUTPATIENT)
Dept: MEDICAL GROUP | Age: 67
End: 2022-03-28
Payer: MEDICARE

## 2022-03-28 NOTE — TELEPHONE ENCOUNTER
1. Caller Name: Drew Melton                          Call Back Number: [phone] (home) [phone] (work)        How would the patient prefer to be contacted with a response: Phone call do NOT leave a detailed message    LVM: stating that stomache was very bloated and what could be done. Please advise.    Phone Number Called: [phone] (home) [phone] (work)      Call outcome: Spoke to patient regarding message below.    Message: relayed PCP advise regarding trying a lactose free diet for 5-7 days and to please go to UC/ER with worsening symptoms and concerns. Pt stated understanding.

## 2022-04-21 ENCOUNTER — ANTICOAGULATION VISIT (OUTPATIENT)
Dept: VASCULAR LAB | Facility: MEDICAL CENTER | Age: 67
End: 2022-04-21
Attending: INTERNAL MEDICINE
Payer: MEDICARE

## 2022-04-21 DIAGNOSIS — Z79.01 CHRONIC ANTICOAGULATION: ICD-10-CM

## 2022-04-21 LAB
INR BLD: 2.6 (ref 0.9–1.2)
INR PPP: 2.6 (ref 2–3.5)

## 2022-04-21 PROCEDURE — 99211 OFF/OP EST MAY X REQ PHY/QHP: CPT

## 2022-04-21 PROCEDURE — 85610 PROTHROMBIN TIME: CPT

## 2022-04-21 NOTE — PROGRESS NOTES
Anticoagulation Summary  As of 2022    INR goal:  2.0-3.0   TTR:  71.9 % (6.8 y)   INR used for dosin.60 (2022)   Warfarin maintenance plan:  2.5 mg (2.5 mg x 1) every day   Weekly warfarin total:  17.5 mg   Plan last modified:  GAMAL Nash (2021)   Next INR check:  2022   Target end date:  Indefinite    Indications    Chronic anticoagulation [Z79.01]  Deep vein thrombosis [453.40] (Resolved) [I82.409]  Stroke [434.91] (Resolved) [I63.9]  Deep vein thrombosis (DVT) (HCC) (Resolved) [I82.409]             Anticoagulation Episode Summary     INR check location:  Anticoagulation Clinic    Preferred lab:      Send INR reminders to:      Comments:        Anticoagulation Care Providers     Provider Role Specialty Phone number    Jamari Platt M.D. Referring Family Medicine [phone]    [facility]   [phone]                Refer to Patient Findings for HPI:  Patient Findings     Negatives:  Signs/symptoms of thrombosis, Signs/symptoms of bleeding, Laboratory test error suspected, Change in health, Change in alcohol use, Change in activity, Upcoming invasive procedure, Emergency department visit, Upcoming dental procedure, Missed doses, Extra doses, Change in medications, Change in diet/appetite, Hospital admission, Bruising, Other complaints           pt declined vitals    Verified current warfarin dosing schedule.    Medications reconciled   Pt is on 81 mg ASA, not clearly indicated.  Last anticoagulation note reports pt would speak with PCP regarding ASA.  I'll send a message to PCP to see if ASA can be discontinued.        A/P   INR  therapeutic.     Warfarin dosing recommendation: Pt is to continue with current warfarin dosing regimen.     Pt educated to contact our clinic with any changes in medications or s/s of bleeding or thrombosis. Pt is aware to seek immediate medical attention for falls, head injury or deep cuts.    Follow up appointment in  12 week(s).    Markus Salazar, PharmD

## 2022-04-21 NOTE — Clinical Note
HI Dr Lindo-  [place] pt on warfarin for hx of stroke and DVT.  He is on ASA, but no known cardiac procedures in the past year.  I'd like to D/C the ASA since he fully anticoagulated with warfarin if you agree.   Thanks! Markus Salazar, PharmD

## 2022-05-02 ENCOUNTER — TELEPHONE (OUTPATIENT)
Dept: MEDICAL GROUP | Age: 67
End: 2022-05-02

## 2022-05-02 NOTE — TELEPHONE ENCOUNTER
VOICEMAIL  1. Caller Name: Drew Melton                        Call Back Number: [phone] (home) [phone] (work)      2. Message: pt has a cold and would like to know what OTC medication can be taken to help with symptoms  Please advise    3. Patient approves office to leave a detailed voicemail/MyChart message: N\A        
No

## 2022-05-04 NOTE — PROGRESS NOTES
[facility]    Instructed pt to D/C ASA unless another provider suggests he should be on it.  For now we will reduce bleeding risks with warfarin + ASA per our physician suggestion.  Medication profile updated.     Markus Salazar, PharmD

## 2022-05-13 DIAGNOSIS — E78.5 DYSLIPIDEMIA (HIGH LDL; LOW HDL): Chronic | ICD-10-CM

## 2022-05-15 RX ORDER — FENOFIBRATE 48 MG/1
TABLET, COATED ORAL
Qty: 90 TABLET | Refills: 0 | Status: SHIPPED | OUTPATIENT
Start: 2022-05-15 | End: 2022-08-13

## 2022-07-14 ENCOUNTER — ANTICOAGULATION VISIT (OUTPATIENT)
Dept: VASCULAR LAB | Facility: MEDICAL CENTER | Age: 67
End: 2022-07-14
Attending: INTERNAL MEDICINE
Payer: MEDICARE

## 2022-07-14 DIAGNOSIS — Z79.01 CHRONIC ANTICOAGULATION: ICD-10-CM

## 2022-07-14 LAB
INR BLD: 3.8 (ref 0.9–1.2)
INR PPP: 3.8 (ref 2–3.5)

## 2022-07-14 PROCEDURE — 99212 OFFICE O/P EST SF 10 MIN: CPT

## 2022-07-14 PROCEDURE — 85610 PROTHROMBIN TIME: CPT

## 2022-07-14 NOTE — PROGRESS NOTES
OP Anticoagulation Service Note    Date: 7/14/2022  There were no vitals filed for this visit.      Anticoagulation Summary  As of 7/14/2022    INR goal:  2.0-3.0   TTR:  70.7 % (7.1 y)   INR used for dosing:  3.80 (7/14/2022)   Warfarin maintenance plan:  2.5 mg (2.5 mg x 1) every day   Weekly warfarin total:  17.5 mg   Plan last modified:  GAMAL Nash (5/20/2021)   Next INR check:  7/28/2022   Target end date:  Indefinite    Indications    Chronic anticoagulation [Z79.01]  Deep vein thrombosis [453.40] (Resolved) [I82.409]  Stroke [434.91] (Resolved) [I63.9]  Deep vein thrombosis (DVT) (HCC) (Resolved) [I82.409]             Anticoagulation Episode Summary     INR check location:  Anticoagulation Clinic    Preferred lab:      Send INR reminders to:      Comments:        Anticoagulation Care Providers     Provider Role Specialty Phone number    Jamari Platt M.D. Referring Family Medicine [phone]    [facility]   [phone]            HPI:   Drew Melton seen in clinic today, on anticoagulation therapy with warfarin (a high risk medication) for DVT and Stroke, CHADS-VASC = NA      Pt is here today to evaluate anticoagulation therapy    Previous INR was  2.6 on 4/21    Pt was instructed to HOLD tomorrow's dose and continue current regimen.    Anticoagulation Patient Findings  Patient Findings     Positives:  Other complaints (PT complains of blue colored skin on left sholder and upper back. Instructed Pt to call his PCP and make an appointment)    Negatives:  Signs/symptoms of thrombosis, Signs/symptoms of bleeding, Laboratory test error suspected, Change in health, Change in alcohol use, Change in activity, Upcoming invasive procedure, Emergency department visit, Upcoming dental procedure, Missed doses, Extra doses, Change in medications, Change in diet/appetite, Hospital admission, Bruising          Confirmed warfarin dosing regimen    Pt is not on  antiplatelet/NSAID therapy  Pt is not on antiplatelet therapy  Falls or accidents since last visit No        A/P   INR  Supra-therapeutic today, will require dose adjustment today to prevent (bleeding complications or recurrence of thrombosis or stroke) and closer follow up.       Pt educated to contact our clinic with any changes in medications or s/s of bleeding or thrombosis. Pt is aware to seek immediate medical attention for falls, head injury or deep cuts    Follow up appointment in 2 week(s) to reduce risk of adverse events from warfarin    Alisia Beatty, Pharmacy Intern    Ange Link, LyndseyD

## 2022-07-18 ENCOUNTER — OFFICE VISIT (OUTPATIENT)
Dept: MEDICAL GROUP | Age: 67
End: 2022-07-18
Payer: MEDICARE

## 2022-07-18 VITALS
OXYGEN SATURATION: 97 % | DIASTOLIC BLOOD PRESSURE: 88 MMHG | HEART RATE: 86 BPM | TEMPERATURE: 96.9 F | SYSTOLIC BLOOD PRESSURE: 130 MMHG

## 2022-07-18 DIAGNOSIS — R53.81 PHYSICAL DECONDITIONING: ICD-10-CM

## 2022-07-18 DIAGNOSIS — M54.50 CHRONIC LUMBOSACRAL PAIN: ICD-10-CM

## 2022-07-18 DIAGNOSIS — I69.959 HEMIPLEGIA OF DOMINANT SIDE AS LATE EFFECT FOLLOWING CEREBROVASCULAR DISEASE (HCC): ICD-10-CM

## 2022-07-18 DIAGNOSIS — M48.56XS COMPRESSION FRACTURE OF LUMBAR SPINE, NON-TRAUMATIC, SEQUELA: ICD-10-CM

## 2022-07-18 DIAGNOSIS — G89.29 CHRONIC LUMBOSACRAL PAIN: ICD-10-CM

## 2022-07-18 DIAGNOSIS — M48.55XA COLLAPSED VERTEBRA, NOT ELSEWHERE CLASSIFIED, THORACOLUMBAR REGION, INITIAL ENCOUNTER FOR FRACTURE (HCC): ICD-10-CM

## 2022-07-18 DIAGNOSIS — Z89.519 S/P BKA (BELOW KNEE AMPUTATION) UNILATERAL (HCC): ICD-10-CM

## 2022-07-18 PROCEDURE — 99214 OFFICE O/P EST MOD 30 MIN: CPT | Performed by: INTERNAL MEDICINE

## 2022-07-18 NOTE — PROGRESS NOTES
CHIEF COMPLAINT  Chief Complaint   Patient presents with   LBP    HPI  Patient is a 66 y.o. male patient who presents today for the following     Lower back pain / compression fx, hemiplegia, aphasia, deconditioning, s/p BKA RT  - Onset: remote  - Triger: no trauma  - located in: lower back  - intensity:  mild to moderate  - quality:  dull and sharp   - radiation:  no  - alleviating factors are: none  -  exacerbating factors are:  activity  - accompanied:    - c/o numbness bellow knee LT, st post RT BKA   - was on gabapentin  - imagin  - treatment: as above    No reported history of:  - chronic immune suppression, alcoholism, IV drug abuse, indwelling catheter, diabetes.    - No history of recent spinal surgery or injection.    Denies:  - numbness/saddle anesthesia.  - bowel/bladder changes, fever.   - trauma  - nausea/vomiting  - chest pain, shortness of breath, abdominal pain.      Reviewed PMH, PSH, FH, SH, ALL, HCM/IMM, no changes  Reviewed MEDS, no changes    Patient Active Problem List    Diagnosis Date Noted   • Medicare annual wellness visit, subsequent 2020   • Hypovitaminosis D 2020   • S/P BKA (below knee amputation) unilateral (HCC) 2017   • Physical deconditioning 03/15/2017   • Health care maintenance 09/15/2016   • H/O: CVA (cerebrovascular accident) 09/15/2016   • Wheelchair bound 2016   • Chronic anticoagulation 10/08/2013   • Dyslipidemia (high LDL; low HDL) 10/08/2012   • Cognitive deficits as late effect of cerebrovascular disease    • Hemiplegia of dominant side as late effect following cerebrovascular disease (HCC)      CURRENT MEDICATIONS  Current Outpatient Medications   Medication Sig Dispense Refill   • fenofibrate (TRICOR) 48 MG Tab Take 1 tablet by mouth once daily 90 Tablet 0   • potassium chloride SA (KDUR) 20 MEQ Tab CR Take 1 tablet by mouth once daily 90 Tablet 3   • warfarin (COUMADIN) 2.5 MG Tab TAKE 1/2 TO 1 (ONE-HALF TO ONE) TABLET BY MOUTH ONCE  DAILY AS DIRECTED BY THE COUMADIN CLINIC 90 Tablet 4   • atorvastatin (LIPITOR) 20 MG Tab Take 1 tablet by mouth once daily 90 Tablet 3   • Aecns-Pzgvpisf-Icvqj-RsHp-Zn-C (HM IMMUNE SUPPORT COMPLEX PO) Take  by mouth.     • Multiple Vitamins-Minerals (CENTRUM SILVER 50+MEN) Tab Take  by mouth.     • gabapentin (NEURONTIN) 100 MG Cap Take 100 mg by mouth 3 times a day.     • Cholecalciferol (VITAMIN D3) 2000 UNIT CAPS Take 1 Cap by mouth every day. 90 Cap 3     No current facility-administered medications for this visit.     ALLERGIES  Allergies: Patient has no known allergies.  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  SOCIAL HISTORY  Social History     Tobacco Use   • Smoking status: Former Smoker     Types: Cigarettes     Quit date: 2001     Years since quittin.8   • Smokeless tobacco: Never Used   Vaping Use   • Vaping Use: Never used   Substance Use Topics   • Alcohol use: No     Alcohol/week: 0.0 oz     Comment: former abuser   • Drug use: No     Social History     Social History Narrative   • Not on file     FAMILY HISTORY  Family History   Problem Relation Age of Onset   • No Known Problems Mother    • No Known Problems Father    • No Known Problems Maternal Grandmother    • No Known Problems Maternal Grandfather    • No Known Problems Paternal Grandmother    • No Known Problems Paternal Grandfather    • Stroke Neg Hx    •  Diabetes Neg Hx    • Cancer Neg Hx    • Heart Disease Neg Hx    • Hypertension Neg Hx    • Hyperlipidemia Neg Hx      Family Status   Relation Name Status   • Mo     • Fa     • MGMo     • MGFa     • PGMo     • PGFa     • Neg Hx  (Not Specified)     ROS   Constitutional: Negative for fever, chills.  HENT: Negative for congestion, sore throat.  Eyes: Negative for blurred vision.   Respiratory: Negative for cough, shortness of breath.  Cardiovascular: Negative for chest pain, palpitations.   Gastrointestinal: Negative for heartburn, nausea, abdominal pain.   Genitourinary: Negative for incontinence..  Musculoskeletal: as above.  Skin: Negative for rash and itching.   Neuro: Negative for dizziness, weakness and headaches.   Endo/Heme/Allergies: Does not bruise/bleed easily.   Psychiatric/Behavioral: Negative for depression, anxiety    PHYSICAL EXAM   Blood Pressure 130/88 (BP Location: Right arm, Patient Position: Sitting, BP Cuff Size: Adult)   Pulse 86   Temperature 36.1 °C (96.9 °F) (Temporal)   Oxygen Saturation 97%  There is no height or weight on file to calculate BMI.  General:  NAD, in a wheelchair.  HEENT:   NC/AT, PERRLA, EOMI.    Cardiovascular: RRR.   No m/r/g.   Lungs:   CTAB.  Abdomen: NT/ND.  Extremities:  BKA RT. Dorsalis pedis LT wnl.  Skin:  Warm, dry.  No erythema. No rash.   Neurologic: Alert & oriented x 3.  Psychiatric:  Affect normal, mood normal, judgment normal.  MS:  BKA RT.    LABS     Labs are reviewed and discussed with a patient  Lab Results   Component Value Date/Time    CHOLSTRLTOT 118 2022 07:43 AM    LDL 53 2022 07:43 AM    HDL 31 (A) 2022 07:43 AM    TRIGLYCERIDE 171 (H) 2022 07:43 AM       Lab Results   Component Value Date/Time    SODIUM 145 2022 07:43 AM    POTASSIUM 3.9 2022 07:43 AM    CHLORIDE 110 2022 07:43 AM    CO2 24 2022 07:43 AM    GLUCOSE 86 2022 07:43 AM    BUN 29 (H)  01/31/2022 07:43 AM    CREATININE 0.77 01/31/2022 07:43 AM     Lab Results   Component Value Date/Time    ALKPHOSPHAT 56 01/31/2022 07:43 AM    ASTSGOT 26 01/31/2022 07:43 AM    ALTSGPT 23 01/31/2022 07:43 AM    TBILIRUBIN 0.8 01/31/2022 07:43 AM      Lab Results   Component Value Date/Time    HBA1C 5.1 05/14/2018 06:19 AM    HBA1C 4.9 06/13/2017 06:40 AM     No results found for: TSH  No results found for: FREET4    Lab Results   Component Value Date/Time    WBC 5.2 01/31/2022 07:43 AM    RBC 4.40 (L) 01/31/2022 07:43 AM    HEMOGLOBIN 14.0 01/31/2022 07:43 AM    HEMATOCRIT 40.8 (L) 01/31/2022 07:43 AM    MCV 92.7 01/31/2022 07:43 AM    MCH 31.8 01/31/2022 07:43 AM    MCHC 34.3 01/31/2022 07:43 AM    MPV 9.8 01/31/2022 07:43 AM    NEUTSPOLYS 48.70 01/31/2022 07:43 AM    LYMPHOCYTES 38.30 01/31/2022 07:43 AM    MONOCYTES 8.60 01/31/2022 07:43 AM    EOSINOPHILS 3.40 01/31/2022 07:43 AM    BASOPHILS 0.80 01/31/2022 07:43 AM      IMAGING     None    ASSESMENT AND PLAN        1. Chronic lumbosacral pain  He is in a wheelchair, ordered imaging, referral for PT OT, follow-up with him in 1 month.    2. Compression fracture of lumbar spine, non-traumatic, sequela  As above  - DS-BONE DENSITY STUDY (DEXA); Future  - Referral to Physical Therapy  - Referral to Occupational Therapy  - Referral to Physical Medicine Rehab  - DX-LUMBAR SPINE-2 OR 3 VIEWS; Future  3. Collapsed vertebra, not elsewhere classified, thoracolumbar region, initial encounter for fracture (HCC)   - DS-BONE DENSITY STUDY (DEXA); Future  - Referral to Physical Therapy  - Referral to Occupational Therapy  - Referral to Physical Medicine Rehab  4. Hemiplegia of dominant side as late effect following cerebrovascular disease (HCC)  Discussed about safety  - Referral to Physical Therapy  - Referral to Occupational Therapy  - Referral to Physical Medicine Rehab  5. Physical deconditioning  - Referral to Physical Therapy  - Referral to Occupational Therapy  -  Referral to Physical Medicine Rehab  6. S/P BKA (below knee amputation) unilateral (HCC)  - Referral to Physical Therapy  - Referral to Occupational Therapy  - Referral to Physical Medicine Rehab    Counseling:   - Smoking:  Nonsmoker    Followup: Return if symptoms worsen or fail to improve.    All questions are answered.    Please note that this dictation was created using voice recognition software, and that there might be errors of wilberto and possibly content.

## 2022-07-28 ENCOUNTER — ANTICOAGULATION VISIT (OUTPATIENT)
Dept: VASCULAR LAB | Facility: MEDICAL CENTER | Age: 67
End: 2022-07-28
Attending: INTERNAL MEDICINE
Payer: MEDICARE

## 2022-07-28 DIAGNOSIS — Z79.01 CHRONIC ANTICOAGULATION: ICD-10-CM

## 2022-07-28 LAB — INR PPP: 2.6 (ref 2–3.5)

## 2022-07-28 PROCEDURE — 85610 PROTHROMBIN TIME: CPT

## 2022-07-28 PROCEDURE — 99211 OFF/OP EST MAY X REQ PHY/QHP: CPT

## 2022-07-28 NOTE — PROGRESS NOTES
Anticoagulation Summary  As of 2022    INR goal:  2.0-3.0   TTR:  70.5 % (7.1 y)   INR used for dosin.60 (2022)   Warfarin maintenance plan:  2.5 mg (2.5 mg x 1) every day   Weekly warfarin total:  17.5 mg   Plan last modified:  GAMAL Nash (2021)   Next INR check:  2022   Target end date:  Indefinite    Indications    Chronic anticoagulation [Z79.01]  Deep vein thrombosis [453.40] (Resolved) [I82.409]  Stroke [434.91] (Resolved) [I63.9]  Deep vein thrombosis (DVT) (HCC) (Resolved) [I82.409]             Anticoagulation Episode Summary     INR check location:  Anticoagulation Clinic    Preferred lab:      Send INR reminders to:      Comments:        Anticoagulation Care Providers     Provider Role Specialty Phone number    Jamari Platt M.D. Referring Family Medicine [phone]    [facility]   [phone]                Refer to Patient Findings for HPI:  Patient Findings     Negatives:  Signs/symptoms of thrombosis, Signs/symptoms of bleeding, Laboratory test error suspected, Change in health, Change in alcohol use, Change in activity, Upcoming invasive procedure, Emergency department visit, Upcoming dental procedure, Missed doses, Extra doses, Change in medications, Change in diet/appetite, Hospital admission, Bruising, Other complaints          There were no vitals filed for this visit.   pt declined vitals    Verified current warfarin dosing schedule.    Medications reconciled   Pt is not on antiplatelet therapy      A/P   INR  -therapeutic.     Warfarin dosing recommendation: Pt is to continue with current warfarin dosing regimen.      Pt educated to contact our clinic with any changes in medications or s/s of bleeding or thrombosis. Pt is aware to seek immediate medical attention for falls, head injury or deep cuts.    Follow up appointment in 4 week(s).    Hollie Cyr, Pharmacy Intern    Markus Salazar, PharmD

## 2022-07-29 LAB — INR BLD: 2.6 (ref 0.9–1.2)

## 2022-08-01 ENCOUNTER — OFFICE VISIT (OUTPATIENT)
Dept: PHYSICAL MEDICINE AND REHAB | Facility: REHABILITATION | Age: 67
End: 2022-08-01
Payer: MEDICARE

## 2022-08-01 VITALS
RESPIRATION RATE: 16 BRPM | HEIGHT: 72 IN | BODY MASS INDEX: 21.67 KG/M2 | HEART RATE: 86 BPM | SYSTOLIC BLOOD PRESSURE: 130 MMHG | OXYGEN SATURATION: 95 % | DIASTOLIC BLOOD PRESSURE: 62 MMHG | TEMPERATURE: 98.2 F | WEIGHT: 160 LBS

## 2022-08-01 DIAGNOSIS — T87.43 RIGHT BKA INFECTION (HCC): ICD-10-CM

## 2022-08-01 DIAGNOSIS — G81.10 SPASTIC HEMIPARESIS AFFECTING DOMINANT SIDE (HCC): ICD-10-CM

## 2022-08-01 DIAGNOSIS — M54.16 LUMBAR RADICULOPATHY: ICD-10-CM

## 2022-08-01 DIAGNOSIS — S32.030D COMPRESSION FRACTURE OF L3 VERTEBRA WITH ROUTINE HEALING, SUBSEQUENT ENCOUNTER: ICD-10-CM

## 2022-08-01 DIAGNOSIS — Z86.73 HISTORY OF STROKE: Primary | ICD-10-CM

## 2022-08-01 PROCEDURE — 99204 OFFICE O/P NEW MOD 45 MIN: CPT | Performed by: PHYSICAL MEDICINE & REHABILITATION

## 2022-08-01 RX ORDER — VITS A,C,E/LUTEIN/MINERALS 300MCG-200
1 TABLET ORAL DAILY
COMMUNITY
End: 2024-01-16

## 2022-08-01 ASSESSMENT — FIBROSIS 4 INDEX: FIB4 SCORE: 1.98

## 2022-08-01 NOTE — PROGRESS NOTES
[facility]  [facility]  [address] [place] [address]  Ph: [phone]    NEW PATIENT EVALUATION - AMPUTEE CLINIC      Patient Name: Drew Melton   Patient : 1955  Patient Age: 66 y.o.   PCP: Jamari Platt M.D.    SUBJECTIVE:   Patient Identification: Drew Melton is a 66 y.o. RHD male with rehabilitation history significant for right BKA, left CVA secondary to ICA occlusion early , chronic compression fracture L3 vertebral body with associated low back pain and is presenting to PM&R clinic for a NEW OUTPATIENT evaluation with the following chief complaint/s:    Chief Complaint: Amputee    Background Information:  Original Date of Amputation: 2011  Surgeon: Dr. García  Etiology: Venous Thrombosis and Ischemia  Acute Rehab: N  Prosthetist: No prosthetic limb or prosthetist.   Prior Level of Function: Indpendent prior to stroke and amputation  Current Level of Function: Stand and transfer. Mostly in the wheelchair.     Accompanied by Today: Friend.   History of Present Illness:   - Records reviewed: Referring physician notes reviewed in detail.  Patient has complaint of lower back pain, compression fracture history, history of hemiplegia and is s/p right BKA.  He does have numbness below the knee on the left.  Had been on gabapentin.  Patient was referred to PT, OT.  - Equipment: Two wheelchairs which are general transport chairs. One of the new ones he has is about 4 years old.   - Social: Lives with his friend. He is independent ADLs.   - Has some numbness of the left leg. Cannot clearly say if started radiating down first or starting in foot.   - Big toe had a surgery on it.   - Cannot say if leg numbness is painful or not.   - No longer taking Gabapentin.   - States doesn't hurt, but does keep him up at night.     Medication History (pertinent to amputation):   Gabapentin 100mg TID - no longer taking.     Review of Systems:  Unable to obtain  full ROS due to aphasia.     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     RHH   • Unspecified late effects of cerebrovascular disease    • 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      Past Surgical History:   Procedure Laterality Date   • COLONOSCOPY  2018    Procedure: COLONOSCOPY;  Surgeon: Lenin Danielle M.D.;  Location: [facility];  Service: Gastroenterology   • AMPUTATION, BELOW THE KNEE Right    • OTHER      cataract IOLI        Past Social History:  Social History     Socioeconomic History   • Marital status: Single     Spouse name: Not on file   • Number of children: Not on file   • Years of education: Not on file   • Highest education level: Not on file   Occupational History   • Not on file   Tobacco Use   • Smoking status: Former Smoker     Types: Cigarettes     Quit date: 2001     Years since quittin.9   • Smokeless tobacco: Never Used   Vaping Use   • Vaping Use: Never used   Substance and Sexual Activity   • Alcohol use: No     Alcohol/week: 0.0 oz     Comment: former abuser   • Drug use: No   • Sexual activity: Not Currently   Other Topics Concern   • Not on file   Social History Narrative   • Not on file     Social Determinants of Health     Financial Resource Strain: Not on file   Food Insecurity: Not on file   Transportation Needs: Not on file   Physical Activity: Not on file   Stress: Not on file   Social Connections: Not on file   Intimate Partner Violence: Not on file   Housing Stability: Not on file        Family History:  Family History   Problem Relation Age of Onset   • No Known Problems Mother    • No Known Problems Father    • No  Known Problems Maternal Grandmother    • No Known Problems Maternal Grandfather    • No Known Problems Paternal Grandmother    • No Known Problems Paternal Grandfather    • Stroke Neg Hx    • Diabetes Neg Hx    • Cancer Neg Hx    • Heart Disease Neg Hx    • Hypertension Neg Hx    • Hyperlipidemia Neg Hx        Depression and Opioid Screening  PHQ-9:  Depression Screen (PHQ-2/PHQ-9) 7/30/2020 2/11/2021 2/8/2022   PHQ-2 Total Score - - -   PHQ-2 Total Score - - -   PHQ-2 Total Score 0 0 0     Interpretation of PHQ-9 Total Score   Score Severity   1-4 No Depression   5-9 Mild Depression   10-14 Moderate Depression   15-19 Moderately Severe Depression   20-27 Severe Depression     Opioid Risk Score: No value filed.  Interpretation of Opioid Risk Score   Score 0-3 = Low risk of abuse. Do UDS at least once per year.  Score 4-7 = Moderate risk of abuse. Do UDS 1-4 times per year.  Score 8+ = High risk of abuse. Refer to specialist.      OBJECTIVE:   Vital Signs:  Vitals:    08/01/22 1545   BP: 130/62   Pulse: 86   Resp: 16   Temp: 36.8 °C (98.2 °F)   SpO2: 95%        Physical Exam:   GEN: No apparent distress  HEENT: Head normocephalic, atraumatic.  Sclera nonicteric bilaterally, no ocular discharge appreciated bilaterally.  CV: Extremities warm and well-perfused, no peripheral edema appreciated bilaterally.  PULMONARY: Breathing nonlabored on room air, no respiratory accessory muscle use.  Not requiring supplemental oxygen.  ABD: Soft, nontender.  SKIN: No appreciable skin breakdown on exposed areas of skin.  NEURO: Awake alert.  Severe expressive aphasia.     Motor Exam Lower Extremities  ? Myotome R L   Hip flexion L2 4 5   Knee extension L3 Contracted 5   Ankle dorsiflexion L4 BKA 5   Toe extension L5 BKA 5   Ankle plantarflexion S1 BKA 5     2+/forced reflex at L4, S1  No clonus appreciated  Presents in manual wheelchair.  Significant spasticity right upper extremity in tricep, wrist flexors, pronator, finger flexors.   3/4 at least, some areas are 4/4 on modified Guy scale.    PSYCH: Mood and affect within normal limits.  MSK: Flexion contracture at the knee right BKA very little passive or active movement.    Imaging:   X-ray lumbar spine 3/13/2019  1.  Sigmoid scoliosis of lumbar spine.  2.  Wedge-shaped deformity of the L3 vertebral body consistent with compression fracture of indeterminate age. Recommend MRI scan of lumbar spine for further evaluation.    ASSESSMENT/PLAN: Drew Melton  is a 66 y.o. male with rehabilitation history significant for right BKA, left CVA secondary to ICA occlusion early 2000's, chronic compression fracture L3 vertebral body with associated low back pain, here for new amputee evaluation. The following plan was discussed with the patient who is in agreement.     Visit Diagnoses     ICD-10-CM   1. History of stroke  Z86.73   2. Spastic hemiparesis affecting dominant side ([facility])  G81.10   3. Right BKA infection ([facility])  T87.43   4. Compression fracture of L3 vertebra with routine healing, subsequent encounter  S32.030D   5. Lumbar radiculopathy   M54.16        Friend, Eris, assists with history  History and subjective portions of exam difficult to ascertain due to patient's expressive aphasia even with friend assisting with history.    Rehab/Ortho/Neuro:   1. Right BKA  -Records reviewed as aforementioned in the HPI.   -Desired and ability to use prosthesis: I do not think patient has the ability to use a prosthetic limb given that he has significant flexion contracture at the knee.  I do not think use of a prosthetic with him would benefit him for transfers as he is already doing so independently with left lower extremity.  He is independent of his ADLs.  Additionally, given that he has right hemiparesis due to his stroke I think this would significantly limit his ability to use a lower limb prosthetic successfully.  -K level: K0  -Primary care refer to PT/OT    2. Left CVA secondary to  ICA occlusion early 2000's   3. Right hemiparesis  4. Aphasia  -Primary care referred to PT/OT    -Face-to-face: Patient is wheelchair confined when out of bed. Patient needs custom light weight manual wheelchair for safe mobility and ADL at home.  Patient's primary form of locomotion is his wheelchair given that he had a stroke with right hemiparesis and right BKA without potential to functionally ambulate with a prosthetic limb.  He is a primary wheelchair user and would benefit from custom lightweight chair for joint preservation over time.  -Referral to physical therapy for wheelchair evaluation in addition to PT/OT for general rehab.    5. Chronic compression fracture L3 vertebral body with associated low back pain  6.  Paresthesia left lower extremity: Patient reports numbness distal left lower extremity but is very difficult to tell whether this is painful numbness or not.  Had been on gabapentin.  Reports he is no longer taking.  - Continue to monitor for neuropathic pain.  - From what I can gather there are no red flag signs or symptoms though he is having new paresthesias of the left lower extremity.  I have ordered an MRI of the lumbar spine to ensure that he does not have a radicular process occurring given that this could significantly alter the function of his 1 functioning limb.    Spasticity: Significant spasticity of his right upper extremity.  Could be a component of his right knee flexion contracture as well however he is definitely contracted at this point.  - Counseled on the use of Botox to assist with comfort and hygiene for the right upper extremity given that his digits are stuck in flexion.  Counseled I would advise against oral agents due to the fact that he had a stroke and already has baseline cognitive impairments.  Agree with referral to PT/OT.  Patient's friend is his medical power of .  Discussed spasticity management with him in great detail today.  We will readdress at next  visit.    Mood/Sleep: Reports he is sleeping well.    Skin: Denies any skin breakdown.      Follow up: 10 weeks for reevaluation    Thank you to the referring practitioner for the ability to assist with co-management of this patient. If there are any questions or concerns, please do not hesitate to contact me.     Total time spent was 46 minutes.  Included in this time is the time spent preparing for the visit including record review, my exam and evaluation, counseling and education regarding that which is aforementioned in the assessment and plan.  Time was spent documenting into patient's electronic health record.  Time was spent ordering the appropriate labs, tests, procedures, referrals, medications. Included this time as the time spent obtaining history from someone other than the patient.  Some of the time included occurred outside of charting time.  Discussion involved the patient and friend.    Please note that this dictation was created using voice recognition software. I have made every reasonable attempt to correct obvious errors but there may be errors of grammar and content that I may have overlooked prior to finalization of this note.    Dr. Starr Jones DO, MS  Department of Physical Medicine & Rehabilitation  Neuro Rehabilitation Clinic  [facility]

## 2022-08-03 ENCOUNTER — OCCUPATIONAL THERAPY (OUTPATIENT)
Dept: OCCUPATIONAL THERAPY | Facility: REHABILITATION | Age: 67
End: 2022-08-03
Attending: INTERNAL MEDICINE
Payer: MEDICARE

## 2022-08-03 DIAGNOSIS — I69.959 HEMIPLEGIA OF DOMINANT SIDE AS LATE EFFECT FOLLOWING CEREBROVASCULAR DISEASE (HCC): ICD-10-CM

## 2022-08-03 DIAGNOSIS — R53.81 PHYSICAL DECONDITIONING: ICD-10-CM

## 2022-08-03 PROCEDURE — 97110 THERAPEUTIC EXERCISES: CPT

## 2022-08-03 PROCEDURE — 97165 OT EVAL LOW COMPLEX 30 MIN: CPT

## 2022-08-03 SDOH — ECONOMIC STABILITY: GENERAL: QUALITY OF LIFE: GOOD

## 2022-08-03 ASSESSMENT — ENCOUNTER SYMPTOMS
PAIN SCALE AT HIGHEST: 6
QUALITY: ACHING
ALLEVIATING FACTORS: REST
EXACERBATED BY: ACTIVITY
PAIN TIMING: CONSTANT
PAIN LOCATION: LEFT LOWER EXTREMITY
PAIN SCALE AT LOWEST: 6
ALLEVIATING FACTORS: PAIN MEDICATION
PAIN SCALE: 6

## 2022-08-03 NOTE — OP THERAPY EVALUATION
Outpatient Occupational Therapy  INITIAL EVALUATION    [facility] Occupational Therapy [address]  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 Physical deconditioning  R53.81   2. Hemiplegia of dominant side as late effect following cerebrovascular disease (HCC)  I69.959       Subjective:   History of Present Illness:     Date of onset:  8/3/2002    Mechanism of injury:  As per MD notes on 8/1/22:  Patient Identification: Drew Melton is a 66 y.o. 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 García  Etiology: Venous Thrombosis and Ischemia  Acute Rehab: N  Prosthetist: No prosthetic limb or prosthetist.   Prior Level of Function: Indpendent prior to stroke and amputation  Current Level of Function:  Stand and transfer. Mostly in the wheelchair.      Accompanied by Today: Friend.   History of Present Illness:   - Records reviewed: Referring physician notes reviewed in detail.  Patient has complaint of lower back pain, compression fracture history, history of hemiplegia and is s/p right BKA.  He does have numbness below the knee on the left.  Had been on gabapentin.  Patient was referred to PT, OT.  - Equipment: Two wheelchairs which are general transport chairs. One of the new ones he has is about 4 years old.   - Social: Lives with his friend. He is independent ADLs.   - Has some numbness of the left leg. Cannot clearly say if started radiating down first or starting in foot.   - Big toe had a surgery on it.   - Cannot say if leg numbness is painful or not.   - No longer taking Gabapentin.   - States doesn't hurt, but does keep him up at night.      Medication History (pertinent to amputation):   Gabapentin 100mg TID - no longer taking.      Review of Systems:  Unable to obtain full ROS due to aphasia.      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  Has a room-mate, Eris, who is his caregiver  Headaches:  no headaches  Ear problems: none  Sleep disturbance:  Not disrupted  Pain:     Current pain ratin    At best pain ratin    At worst pain ratin    Location:  Left lower extremity     Quality:  Aching    Pain timing:  Constant    Relieving factors:  Pain  medication and rest    Aggravating factors:  Activity    Progression:  Worsening  Social Support:     Patient lives at: mobile home with ramp access.    Lives with: Friend , Eris.  Hand dominance:  Right  Diagnostic Tests:     None      Diagnostic Tests Comments:  No recent tests   Treatments:     Previous treatment:  Physical therapy, occupational therapy and speech therapy    Treatment Comments:  Referred to OT for general deconditioning and right UE impairement.  Awaiting PT evaluation.   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     RHH   • Stroke (HCC) 2001    Right sided paralysis   • Unspecified late effects of cerebrovascular disease    • 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     Past Surgical History:   Procedure Laterality Date   • COLONOSCOPY  2018    Procedure: COLONOSCOPY;  Surgeon: Lenin Danielle M.D.;  Location: SURGERY [facility];  Service: Gastroenterology   • AMPUTATION, BELOW THE KNEE Right    • OTHER      cataract IOLI     Social History     Tobacco Use   • Smoking status: Former Smoker     Types: Cigarettes     Quit date: 2001     Years since quittin.9   • Smokeless tobacco: Never Used   Substance Use Topics   • Alcohol use: No     Alcohol/week: 0.0 oz     Comment: former abuser     Family and Occupational History     Socioeconomic History   • Marital status: Single     Spouse name: Not on file   • Number of children: Not on file   • Years of education: Not on file   • Highest education level: Not on file   Occupational History   • Not on file       Objective     Passive Range of Motion      Right Shoulder   Flexion: 90 degrees   Extension: 30 degrees   Abduction: 60 degrees   Adduction: WFL  External rotation 0°: 10 degrees   Horizontal abduction: 20 degrees   Horizontal adduction: 40 degrees     Right Elbow   Extension: WFL  Flexion: WFL  Forearm supination: 5 degrees   Forearm pronation: WFL    Right Wrist   Wrist flexion: WFL  Wrist extension: 60 degrees   Radial deviation: WFL  Ulnar deviation: WFL     Right Thumb   Normal passive range of motion    Right Digits     Index finger       PIP (extension/flexion): 85 / 95    MIddle finger       PIP (extension/flexion): 85 / 95    Ring finger       PIP (extension/flexion): 85 / 95    Little finger        PIP (extension/flexion): 85 / 95    Additional Passive Range of Motion Details  Joint contractures of all PIPs of right hand    Scapular Mobility     Right Shoulder   Scapular mobility: fair    Strength:      Right Shoulder   Planes of Motion   Flexion: 1   Extension: 1   Abduction: 0   Adduction: 0   External rotation at 0°: 0   Internal rotation at 0°: 0   Horizontal abduction: 0   Horizontal adduction: 0   Isolated Muscles   Levator scapulae: 1   Triceps: 1     Right Elbow   Flexion: 1  Extension: 1  Forearm supination: 0  Forearm pronation: 0    Right Wrist/Hand   Wrist extension: 0  Wrist flexion: 0  Radial deviation: 0  Ulnar deviation: 0    Activities of Daily Living:     ADL Comments:  Mod I with toileting, dressing and bathing.  Has a shower chair at home and is independent with transfers.  W/C user and not a candidate for prosthetic due to knee contracture and weakness in right side.  Can make his own tea and use microwave.  Difficulty making sandwiches and opening jars.  Uses microwave meals deep fryer to make fries.          Therapeutic Exercises (CPT 45048):     1. PROM of R shoulder from caregiver, 5 x for each exercise.     2. Self ROM of right UE , 5 x for each exercise    Therapeutic Exercise Summary:  Initiated HEP with written,  verbal and visual instruction for both patient and his caregiver, Eris.  Also instructed on using a hand roll for right hand to stop fingers from digging into hand and enhancing access for nail care and hand hygiene.          Time-based treatments/modalities:    Occupational Therapy Timed Treatment Charges  Therapeutic exercise minutes (CPT 65428): 15 minutes      Assessment, Response and Plan:   Impairments: abnormal or restricted ROM, activity intolerance, impaired functional mobility, impaired physical strength, lacks appropriate home exercise program and limited ADL's    Assessment details:  Patient is a 67 y/o male about 20 years post R BKA and CVA-Right hemiparesis.  Patient is w/c bound, non-ambulatory and has a room-mate who provides assistance as needed.  Patient referred to OT due to generalized weakness and right upper extremity impairment.  Patient presenting with limited PROM of right upper extremity and contractures of digits causing difficulty accessing hand for nail care and hygiene.  Patient also having difficulty with some kitchen tasks as he is relying only on left non-dominant hand.  Patient would benefit from OT intervention to enhance PROM of right U E to minimize burden of care and also enhance level of function through AE and compensatory techniques.    Barriers to therapy:  None  Prognosis: good    Goals:   Short Term Goals:   Patient and care giver will be independent with HEP  Patient will be min A making a sandwich using compensatory techniques and use of AE  Patient will be min A opening a bottle/jar using compensatory techniques and use of AE  Patient will increase PROM of right shoulder to 100 degrees of flexion and abduction to enhance ease of UE dressing and hygiene tasks  Short term goal timespan:  2-4 weeks    Long Term Goals:   Patient will be mod I making a sandwich using compensatory techniques and use of AE  Patient will be mod I opening a bottle/jar using compensatory  techniques and use of AE  Patient will increase PROM of right shoulder to 110 degrees of flexion and abduction to enhance ease of UE dressing and hygiene tasks  Patient will improve PROM of right digits to -50 degrees extension to enhance ability to access hand for completion of hand hygiene and nail care.  Patient will score at least 60/80 on the UEFI  Long term goal timespan:  6-8 weeks    Plan:   Therapy options:  Occupational therapy treatment to continue  Planned therapy interventions:  Neuromuscular Re-education (CPT 70826), Self Care ADL Training (CPT 01003), Therapeutic Activities (CPT 42430) and Therapeutic Exercise (CPT 58966)  Frequency:  1x week  Duration in weeks:  8  Duration in visits:  8  Discussed with:  Patient and caregiver      Functional Assessment Used        Referring provider co-signature:  I have reviewed this plan of care and my co-signature certifies the need for services.    Certification Period: 08/03/2022 to  09/28/22    Physician Signature: ________________________________ Date: ______________

## 2022-08-10 ENCOUNTER — OFFICE VISIT (OUTPATIENT)
Dept: MEDICAL GROUP | Age: 67
End: 2022-08-10
Payer: MEDICARE

## 2022-08-10 ENCOUNTER — OCCUPATIONAL THERAPY (OUTPATIENT)
Dept: OCCUPATIONAL THERAPY | Facility: REHABILITATION | Age: 67
End: 2022-08-10
Attending: INTERNAL MEDICINE
Payer: MEDICARE

## 2022-08-10 VITALS
OXYGEN SATURATION: 99 % | BODY MASS INDEX: 21.67 KG/M2 | TEMPERATURE: 97 F | DIASTOLIC BLOOD PRESSURE: 60 MMHG | HEIGHT: 72 IN | SYSTOLIC BLOOD PRESSURE: 120 MMHG | HEART RATE: 88 BPM | WEIGHT: 160 LBS

## 2022-08-10 DIAGNOSIS — I69.959 HEMIPLEGIA OF DOMINANT SIDE AS LATE EFFECT FOLLOWING CEREBROVASCULAR DISEASE (HCC): ICD-10-CM

## 2022-08-10 DIAGNOSIS — E78.5 DYSLIPIDEMIA (HIGH LDL; LOW HDL): Chronic | ICD-10-CM

## 2022-08-10 DIAGNOSIS — I69.919 COGNITIVE DEFICITS AS LATE EFFECT OF CEREBROVASCULAR DISEASE: ICD-10-CM

## 2022-08-10 DIAGNOSIS — Z12.5 SCREENING FOR MALIGNANT NEOPLASM OF PROSTATE: ICD-10-CM

## 2022-08-10 DIAGNOSIS — Z86.73 H/O: CVA (CEREBROVASCULAR ACCIDENT): ICD-10-CM

## 2022-08-10 DIAGNOSIS — R53.81 PHYSICAL DECONDITIONING: ICD-10-CM

## 2022-08-10 DIAGNOSIS — Z79.01 CHRONIC ANTICOAGULATION: ICD-10-CM

## 2022-08-10 DIAGNOSIS — D64.9 ANEMIA, UNSPECIFIED TYPE: ICD-10-CM

## 2022-08-10 PROCEDURE — 99214 OFFICE O/P EST MOD 30 MIN: CPT | Performed by: INTERNAL MEDICINE

## 2022-08-10 PROCEDURE — 97110 THERAPEUTIC EXERCISES: CPT

## 2022-08-10 ASSESSMENT — FIBROSIS 4 INDEX: FIB4 SCORE: 1.98

## 2022-08-10 NOTE — OP THERAPY DAILY TREATMENT
Outpatient Occupational Therapy  DAILY TREATMENT     [facility] Occupational [address].  [address]  [place] [address]  Phone:  [phone]  Fax:  [phone]    Date: 08/10/2022    Patient: Drew Melton  YOB: 1955  MRN: [id number]     Time Calculation  Start time: 0145  Stop time: 0230 Time Calculation (min): 45 minutes         Chief Complaint: Extremity Weakness and Self Care Duties    Visit #: 2    SUBJECTIVE:  I am doing my exercises but not every day    OBJECTIVE:  Current objective measures:   Passive Range of Motion      Right Shoulder   Flexion: 95 degrees   Extension: 40 degrees   Abduction: 80 degrees   Adduction: WFL  External rotation 0°: 10 degrees   Horizontal abduction: 45degrees   Horizontal adduction: 45 degrees      Right Elbow   Extension: WFL  Flexion: WFL  Forearm supination: 10 degrees   Forearm pronation: WFL     Right Wrist   Wrist flexion: WFL  Wrist extension: 80 degrees -WFL  Radial deviation: WFL  Ulnar deviation: WFL      Right Thumb   Normal passive range of motion     Right Digits     Index finger       PIP (extension/flexion): 70 / 95    MIddle finger       PIP (extension/flexion): 75 / 95    Ring finger       PIP (extension/flexion): 85 / 95    Little finger        PIP (extension/flexion): 85 / 95    Additional Passive Range of Motion Details  Joint contractures of all PIPs of right hand     Scapular Mobility      Right Shoulder   Scapular mobility: fair     Strength:      Right Shoulder   Planes of Motion   Flexion: 1   Extension: 1   Abduction: 0   Adduction: 0   External rotation at 0°: 0   Internal rotation at 0°: 0   Horizontal abduction: 0   Horizontal adduction: 0   Isolated Muscles   Levator scapulae: 2+  Triceps: 1      Right Elbow   Flexion: 1  Extension: 1  Forearm supination: 0  Forearm pronation: 0     Right Wrist/Hand   Wrist extension: 0  Wrist flexion: 0  Radial deviation: 0  Ulnar deviation: 0     Activities of Daily  Living:      ADL Comments:  Mod I with toileting, dressing and bathing.  Has a shower chair at home and is independent with transfers.  W/C user and not a candidate for prosthetic due to knee contracture and weakness in right side.  Can make his own tea and use microwave.  Difficulty making sandwiches and opening jars.  Uses microwave meals deep fryer to make fries.         Therapeutic Exercises (CPT 33886):     1. PROM of right upper extremity completed by OTR    2. Reviewed HEP set up on initial evaluation.  Patient and caregiver, Eris, are independent and carrying out every other day    Therapeutic Exercise Summary:    Eris made a cone style splint for patient instead of using face clothes.  Patient able to krista/doff    Time-based treatments/modalities:  Therapeutic exercise minutes (CPT 32097): 45 minutes        Pain rating before treatment: 0  Pain rating after treatment: 3  Right shoulder  ASSESSMENT:   Response to treatment: improved PROM of right upper extremity.  Encouraged to carry out HEP daily if possible     PLAN/RECOMMENDATIONS:   Plan for treatment: therapy treatment to continue next visit.  Planned interventions for next visit: continue with current treatment, self care ADL training (CPT 63980), therapeutic activities (CPT 01460), and therapeutic exercise (CPT 52453)

## 2022-08-10 NOTE — PROGRESS NOTES
CHIEF COMPLAINT  Chief Complaint   Patient presents with    Medication Management    Dyslipidemia     HPI  Drew Melton is a 66 y.o. male who presents today for the following chronic medical conditions  Dyslipidemia: Chronic medical condition, on atorvastatin 20 mg daily, fenofibrate 48 mg daily  History of CVA, hemiplegia, chronic anticoagulation, cognitive deficit. Remote CVA with listed consequences, in a wheelchair.  Anemia.  He had positive FIT test, referred to GI in February 2022, advised to schedule appointment, follow-up labs.    Reviewed PMH, PSH, FH, SH, ALL, HCM/IMM, no changes  Reviewed MEDS, no changes    Patient Active Problem List    Diagnosis Date Noted    Medicare annual wellness visit, subsequent 07/30/2020    Hypovitaminosis D 01/30/2020    S/P BKA (below knee amputation) unilateral (HCC) 06/21/2017    Physical deconditioning 03/15/2017    Health care maintenance 09/15/2016    H/O: CVA (cerebrovascular accident) 09/15/2016    Wheelchair bound 03/18/2016    Chronic anticoagulation 10/08/2013    Dyslipidemia (high LDL; low HDL) 10/08/2012    Cognitive deficits as late effect of cerebrovascular disease     Hemiplegia of dominant side as late effect following cerebrovascular disease (HCC)      CURRENT MEDICATIONS  Current Outpatient Medications   Medication Sig Dispense Refill    Multiple Vitamins-Minerals (OCUVITE-LUTEIN) Tab Take 1 Tablet by mouth every day.      fenofibrate (TRICOR) 48 MG Tab Take 1 tablet by mouth once daily 90 Tablet 0    potassium chloride SA (KDUR) 20 MEQ Tab CR Take 1 tablet by mouth once daily 90 Tablet 3    warfarin (COUMADIN) 2.5 MG Tab TAKE 1/2 TO 1 (ONE-HALF TO ONE) TABLET BY MOUTH ONCE DAILY AS DIRECTED BY THE COUMADIN CLINIC 90 Tablet 4    atorvastatin (LIPITOR) 20 MG Tab Take 1 tablet by mouth once daily 90 Tablet 3    Mrboq-Nzaudbzv-Vfjwi-RsHp-Zn-C ( IMMUNE SUPPORT COMPLEX PO) Take  by mouth.      Multiple Vitamins-Minerals (CENTRUM SILVER 50+MEN) Tab Take   by mouth.      gabapentin (NEURONTIN) 100 MG Cap Take 100 mg by mouth 3 times a day.      Cholecalciferol (VITAMIN D3) 2000 UNIT CAPS Take 1 Cap by mouth every day. 90 Cap 3     No current facility-administered medications for this visit.     ALLERGIES  Allergies: Patient has no known allergies.  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  SOCIAL HISTORY  Social History     Tobacco Use    Smoking status: Former     Types: Cigarettes     Quit date: 2001     Years since quittin.9    Smokeless tobacco: Never   Vaping Use    Vaping Use: Never used   Substance Use Topics    Alcohol use: No     Alcohol/week: 0.0 oz     Comment: former abuser    Drug use: No     Social History     Social History Narrative    Not on file     FAMILY HISTORY  Family History   Problem Relation Age of Onset    No Known Problems Mother     No Known Problems Father     No Known Problems Maternal Grandmother     No Known Problems Maternal Grandfather     No Known Problems Paternal Grandmother     No Known Problems Paternal Grandfather     Stroke Neg Hx     Diabetes Neg Hx     Cancer Neg Hx     Heart Disease Neg Hx     Hypertension Neg Hx     Hyperlipidemia Neg Hx      Family Status   Relation Name Status    Mo      Fa      MGMo      MGFa      PGMo      PGFa      Neg Hx  (Not Specified)     ROS   Constitutional: Negative for  fever, chills, fatigue.  HENT: Negative for congestion, sore throat.  Eyes: Negative for vision problems.   Respiratory: Negative for cough, shortness of breath.  Cardiovascular: Negative for chest pain, palpitations.   Gastrointestinal: Negative for heartburn, nausea, abdominal pain.   Genitourinary: Negative for dysuria.   Skin: Negative for rash.   Neuro: Negative for dizziness, headaches.   Endo/Heme/Allergies: Does not bruise/bleed easily.   Psychiatric/Behavioral: Negative for depression.    PHYSICAL EXAM   Blood Pressure 120/60 (BP Location: Right arm, Patient Position: Sitting, BP Cuff Size: Small adult)   Pulse 88   Temperature 36.1 °C (97 °F) (Temporal)   Height 1.829 m (6')   Weight 72.6 kg (160 lb)   Oxygen Saturation 99%  Body mass index is 21.7 kg/m².  General:  NAD, in a wheelchair.  HEENT:   NC/AT, PERRLA, EOMI.  Cardiovascular: unlabored breathing, no peripheral cyanosis or swelling.  Lungs:   no respiratory distress.  Abdomen: non- distended.  Extremities:  No LE swelling.  Skin:  Warm, dry.  No erythema. No rash.   Neurologic: Alert & oriented x 3. CN II-XII grossly intact. No focal deficits.  Psychiatric:  Affect normal, mood normal, judgment normal.    Labs     Labs are reviewed and discussed with a patient  Lab Results   Component Value Date/Time    CHOLSTRLTOT 118 01/31/2022 07:43 AM    LDL 53 01/31/2022 07:43 AM    HDL 31 (A) 01/31/2022 07:43 AM    TRIGLYCERIDE 171 (H) 01/31/2022 07:43 AM       Lab Results   Component Value Date/Time    SODIUM 145 01/31/2022 07:43 AM    POTASSIUM 3.9 01/31/2022 07:43 AM    CHLORIDE 110 01/31/2022 07:43 AM    CO2 24 01/31/2022 07:43 AM    GLUCOSE 86 01/31/2022 07:43 AM    BUN 29 (H) 01/31/2022 07:43 AM    CREATININE 0.77 01/31/2022 07:43 AM     Lab Results   Component Value Date/Time    ALKPHOSPHAT 56 01/31/2022 07:43 AM    ASTSGOT 26 01/31/2022 07:43 AM    ALTSGPT 23 01/31/2022 07:43 AM    TBILIRUBIN 0.8 01/31/2022 07:43 AM      Lab Results   Component  Value Date/Time    HBA1C 5.1 05/14/2018 06:19 AM    HBA1C 4.9 06/13/2017 06:40 AM     No results found for: TSH  No results found for: FREET4    Lab Results   Component Value Date/Time    WBC 5.2 01/31/2022 07:43 AM    RBC 4.40 (L) 01/31/2022 07:43 AM    HEMOGLOBIN 14.0 01/31/2022 07:43 AM    HEMATOCRIT 40.8 (L) 01/31/2022 07:43 AM    MCV 92.7 01/31/2022 07:43 AM    MCH 31.8 01/31/2022 07:43 AM    MCHC 34.3 01/31/2022 07:43 AM    MPV 9.8 01/31/2022 07:43 AM    NEUTSPOLYS 48.70 01/31/2022 07:43 AM    LYMPHOCYTES 38.30 01/31/2022 07:43 AM    MONOCYTES 8.60 01/31/2022 07:43 AM    EOSINOPHILS 3.40 01/31/2022 07:43 AM    BASOPHILS 0.80 01/31/2022 07:43 AM      Imaging     None    Assessment and Plan     Drew Melton is a 66 y.o. male    1. Dyslipidemia (high LDL; low HDL)  - Controlled, continue with current management.  - Comp Metabolic Panel; Future  - Lipid Profile; Future    2. H/O: CVA (cerebrovascular accident)  - Controlled BP,  continue with current management.  3. Hemiplegia of dominant side as late effect following cerebrovascular disease (HCC)  4. Chronic anticoagulation  5. Cognitive deficits as late effect of cerebrovascular disease    6. Anemia, unspecified type   Advised to schedule appointment with GI, follow-up labs  - CBC WITH DIFFERENTIAL; Future    7. Screening for malignant neoplasm of prostate  - PROSTATE SPECIFIC AG SCREENING; Future    Followup: Return in about 6 months (around 2/10/2023), or if symptoms worsen or fail to improve.    All questions are answered.    Please note that this dictation was created using voice recognition software, and that there might be errors of wilberto and possibly content.

## 2022-08-12 ENCOUNTER — HOSPITAL ENCOUNTER (OUTPATIENT)
Dept: RADIOLOGY | Facility: MEDICAL CENTER | Age: 67
End: 2022-08-12
Attending: PHYSICAL MEDICINE & REHABILITATION
Payer: MEDICARE

## 2022-08-12 DIAGNOSIS — G81.10 SPASTIC HEMIPARESIS AFFECTING DOMINANT SIDE (HCC): ICD-10-CM

## 2022-08-12 DIAGNOSIS — M54.16 LUMBAR RADICULOPATHY: ICD-10-CM

## 2022-08-12 DIAGNOSIS — Z86.73 HISTORY OF STROKE: ICD-10-CM

## 2022-08-12 DIAGNOSIS — E78.5 DYSLIPIDEMIA (HIGH LDL; LOW HDL): Chronic | ICD-10-CM

## 2022-08-12 DIAGNOSIS — T87.43 RIGHT BKA INFECTION (HCC): ICD-10-CM

## 2022-08-12 PROCEDURE — 72148 MRI LUMBAR SPINE W/O DYE: CPT | Mod: ME

## 2022-08-13 RX ORDER — FENOFIBRATE 48 MG/1
TABLET, COATED ORAL
Qty: 90 TABLET | Refills: 3 | Status: SHIPPED | OUTPATIENT
Start: 2022-08-13 | End: 2022-10-03 | Stop reason: SDUPTHER

## 2022-08-17 ENCOUNTER — OCCUPATIONAL THERAPY (OUTPATIENT)
Dept: OCCUPATIONAL THERAPY | Facility: REHABILITATION | Age: 67
End: 2022-08-17
Attending: INTERNAL MEDICINE
Payer: MEDICARE

## 2022-08-17 DIAGNOSIS — R53.81 PHYSICAL DECONDITIONING: ICD-10-CM

## 2022-08-17 DIAGNOSIS — I69.959 HEMIPLEGIA OF DOMINANT SIDE AS LATE EFFECT FOLLOWING CEREBROVASCULAR DISEASE (HCC): ICD-10-CM

## 2022-08-17 PROCEDURE — 97110 THERAPEUTIC EXERCISES: CPT

## 2022-08-17 PROCEDURE — 97535 SELF CARE MNGMENT TRAINING: CPT

## 2022-08-17 NOTE — OP THERAPY DAILY TREATMENT
Outpatient Occupational Therapy  DAILY TREATMENT     [facility] Occupational [address]  [address]  [place] [address]  Phone:  [phone]  Fax:  [phone]    Date: 08/17/2022    Patient: Drew Melton  YOB: 1955  MRN: [id number]     Time Calculation  Start time: 0800  Stop time: 0845 Time Calculation (min): 45 minutes         Chief Complaint: Extremity Weakness and Self Care Duties    Visit #: 3    SUBJECTIVE:  I am trying to do more stretching    OBJECTIVE:  Current objective measures:   Passive Range of Motion      Right Shoulder   Flexion: 100 degrees   Extension: 40 degrees   Abduction: 80 degrees   Adduction: WFL  External rotation 0°: 10 degrees   Horizontal abduction: 45degrees   Horizontal adduction: 45 degrees      Right Elbow   Extension: WFL  Flexion: WFL  Forearm supination: 10 degrees   Forearm pronation: WFL     Right Wrist   Wrist flexion: WFL  Wrist extension: 80 degrees -WFL  Radial deviation: WFL  Ulnar deviation: WFL      Right Thumb   Normal passive range of motion     Right Digits     Index finger       PIP (extension/flexion): 70 / 95    MIddle finger       PIP (extension/flexion): 75 / 95    Ring finger       PIP (extension/flexion): 85 / 95    Little finger        PIP (extension/flexion): 85 / 95    Additional Passive Range of Motion Details  Joint contractures of all PIPs of right hand     Scapular Mobility      Right Shoulder   Scapular mobility: fair     Strength:      Right Shoulder   Planes of Motion   Flexion: 1   Extension: 1   Abduction: 0   Adduction: 0   External rotation at 0°: 0   Internal rotation at 0°: 0   Horizontal abduction: 0   Horizontal adduction: 0   Isolated Muscles   Levator scapulae: 2+  Triceps: 1      Right Elbow   Flexion: 1  Extension: 1  Forearm supination: 0  Forearm pronation: 0     Right Wrist/Hand   Wrist extension: 0  Wrist flexion: 0  Radial deviation: 0  Ulnar deviation: 0     Activities of Daily  Living:      ADL Comments:  Mod I with toileting, dressing and bathing.  Has a shower chair at home and is independent with transfers.  W/C user and not a candidate for prosthetic due to knee contracture and weakness in right side.  Can make his own tea and use microwave.  Difficulty making sandwiches and opening jars.  Uses microwave meals deep fryer to make fries.         Therapeutic Exercises (CPT 00077):     1. PROM of right upper extremity completed by OTR    2. SROM of R shoulder by patient under supervision of OTR and caregiver., FF and horizontal abduction / adduction    Therapeutic Treatments and Modalities:    1. Self Care ADL Training (CPT 62218)    Therapeutic Treatments and Modalities Summary: Practice placing small medicine bottle in affected right hand and opening/closing cap and lid.  Utilized several smaller sizes and patient required max A placing containers in hands.    Provided information on one handed bread board, vegetable cutting board.  Showed example of one we have here in the OT gym and how to utilize it.  Patient to contact [facility] for assistance  Time-based treatments/modalities:  Therapeutic exercise minutes (CPT 75855): 30 minutes  Self-care/ADL training minutes (CPT 73129): 15 minutes        Pain rating before treatment: 0  Pain rating after treatment: 3  R UE after stretches  ASSESSMENT:   Response to treatment: improving PROM and good hand hygiene.  Recommended work on nail care.      PLAN/RECOMMENDATIONS:   Plan for treatment: therapy treatment to continue next visit.  Planned interventions for next visit: continue with current treatment, neuromuscular re-education (CPT 68130), self care ADL training (CPT 09914), therapeutic activities (CPT 59543), and therapeutic exercise (CPT 98042)

## 2022-08-24 ENCOUNTER — OCCUPATIONAL THERAPY (OUTPATIENT)
Dept: OCCUPATIONAL THERAPY | Facility: REHABILITATION | Age: 67
End: 2022-08-24
Attending: INTERNAL MEDICINE
Payer: MEDICARE

## 2022-08-24 DIAGNOSIS — I69.959 HEMIPLEGIA OF DOMINANT SIDE AS LATE EFFECT FOLLOWING CEREBROVASCULAR DISEASE (HCC): ICD-10-CM

## 2022-08-24 DIAGNOSIS — R53.81 PHYSICAL DECONDITIONING: ICD-10-CM

## 2022-08-24 PROCEDURE — 97110 THERAPEUTIC EXERCISES: CPT

## 2022-08-24 PROCEDURE — 97535 SELF CARE MNGMENT TRAINING: CPT

## 2022-08-24 NOTE — OP THERAPY DAILY TREATMENT
Outpatient Occupational Therapy  DAILY TREATMENT     [facility] Occupational Therapy [address].  [address]  [place] [address]  Phone:  [phone]  Fax:  [phone]    Date: 08/24/2022    Patient: Drew Melton  YOB: 1955  MRN: [id number]     Time Calculation  Start time: 0800  Stop time: 0845 Time Calculation (min): 45 minutes         Chief Complaint: Extremity Weakness and Self Care Duties    Visit #: 4    SUBJECTIVE:  I haven't been doing my stretches that much    OBJECTIVE:  Current objective measures:   Passive Range of Motion      Right Shoulder   Flexion: 100 degrees   Extension: 40 degrees   Abduction: 90 degrees   Adduction: WFL  External rotation 0°: 10 degrees   Horizontal abduction: 45degrees   Horizontal adduction: 45 degrees      Right Elbow   Extension: WFL  Flexion: WFL  Forearm supination: 20 degrees   Forearm pronation: WFL     Right Wrist   Wrist flexion: WFL  Wrist extension: 80 degrees -WFL  Radial deviation: WFL  Ulnar deviation: WFL      Right Thumb   Normal passive range of motion     Right Digits     Index finger       PIP (extension/flexion): 70 / 95    MIddle finger       PIP (extension/flexion): 75 / 95    Ring finger       PIP (extension/flexion): 85 / 95    Little finger        PIP (extension/flexion): 85 / 95    Additional Passive Range of Motion Details  Joint contractures of all PIPs of right hand     Scapular Mobility      Right Shoulder   Scapular mobility: fair     Strength:      Right Shoulder   Planes of Motion   Flexion: 1   Extension: 1   Abduction: 0   Adduction: 0   External rotation at 0°: 0   Internal rotation at 0°: 0   Horizontal abduction: 0   Horizontal adduction: 0   Isolated Muscles   Levator scapulae: 2+  Triceps: 1      Right Elbow   Flexion: 1  Extension: 1  Forearm supination: 0  Forearm pronation: 0     Right Wrist/Hand   Wrist extension: 0  Wrist flexion: 0  Radial deviation: 0  Ulnar deviation: 0     Activities of  Daily Living:      ADL Comments:  Mod I with toileting, dressing and bathing.  Has a shower chair at home and is independent with transfers.  W/C user and not a candidate for prosthetic due to knee contracture and weakness in right side.  Can make his own tea and use microwave.  Difficulty making sandwiches and opening jars.  Uses microwave meals deep fryer to make fries.         Therapeutic Exercises (CPT 72248):     1. PROM of right upper extremity completed by OTR    2. SROM of R shoulder by patient under supervision of OTR and caregiver., FF and horizontal abduction / adduction    Therapeutic Treatments and Modalities:    1. Self Care ADL Training (CPT 00545)    Therapeutic Treatments and Modalities Summary: Practice placing small medicine bottle in affected right hand and opening/closing cap and lid.  Utilized several smaller sizes and patient required max A placing containers in hands.    Max A with clipping nails on right affected hand.  Education and training given to patient and caregiver.  Caregiver has not been able to contact [facility] in last week and OTR provided a pamphlet with information about the organization and contact details.   Time-based treatments/modalities:  Therapeutic exercise minutes (CPT 88855): 30 minutes  Self-care/ADL training minutes (CPT 15862): 15 minutes        Pain rating before treatment: 0  Pain rating after treatment: 2    ASSESSMENT:   Response to treatment: some improvement with PROM. Caregiver stated he has been carrying out PROM daily; however he needs to remind patient to carry out the SROM exercises.  Caregiver stated he will also practice the medicine bottle activity with patient at home.     PLAN/RECOMMENDATIONS:   Plan for treatment: therapy treatment to continue next visit.  Planned interventions for next visit: continue with current treatment, neuromuscular re-education (CPT 46170), self care ADL training (CPT 47287), therapeutic activities (CPT 82499), and  therapeutic exercise (CPT 75662)

## 2022-08-25 ENCOUNTER — ANTICOAGULATION VISIT (OUTPATIENT)
Dept: VASCULAR LAB | Facility: MEDICAL CENTER | Age: 67
End: 2022-08-25
Attending: NURSE PRACTITIONER
Payer: MEDICARE

## 2022-08-25 DIAGNOSIS — Z79.01 CHRONIC ANTICOAGULATION: ICD-10-CM

## 2022-08-25 LAB — INR PPP: 2.4 (ref 2–3.5)

## 2022-08-25 PROCEDURE — 99211 OFF/OP EST MAY X REQ PHY/QHP: CPT

## 2022-08-25 PROCEDURE — 85610 PROTHROMBIN TIME: CPT

## 2022-08-25 NOTE — PROGRESS NOTES
Anticoagulation Summary  As of 2022      INR goal:  2.0-3.0   TTR:  70.8 % (7.2 y)   INR used for dosin.40 (2022)   Warfarin maintenance plan:  2.5 mg (2.5 mg x 1) every day   Weekly warfarin total:  17.5 mg   Plan last modified:  GMAAL Nash (2021)   Next INR check:  2022   Target end date:  Indefinite    Indications    Chronic anticoagulation [Z79.01]  Deep vein thrombosis [453.40] (Resolved) [I82.409]  Stroke [434.91] (Resolved) [I63.9]  Deep vein thrombosis (DVT) (HCC) (Resolved) [I82.409]                 Anticoagulation Episode Summary       INR check location:  Anticoagulation Clinic    Preferred lab:      Send INR reminders to:      Comments:            Anticoagulation Care Providers       Provider Role Specialty Phone number    Jamari Platt M.D. Referring Family Medicine [phone]    [facility] Anticoagulation Services   [phone]                  Refer to Patient Findings for HPI:  Patient Findings       Negatives:  Signs/symptoms of thrombosis, Signs/symptoms of bleeding, Laboratory test error suspected, Change in health, Change in alcohol use, Change in activity, Upcoming invasive procedure, Emergency department visit, Upcoming dental procedure, Missed doses, Extra doses, Change in medications, Change in diet/appetite, Hospital admission, Bruising, Other complaints            There were no vitals filed for this visit.   pt declined vitals    Verified current warfarin dosing schedule.    Medications reconciled         A/P   INR  -therapeutic.     Warfarin dosing recommendation: Continue current warfarin regimen       Pt educated to contact our clinic with any changes in medications or s/s of bleeding or thrombosis. Pt is aware to seek immediate medical attention for falls, head injury or deep cuts.    Follow up appointment in 5 week(s).    Ange Link, LyndseyD

## 2022-08-26 LAB — INR BLD: 2.4 (ref 0.9–1.2)

## 2022-08-31 ENCOUNTER — OCCUPATIONAL THERAPY (OUTPATIENT)
Dept: OCCUPATIONAL THERAPY | Facility: REHABILITATION | Age: 67
End: 2022-08-31
Attending: INTERNAL MEDICINE
Payer: MEDICARE

## 2022-08-31 ENCOUNTER — HOSPITAL ENCOUNTER (OUTPATIENT)
Dept: RADIOLOGY | Facility: MEDICAL CENTER | Age: 67
End: 2022-08-31
Attending: INTERNAL MEDICINE
Payer: MEDICARE

## 2022-08-31 DIAGNOSIS — R53.81 PHYSICAL DECONDITIONING: ICD-10-CM

## 2022-08-31 DIAGNOSIS — M48.55XA COLLAPSED VERTEBRA, NOT ELSEWHERE CLASSIFIED, THORACOLUMBAR REGION, INITIAL ENCOUNTER FOR FRACTURE (HCC): ICD-10-CM

## 2022-08-31 DIAGNOSIS — I69.959 HEMIPLEGIA OF DOMINANT SIDE AS LATE EFFECT FOLLOWING CEREBROVASCULAR DISEASE (HCC): ICD-10-CM

## 2022-08-31 DIAGNOSIS — M48.56XS COMPRESSION FRACTURE OF LUMBAR SPINE, NON-TRAUMATIC, SEQUELA: ICD-10-CM

## 2022-08-31 PROCEDURE — 97110 THERAPEUTIC EXERCISES: CPT

## 2022-08-31 PROCEDURE — 97535 SELF CARE MNGMENT TRAINING: CPT

## 2022-08-31 PROCEDURE — 77080 DXA BONE DENSITY AXIAL: CPT

## 2022-08-31 NOTE — OP THERAPY DISCHARGE SUMMARY
Outpatient Occupational Therapy  DISCHARGE SUMMARY NOTE    [facility] Occupational Therapy [address]  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      Limitations Remaining:  Non functional right UE    Recommendations:  Continue with HEP and purchase adapted bread board/cutting board.    FRANKLIN Smith/L    Date: 8/31/2022

## 2022-08-31 NOTE — OP THERAPY DAILY TREATMENT
Outpatient Occupational Therapy  DAILY TREATMENT     [facility] Occupational Therapy [address]  [address]  [place] [address]  Phone:  [phone]  Fax:  [phone]    Date: 08/31/2022    Patient: Drew Melton  YOB: 1955  MRN: [id number]     Time Calculation  Start time: 0800  Stop time: 0845 Time Calculation (min): 45 minutes         Chief Complaint: Extremity Weakness and Self Care Duties    Visit #: 5    SUBJECTIVE:  My caregiver reminds me to do my exercises    OBJECTIVE:  Current objective measures:   Passive Range of Motion      Right Shoulder   Flexion: 110 degrees   Extension: 40 degrees   Abduction: 95 degrees   Adduction: WFL  External rotation 0°: 20 degrees   Horizontal abduction: 45 degrees   Horizontal adduction: 45 degrees      Right Elbow   Extension: WFL  Flexion: WFL  Forearm supination: 25 degrees   Forearm pronation: WFL     Right Wrist   Wrist flexion: WFL  Wrist extension: 80 degrees -WFL  Radial deviation: WFL  Ulnar deviation: WFL      Right Thumb   Normal passive range of motion     Right Digits     Index finger       PIP (extension/flexion): 70 / 95    MIddle finger       PIP (extension/flexion): 75 / 95    Ring finger       PIP (extension/flexion): 85 / 95    Little finger        PIP (extension/flexion): 85 / 95    Additional Passive Range of Motion Details  Joint contractures of all PIPs of right hand     Scapular Mobility      Right Shoulder   Scapular mobility: fair     Strength:      Right Shoulder   Planes of Motion   Flexion: 1   Extension: 1   Abduction: 0   Adduction: 0   External rotation at 0°: 0   Internal rotation at 0°: 0   Horizontal abduction: 0   Horizontal adduction: 0   Isolated Muscles   Levator scapulae: 2+  Triceps: 1      Right Elbow   Flexion: 1  Extension: 1  Forearm supination: 0  Forearm pronation: 0     Right Wrist/Hand   Wrist extension: 0  Wrist flexion: 0  Radial deviation: 0  Ulnar deviation: 0     Activities of  Daily Living:      ADL Comments:  Mod I with toileting, dressing and bathing.  Has a shower chair at home and is independent with transfers.  W/C user and not a candidate for prosthetic due to knee contracture and weakness in right side.  Can make his own tea and use microwave.  Difficulty making sandwiches and opening jars.  Uses microwave meals deep fryer to make fries.         Therapeutic Exercises (CPT 20800):     1. PROM of right upper extremity completed by OTR    2. SROM of R shoulder by patient under supervision of OTR and caregiver., FF and horizontal abduction / adduction    Therapeutic Treatments and Modalities:    1. Self Care ADL Training (CPT 18331)    Therapeutic Treatments and Modalities Summary: Practice placing small medicine bottle in affected right hand and opening/closing cap and lid.  Utilized several smaller sizes and patient required max A placing containers in hands.    Caregiver has been in touch with [facility] and patient is now in their system.    Time-based treatments/modalities:  Therapeutic exercise minutes (CPT 81165): 30 minutes  Self-care/ADL training minutes (CPT 38867): 15 minutes        Pain rating before treatment: 0  Pain rating after treatment: 0    ASSESSMENT:   Response to treatment:  Patient has reached a plateau in therapy.  To continue with HEP with caregiver support    PLAN/RECOMMENDATIONS:   Plan for treatment: no further treatment needed.  Planned interventions for next visit: Last therapy session

## 2022-09-01 NOTE — RESULT ENCOUNTER NOTE
Please notify pt.  DEXA scan was normal.  Take daily:  - vitamin D   - calcium supplement 1300 mg per day  - weightbearing exercise, such as lifting weights.   Thanks,  Dr. Lindo

## 2022-09-07 ENCOUNTER — APPOINTMENT (OUTPATIENT)
Dept: OCCUPATIONAL THERAPY | Facility: REHABILITATION | Age: 67
End: 2022-09-07
Attending: INTERNAL MEDICINE
Payer: MEDICARE

## 2022-09-13 ENCOUNTER — HOSPITAL ENCOUNTER (OUTPATIENT)
Dept: LAB | Facility: MEDICAL CENTER | Age: 67
End: 2022-09-13
Attending: INTERNAL MEDICINE
Payer: MEDICARE

## 2022-09-13 DIAGNOSIS — E78.5 DYSLIPIDEMIA (HIGH LDL; LOW HDL): Chronic | ICD-10-CM

## 2022-09-13 DIAGNOSIS — Z12.5 SCREENING FOR MALIGNANT NEOPLASM OF PROSTATE: ICD-10-CM

## 2022-09-13 DIAGNOSIS — D64.9 ANEMIA, UNSPECIFIED TYPE: ICD-10-CM

## 2022-09-13 LAB
ALBUMIN SERPL BCP-MCNC: 4.4 G/DL (ref 3.2–4.9)
ALBUMIN/GLOB SERPL: 1.5 G/DL
ALP SERPL-CCNC: 68 U/L (ref 30–99)
ALT SERPL-CCNC: 24 U/L (ref 2–50)
ANION GAP SERPL CALC-SCNC: 13 MMOL/L (ref 7–16)
AST SERPL-CCNC: 33 U/L (ref 12–45)
BASOPHILS # BLD AUTO: 0.9 % (ref 0–1.8)
BASOPHILS # BLD: 0.04 K/UL (ref 0–0.12)
BILIRUB SERPL-MCNC: 1.1 MG/DL (ref 0.1–1.5)
BUN SERPL-MCNC: 21 MG/DL (ref 8–22)
CALCIUM SERPL-MCNC: 9.6 MG/DL (ref 8.5–10.5)
CHLORIDE SERPL-SCNC: 108 MMOL/L (ref 96–112)
CHOLEST SERPL-MCNC: 131 MG/DL (ref 100–199)
CO2 SERPL-SCNC: 23 MMOL/L (ref 20–33)
CREAT SERPL-MCNC: 0.83 MG/DL (ref 0.5–1.4)
EOSINOPHIL # BLD AUTO: 0.21 K/UL (ref 0–0.51)
EOSINOPHIL NFR BLD: 4.8 % (ref 0–6.9)
ERYTHROCYTE [DISTWIDTH] IN BLOOD BY AUTOMATED COUNT: 49.5 FL (ref 35.9–50)
FASTING STATUS PATIENT QL REPORTED: NORMAL
GFR SERPLBLD CREATININE-BSD FMLA CKD-EPI: 97 ML/MIN/1.73 M 2
GLOBULIN SER CALC-MCNC: 2.9 G/DL (ref 1.9–3.5)
GLUCOSE SERPL-MCNC: 74 MG/DL (ref 65–99)
HCT VFR BLD AUTO: 41.8 % (ref 42–52)
HDLC SERPL-MCNC: 28 MG/DL
HGB BLD-MCNC: 13.8 G/DL (ref 14–18)
IMM GRANULOCYTES # BLD AUTO: 0.02 K/UL (ref 0–0.11)
IMM GRANULOCYTES NFR BLD AUTO: 0.5 % (ref 0–0.9)
LDLC SERPL CALC-MCNC: 73 MG/DL
LYMPHOCYTES # BLD AUTO: 1.48 K/UL (ref 1–4.8)
LYMPHOCYTES NFR BLD: 33.6 % (ref 22–41)
MCH RBC QN AUTO: 31.3 PG (ref 27–33)
MCHC RBC AUTO-ENTMCNC: 33 G/DL (ref 33.7–35.3)
MCV RBC AUTO: 94.8 FL (ref 81.4–97.8)
MONOCYTES # BLD AUTO: 0.37 K/UL (ref 0–0.85)
MONOCYTES NFR BLD AUTO: 8.4 % (ref 0–13.4)
NEUTROPHILS # BLD AUTO: 2.28 K/UL (ref 1.82–7.42)
NEUTROPHILS NFR BLD: 51.8 % (ref 44–72)
NRBC # BLD AUTO: 0 K/UL
NRBC BLD-RTO: 0 /100 WBC
PLATELET # BLD AUTO: 213 K/UL (ref 164–446)
PMV BLD AUTO: 9.7 FL (ref 9–12.9)
POTASSIUM SERPL-SCNC: 3.8 MMOL/L (ref 3.6–5.5)
PROT SERPL-MCNC: 7.3 G/DL (ref 6–8.2)
PSA SERPL-MCNC: 0.34 NG/ML (ref 0–4)
RBC # BLD AUTO: 4.41 M/UL (ref 4.7–6.1)
SODIUM SERPL-SCNC: 144 MMOL/L (ref 135–145)
TRIGL SERPL-MCNC: 149 MG/DL (ref 0–149)
WBC # BLD AUTO: 4.4 K/UL (ref 4.8–10.8)

## 2022-09-13 PROCEDURE — 36415 COLL VENOUS BLD VENIPUNCTURE: CPT

## 2022-09-13 PROCEDURE — 80061 LIPID PANEL: CPT

## 2022-09-13 PROCEDURE — 84153 ASSAY OF PSA TOTAL: CPT | Mod: GA

## 2022-09-13 PROCEDURE — 80053 COMPREHEN METABOLIC PANEL: CPT

## 2022-09-13 PROCEDURE — 85025 COMPLETE CBC W/AUTO DIFF WBC: CPT

## 2022-09-14 ENCOUNTER — APPOINTMENT (OUTPATIENT)
Dept: OCCUPATIONAL THERAPY | Facility: REHABILITATION | Age: 67
End: 2022-09-14
Attending: INTERNAL MEDICINE
Payer: MEDICARE

## 2022-09-21 ENCOUNTER — APPOINTMENT (OUTPATIENT)
Dept: OCCUPATIONAL THERAPY | Facility: REHABILITATION | Age: 67
End: 2022-09-21
Attending: INTERNAL MEDICINE
Payer: MEDICARE

## 2022-09-28 ENCOUNTER — APPOINTMENT (OUTPATIENT)
Dept: OCCUPATIONAL THERAPY | Facility: REHABILITATION | Age: 67
End: 2022-09-28
Attending: INTERNAL MEDICINE
Payer: MEDICARE

## 2022-09-29 ENCOUNTER — ANTICOAGULATION VISIT (OUTPATIENT)
Dept: VASCULAR LAB | Facility: MEDICAL CENTER | Age: 67
End: 2022-09-29
Attending: NURSE PRACTITIONER
Payer: MEDICARE

## 2022-09-29 DIAGNOSIS — Z79.01 CHRONIC ANTICOAGULATION: ICD-10-CM

## 2022-09-29 LAB — INR PPP: 3.7 (ref 2–3.5)

## 2022-09-29 PROCEDURE — 85610 PROTHROMBIN TIME: CPT

## 2022-09-29 PROCEDURE — 99212 OFFICE O/P EST SF 10 MIN: CPT

## 2022-09-29 RX ORDER — WARFARIN SODIUM 2.5 MG/1
TABLET ORAL
Qty: 90 TABLET | Refills: 1 | Status: SHIPPED | OUTPATIENT
Start: 2022-09-29 | End: 2022-10-03 | Stop reason: SDUPTHER

## 2022-09-29 NOTE — PROGRESS NOTES
Anticoagulation Summary  As of 9/29/2022      INR goal:  2.0-3.0   TTR:  70.5 % (7.3 y)   INR used for dosing:  3.70 (9/29/2022)   Warfarin maintenance plan:  2.5 mg (2.5 mg x 1) every day   Weekly warfarin total:  17.5 mg   Plan last modified:  GAMAL Nash (5/20/2021)   Next INR check:  10/13/2022   Target end date:  Indefinite    Indications    Chronic anticoagulation [Z79.01]  Deep vein thrombosis [453.40] (Resolved) [I82.409]  Stroke [434.91] (Resolved) [I63.9]  Deep vein thrombosis (DVT) (HCC) (Resolved) [I82.409]                 Anticoagulation Episode Summary       INR check location:  Anticoagulation Clinic    Preferred lab:      Send INR reminders to:      Comments:            Anticoagulation Care Providers       Provider Role Specialty Phone number    Jamari Platt M.D. Referring [facility] Medicine [phone]    [facility] Anticoagulation Services   [phone]                  Refer to Patient Findings for HPI:       pt declined vitals    Verified current warfarin dosing schedule.    Pt is not on antiplatelet therapy      A/P   INR  SUPRA-therapeutic.     Warfarin dosing recommendation: Hold warfarin dose then Pt is to continue with current warfarin dosing regimen.     Pt educated to contact our clinic with any changes in medications or s/s of bleeding or thrombosis. Pt is aware to seek immediate medical attention for falls, head injury or deep cuts.    Follow up appointment in 2 week(s).    Markus Salazar, PharmD

## 2022-09-30 LAB — INR BLD: 3.7 (ref 0.9–1.2)

## 2022-10-03 ENCOUNTER — OFFICE VISIT (OUTPATIENT)
Dept: MEDICAL GROUP | Facility: PHYSICIAN GROUP | Age: 67
End: 2022-10-03
Payer: MEDICARE

## 2022-10-03 VITALS
SYSTOLIC BLOOD PRESSURE: 132 MMHG | HEART RATE: 85 BPM | WEIGHT: 160 LBS | RESPIRATION RATE: 16 BRPM | TEMPERATURE: 97.8 F | DIASTOLIC BLOOD PRESSURE: 68 MMHG | HEIGHT: 72 IN | OXYGEN SATURATION: 95 % | BODY MASS INDEX: 21.67 KG/M2

## 2022-10-03 DIAGNOSIS — Z23 NEED FOR VACCINATION: ICD-10-CM

## 2022-10-03 DIAGNOSIS — Z79.01 CHRONIC ANTICOAGULATION: ICD-10-CM

## 2022-10-03 DIAGNOSIS — I69.919 COGNITIVE DEFICITS AS LATE EFFECT OF CEREBROVASCULAR DISEASE: ICD-10-CM

## 2022-10-03 DIAGNOSIS — E78.5 DYSLIPIDEMIA (HIGH LDL; LOW HDL): Chronic | ICD-10-CM

## 2022-10-03 DIAGNOSIS — E87.6 HYPOKALEMIA: ICD-10-CM

## 2022-10-03 DIAGNOSIS — Z13.6 SCREENING FOR CARDIOVASCULAR CONDITION: ICD-10-CM

## 2022-10-03 DIAGNOSIS — Z86.73 H/O: CVA (CEREBROVASCULAR ACCIDENT): ICD-10-CM

## 2022-10-03 PROCEDURE — 90662 IIV NO PRSV INCREASED AG IM: CPT

## 2022-10-03 PROCEDURE — 99214 OFFICE O/P EST MOD 30 MIN: CPT | Mod: 25

## 2022-10-03 PROCEDURE — G0008 ADMIN INFLUENZA VIRUS VAC: HCPCS

## 2022-10-03 PROCEDURE — 90677 PCV20 VACCINE IM: CPT

## 2022-10-03 PROCEDURE — G0009 ADMIN PNEUMOCOCCAL VACCINE: HCPCS

## 2022-10-03 RX ORDER — WARFARIN SODIUM 2.5 MG/1
TABLET ORAL
Qty: 100 TABLET | Refills: 1 | Status: SHIPPED | OUTPATIENT
Start: 2022-10-03 | End: 2023-11-06

## 2022-10-03 RX ORDER — ATORVASTATIN CALCIUM 20 MG/1
20 TABLET, FILM COATED ORAL DAILY
Qty: 100 TABLET | Refills: 3 | Status: SHIPPED | OUTPATIENT
Start: 2022-10-03 | End: 2023-11-06

## 2022-10-03 RX ORDER — POTASSIUM CHLORIDE 20 MEQ/1
20 TABLET, EXTENDED RELEASE ORAL DAILY
Qty: 100 TABLET | Refills: 3 | Status: SHIPPED | OUTPATIENT
Start: 2022-10-03 | End: 2023-11-06

## 2022-10-03 RX ORDER — FENOFIBRATE 48 MG/1
48 TABLET, COATED ORAL DAILY
Qty: 100 TABLET | Refills: 3 | Status: SHIPPED | OUTPATIENT
Start: 2022-10-03 | End: 2023-11-06

## 2022-10-03 ASSESSMENT — FIBROSIS 4 INDEX: FIB4 SCORE: 2.06

## 2022-10-03 ASSESSMENT — ENCOUNTER SYMPTOMS
WHEEZING: 0
PALPITATIONS: 0
HEARTBURN: 0
CONSTIPATION: 0
CHILLS: 0
ROS GI COMMENTS: HEMORRHOIDS
NERVOUS/ANXIOUS: 0
DIARRHEA: 0
COUGH: 0
DEPRESSION: 0
WEAKNESS: 1
ABDOMINAL PAIN: 0
SHORTNESS OF BREATH: 0
FEVER: 0

## 2022-10-03 NOTE — PROGRESS NOTES
Subjective:     CC: Patient presents today to establish care.  Prior PCP Dr. Lindo.  Patient presents today with his friend, and is requesting refills of his medications today.    HPI:   Drew presents today with    Problem   H/O: Cva (Cerebrovascular Accident)   Chronic Anticoagulation    St post ICH, on coumadin, Coumadin clinic follow-up     Dyslipidemia (High Ldl; Low Hdl)   Cognitive Deficits As Late Effect of Cerebrovascular Disease    ICD-10 transition         Health Maintenance: Completed  Exercise: encouraged some exercise for bone/muscle strength. Difficult for pt with right hemiparesis  Substance Abuse: history of ETOH, none since stroke  Safe in relationship. N/a, has not heard from his wife, or children since stroke.   Seat belts, Sun protection used.    ROS:  Review of Systems   Constitutional:  Negative for chills and fever.   Respiratory:  Negative for cough, shortness of breath and wheezing.    Cardiovascular:  Negative for chest pain and palpitations.   Gastrointestinal:  Negative for abdominal pain, constipation, diarrhea and heartburn.        Hemorrhoids   Genitourinary:  Negative for dysuria.   Musculoskeletal:         Pain to left ankle.    Skin:  Positive for rash.   Neurological:  Positive for weakness (right side).   Psychiatric/Behavioral:  Negative for depression. The patient is not nervous/anxious.      Objective:     Exam:  /68 (BP Location: Left arm, Patient Position: Sitting, BP Cuff Size: Small adult)   Pulse 85   Temp 36.6 °C (97.8 °F) (Temporal)   Resp 16   Ht 1.829 m (6') Comment: in wheel chair  Wt 72.6 kg (160 lb) Comment: per pt (wheelchair)  SpO2 95%   BMI 21.70 kg/m²  Body mass index is 21.7 kg/m².    Physical Exam  Vitals reviewed.   HENT:      Head: Normocephalic and atraumatic.      Right Ear: Ear canal and external ear normal. There is impacted cerumen.      Left Ear: Tympanic membrane, ear canal and external ear normal.      Nose:      Comments: Mask in place      Mouth/Throat:      Comments: Mask in place  Eyes:      Extraocular Movements: Extraocular movements intact.      Conjunctiva/sclera: Conjunctivae normal.      Pupils: Pupils are equal, round, and reactive to light.   Cardiovascular:      Rate and Rhythm: Normal rate and regular rhythm.      Pulses: Normal pulses.      Heart sounds: Normal heart sounds. No murmur heard.  Pulmonary:      Effort: Pulmonary effort is normal. No respiratory distress.      Breath sounds: Normal breath sounds. No wheezing.   Abdominal:      General: Bowel sounds are normal.   Musculoskeletal:      Comments: Atrophy right-sided, generalized deconditioning noted   Skin:     General: Skin is warm and dry.      Capillary Refill: Capillary refill takes less than 2 seconds.   Neurological:      Mental Status: He is alert. Mental status is at baseline.      Motor: Weakness present.      Comments: Right-sided hemiparesis   Psychiatric:         Mood and Affect: Mood normal.         Speech: Speech is delayed (s/p CVA).         Thought Content: Thought content normal.       Labs: Reviewed from 9/13/2022, reviewed DEXA from 8/31/2022, reviewed MR lumbar spine from 8/12/2022    Assessment & Plan:     66 y.o. male with the following -     Problem List Items Addressed This Visit       Dyslipidemia (high LDL; low HDL) (Chronic)     Chronic, stable.  Continue Lipitor 20 mg daily, fenofibrate 48 mg daily         Relevant Medications    atorvastatin (LIPITOR) 20 MG Tab    fenofibrate (TRICOR) 48 MG Tab    Cognitive deficits as late effect of cerebrovascular disease     Chronic, stable.           Chronic anticoagulation     Chronic, stable.  Continue Coumadin with Coumadin clinic follow-up and management.         Relevant Medications    warfarin (COUMADIN) 2.5 MG Tab    H/O: CVA (cerebrovascular accident)     Chronic, stable.          Other Visit Diagnoses       Need for vaccination        Relevant Orders    INFLUENZA VACCINE, HIGH DOSE (65+ ONLY)  (Completed)    Pneumococcal Conjugate Vaccine 20-Valent (19 yrs+) (Completed)    Hypokalemia        Relevant Medications    potassium chloride SA (KDUR) 20 MEQ Tab CR    Screening for cardiovascular condition        Relevant Orders    US-ABDOMINAL AORTA W/O DOPPLER          I have placed the below orders and discussed them with an approved delegating provider.  The MA is performing the below orders under the direction of Dr. Saavedra.    I spent a total of 37 minutes with record review, exam, communication with the patient, communication with other providers, and documentation of this encounter.    Return in about 6 months (around 4/3/2023).    Please note that this dictation was created using voice recognition software. I have made every reasonable attempt to correct obvious errors, but I expect that there are errors of grammar and possibly content that I did not discover before finalizing the note.

## 2022-10-10 ENCOUNTER — OFFICE VISIT (OUTPATIENT)
Dept: PHYSICAL MEDICINE AND REHAB | Facility: MEDICAL CENTER | Age: 67
End: 2022-10-10
Payer: MEDICARE

## 2022-10-10 VITALS
OXYGEN SATURATION: 97 % | HEART RATE: 79 BPM | DIASTOLIC BLOOD PRESSURE: 68 MMHG | SYSTOLIC BLOOD PRESSURE: 122 MMHG | RESPIRATION RATE: 16 BRPM | TEMPERATURE: 97.6 F

## 2022-10-10 DIAGNOSIS — Z86.73 HISTORY OF STROKE: Primary | ICD-10-CM

## 2022-10-10 DIAGNOSIS — G81.10 SPASTIC HEMIPARESIS AFFECTING DOMINANT SIDE (HCC): ICD-10-CM

## 2022-10-10 DIAGNOSIS — T87.43 RIGHT BKA INFECTION (HCC): ICD-10-CM

## 2022-10-10 PROCEDURE — 99214 OFFICE O/P EST MOD 30 MIN: CPT | Performed by: PHYSICAL MEDICINE & REHABILITATION

## 2022-10-10 NOTE — PROGRESS NOTES
[facility]  PM&R Neuro [facility]  [address] [place] [address]  Ph: [phone]    FOLLOW UP PATIENT EVALUATION - AMPUTEE CLINIC      Patient Name: Drew Melton   Patient : 1955  Patient Age: 66 y.o.   PCP: Jamari Platt M.D.    SUBJECTIVE:   Patient Identification: Drew Melton is a 66 y.o. 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 García  Etiology: Venous Thrombosis and Ischemia  Acute Rehab: N  Prosthetist: No prosthetic limb or prosthetist.   Prior Level of Function: Indpendent prior to stroke and amputation  Current Level of Function: Stand and transfer. Mostly in the wheelchair.     Accompanied by Today: Friend.   History of Present Illness:   - Not on any medications.   - Has numbness in the left heel, foot, and leg.   - It has been numb for a long time.   - Will have PT starting Oct 12th.   - States that he is not sleeping well.   - Has aortic US in November.   - Has home made brace which keeps finger flexors on the right open.   - Did have injections to foot without assistance.     Medication History (pertinent to amputation):   Gabapentin 100mg TID - no longer taking.     Review of Systems:  Unable to obtain full ROS due to aphasia.     Past Medical History:  Past Medical History:   Diagnosis Date    Cold 2018    Stroke ([facility]) 2001    Right sided paralysis    Cataract     Cognitive deficits, late effect of cerebrovascular disease     Hemiplegia affecting dominant side, late effect of cerebrovascular disease     High cholesterol     Homonymous bilateral field defects in visual  field     RHH    Unspecified late effects of cerebrovascular disease     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      Past Surgical History:   Procedure Laterality Date    COLONOSCOPY  2018    Procedure: COLONOSCOPY;  Surgeon: Lenin Danielle M.D.;  Location: SURGERY [facility];  Service: Gastroenterology    AMPUTATION, BELOW THE KNEE Right     OTHER      cataract IOLI        Past Social History:  Social History     Socioeconomic History    Marital status: Single     Spouse name: Not on file    Number of children: Not on file    Years of education: Not on file    Highest education level: Not on file   Occupational History    Not on file   Tobacco Use    Smoking status: Former     Types: Cigarettes     Quit date: 2001     Years since quittin.1    Smokeless tobacco: Never   Vaping Use    Vaping Use: Never used   Substance and Sexual Activity    Alcohol use: No     Alcohol/week: 0.0 oz     Comment: former abuser    Drug use: No    Sexual activity: Not Currently   Other Topics Concern    Not on file   Social History Narrative    Not on file     Social Determinants of Health     Financial Resource Strain: Not on file   Food Insecurity: Not on file   Transportation Needs: Not on file   Physical Activity: Not on file   Stress: Not on file   Social Connections: Not on file   Intimate Partner Violence: Not on file   Housing Stability: Not on file        Family History:  Family History   Problem Relation Age of Onset    No Known Problems Mother     No Known Problems Father     No Known Problems Maternal Grandmother     No Known Problems Maternal Grandfather     No Known Problems Paternal Grandmother     No Known Problems Paternal Grandfather     Stroke Neg Hx     Diabetes Neg Hx     Cancer Neg Hx     Heart Disease Neg Hx     Hypertension Neg Hx     Hyperlipidemia Neg Hx        Depression and Opioid Screening  PHQ-9:  Depression Screen (PHQ-2/PHQ-9) 2020   PHQ-2 Total Score - -  -   PHQ-2 Total Score - - -   PHQ-2 Total Score 0 0 0     Interpretation of PHQ-9 Total Score   Score Severity   1-4 No Depression   5-9 Mild Depression   10-14 Moderate Depression   15-19 Moderately Severe Depression   20-27 Severe Depression     Opioid Risk Score: No value filed.  Interpretation of Opioid Risk Score   Score 0-3 = Low risk of abuse. Do UDS at least once per year.  Score 4-7 = Moderate risk of abuse. Do UDS 1-4 times per year.  Score 8+ = High risk of abuse. Refer to specialist.      OBJECTIVE:   Vital Signs:  Vitals:    10/10/22 0923   BP: 122/68   Pulse: 79   Resp: 16   Temp: 36.4 °C (97.6 °F)   SpO2: 97%        Physical Exam:   GEN: No apparent distress  HEENT: Head normocephalic, atraumatic.  Sclera nonicteric bilaterally, no ocular discharge appreciated bilaterally.  CV: Extremities warm and well-perfused, no peripheral edema appreciated left lower extremity at the ankle.  2+/4 DP/PT pulses left ankle  PULMONARY: Breathing nonlabored on room air, no respiratory accessory muscle use.  Not requiring supplemental oxygen.  SKIN: No appreciable skin breakdown on exposed areas of skin.  PSYCH: Mood and affect within normal limits.  NEURO: Awake alert.  Aphasic.  Communication difficult.  Spastic right upper extremity with finger flexion contracture.  MSK: Right BKA, flexion contracture at the knee    Imaging:   MRI lumbar spine 8/12/2022  Dextrocurvature of the thoracolumbar junction.  Mild degenerative changes throughout the lumbar spine without significant canal stenosis or foraminal narrowing.    ASSESSMENT/PLAN: Drew Melton  is a 66 y.o. male with rehabilitation history significant for right BKA, left CVA secondary to ICA occlusion early 2000's, chronic compression fracture L3 vertebral body with associated low back pain, here for new amputee evaluation. The following plan was discussed with the patient who is in agreement.     Visit Diagnoses     ICD-10-CM   1. History of stroke  Z86.73    2. Spastic hemiparesis affecting dominant side ([facility])  G81.10   3. Right BKA infection ([facility])  T87.43        Friend, Eris, assists with history  History and subjective portions of exam difficult to ascertain due to patient's expressive aphasia even with friend assisting with history.    Rehab/Ortho/Neuro:   Right BKA  -Patient unable to use a prosthetic limb due to the fact that he has significant flexion contracture at the knee.  Would not be a functional ambulator.  -K level: K0  - Continue physical therapy.    2. Left CVA secondary to ICA occlusion early 2000's   3. Right hemiparesis  4. Aphasia  -Continue physical therapy.    -Face-to-face: Patient is wheelchair confined when out of bed. Patient needs custom light weight manual wheelchair for safe mobility and ADL at home.  Patient's primary form of locomotion is his wheelchair given that he had a stroke with right hemiparesis and right BKA without potential to functionally ambulate with a prosthetic limb.  He is a primary wheelchair user and would benefit from custom lightweight chair for joint preservation over time.  -Physical therapy evaluation upcoming for evaluation of wheelchair.    5. Chronic compression fracture L3 vertebral body with associated low back pain  6.  Paresthesia left lower extremity: Patient reports numbness distal left lower extremity.  Denies that the numbness is painful but states that he also has some pain in his great toe where the toenail is trying to grow back and his heel.  - MRI negative for radicular signs and symptoms  - Defer to PCP for continued work-up, if deemed appropriate, for left lower extremity numbness.    Spasticity: Significant spasticity of his right upper extremity.  Could be a component of his right knee flexion contracture as well however he is definitely contracted at this point.  -Have counseled regarding use of Botox for hygiene for right upper extremity finger flexors    Mood/Sleep: Denies sleeping well.   Wakes up because of pain.  - Trial melatonin OTC.      Follow up: 6 months to reevaluate patient and follow-up on wheelchair.      Total time spent was 30 minutes.  Included in this time is the time spent preparing for the visit including record review, my exam and evaluation, counseling and education regarding that which is aforementioned in the assessment and plan.  Time was spent documenting into patient's electronic health record.  Time was spent ordering the appropriate labs, tests, procedures, referrals, medications.  Including this time was also the time spent in care coordination. Included this time as the time spent obtaining history from someone other than the patient.  Some of the time included occurred outside of charting time.  Discussion involved the patient and friend.      Please note that this dictation was created using voice recognition software. I have made every reasonable attempt to correct obvious errors but there may be errors of grammar and content that I may have overlooked prior to finalization of this note.    Dr. Starr Jones DO, MS  Department of Physical Medicine & Rehabilitation  Neuro Rehabilitation Clinic  [facility]

## 2022-10-12 ENCOUNTER — APPOINTMENT (OUTPATIENT)
Dept: PHYSICAL THERAPY | Facility: REHABILITATION | Age: 67
End: 2022-10-12
Attending: INTERNAL MEDICINE
Payer: MEDICARE

## 2022-10-12 NOTE — OP THERAPY EVALUATION
"  Outpatient Physical Therapy  INITIAL NEUROLOGICAL EVALUATION    [facility] Physical Therapy [address].  [address]  [place] [address]  Phone:  [phone]  Fax:  [phone]    Date of Evaluation: 10/12/2022    Patient: Drew Melton  YOB: 1955  MRN: [id number]     Referring Provider: Jamari Platt M.D.  [address] [place] [address]   Referring Diagnosis Compression fracture of lumbar spine, non-traumatic, sequela [M48.56XS];Collapsed vertebra, not elsewhere classified, thoracolumbar region, initial encounter for fracture ([facility]) [M48.55XA];Hemiplegia of dominant side as late effect following cerebrovascular disease ([facility]) [I69.959];Physical deconditioning [R53.81];S/P BKA (below knee amputation) unilateral ([facility]) [Z89.519];Chronic lumbosacral pain [M54.50, G89.29]     Time Calculation                       Chief Complaint: No chief complaint on file.    Visit Diagnoses     ICD-10-CM   1. History of stroke  Z86.73   2. Spastic hemiparesis affecting dominant side ([facility])  G81.10   3. Right BKA infection ([facility])  T87.43       Subjective:   History of Present Illness:     Mechanism of injury:  Pt is a 67 yo male presenting to PT with c/o back pain. PMH: \"rehabilitation history significant for right BKA, left CVA secondary to ICA occlusion early 2000's, chronic compression fracture L3 vertebral body\".    Past Medical History:   Diagnosis Date    Cataract     Cognitive deficits, late effect of cerebrovascular disease     Cold 01/01/2018    Hemiplegia affecting dominant side, late effect of cerebrovascular disease     High cholesterol     Homonymous bilateral field defects in visual field     RHH    Stroke ([facility]) 09/01/2001    Right sided paralysis    Unspecified late effects of cerebrovascular disease     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     Past Surgical History:   Procedure Laterality Date    COLONOSCOPY  1/24/2018    Procedure: COLONOSCOPY;  Surgeon: Lenin" LISS Danielle M.D.;  Location: SURGERY [facility];  Service: Gastroenterology    AMPUTATION, BELOW THE KNEE Right     OTHER      cataract IOLI     Social History     Tobacco Use    Smoking status: Former     Types: Cigarettes     Quit date: 2001     Years since quittin.1    Smokeless tobacco: Never   Substance Use Topics    Alcohol use: No     Alcohol/week: 0.0 oz     Comment: former abuser     Family and Occupational History     Socioeconomic History    Marital status: Single     Spouse name: Not on file    Number of children: Not on file    Years of education: Not on file    Highest education level: Not on file   Occupational History    Not on file       Objective    Exercises/Treatment  Time-based treatments/modalities:           Assessment/Response/Plan    Functional Assessment Used          Referring provider co-signature:  I have reviewed this plan of care and my co-signature certifies the need for services.    Certification Period: 10/12/2022 to  {Future Date:}    Physician Signature: ________________________________ Date: ______________

## 2022-10-13 ENCOUNTER — ANTICOAGULATION VISIT (OUTPATIENT)
Dept: VASCULAR LAB | Facility: MEDICAL CENTER | Age: 67
End: 2022-10-13
Attending: NURSE PRACTITIONER
Payer: MEDICARE

## 2022-10-13 VITALS — DIASTOLIC BLOOD PRESSURE: 84 MMHG | SYSTOLIC BLOOD PRESSURE: 141 MMHG | HEART RATE: 79 BPM

## 2022-10-13 DIAGNOSIS — Z79.01 CHRONIC ANTICOAGULATION: ICD-10-CM

## 2022-10-13 LAB
INR BLD: 4.3 (ref 0.9–1.2)
INR PPP: 4.3 (ref 2–3.5)

## 2022-10-13 PROCEDURE — 99212 OFFICE O/P EST SF 10 MIN: CPT | Performed by: NURSE PRACTITIONER

## 2022-10-13 PROCEDURE — 85610 PROTHROMBIN TIME: CPT

## 2022-10-13 NOTE — PROGRESS NOTES
Anticoagulation Summary  As of 10/13/2022      INR goal:  2.0-3.0   TTR:  70.1 % (7.3 y)   INR used for dosin.30 (10/13/2022)   Warfarin maintenance plan:  1.25 mg (2.5 mg x 0.5) every Sat; 2.5 mg (2.5 mg x 1) all other days   Weekly warfarin total:  16.25 mg   Plan last modified:  Khushboo Wiseman AColinPColinNColin (10/13/2022)   Next INR check:  10/27/2022   Target end date:  Indefinite    Indications    Chronic anticoagulation [Z79.01]  Deep vein thrombosis [453.40] (Resolved) [I82.409]  Stroke [434.91] (Resolved) [I63.9]  Deep vein thrombosis (DVT) (HCC) (Resolved) [I82.409]                 Anticoagulation Episode Summary       INR check location:  Anticoagulation Clinic    Preferred lab:      Send INR reminders to:      Comments:            Anticoagulation Care Providers       Provider Role Specialty Phone number    Jamari Platt M.D. Referring Family Medicine [phone]    [facility]ticoagulation [facility]   [phone]                  Refer to Patient Findings for HPI:  Patient Findings       Negatives:  Signs/symptoms of thrombosis, Signs/symptoms of bleeding, Laboratory test error suspected, Change in health, Change in alcohol use, Change in activity, Upcoming invasive procedure, Emergency department visit, Upcoming dental procedure, Missed doses, Extra doses, Change in medications, Change in diet/appetite, Hospital admission, Bruising, Other complaints            Vitals:    10/13/22 0857   BP: (!) 141/84   Pulse: 79         Verified current warfarin dosing schedule.    Medications reconciled   Pt is not on antiplatelet therapy      A/P   INR  supra-therapeutic.     Warfarin dosing recommendation: hold tomorrow 10/14/22 dose, and take 0.5 tab on , follow current regimen with above changes    Pt educated to contact our clinic with any changes in medications or s/s of bleeding or thrombosis. Pt is aware to seek immediate medical attention for falls, head injury or deep cuts.    Follow up appointment  in 2 week(s).    GAMAL Nash

## 2022-10-19 ENCOUNTER — APPOINTMENT (OUTPATIENT)
Dept: PHYSICAL THERAPY | Facility: REHABILITATION | Age: 67
End: 2022-10-19
Attending: INTERNAL MEDICINE
Payer: MEDICARE

## 2022-10-26 ENCOUNTER — APPOINTMENT (OUTPATIENT)
Dept: PHYSICAL THERAPY | Facility: REHABILITATION | Age: 67
End: 2022-10-26
Attending: INTERNAL MEDICINE
Payer: MEDICARE

## 2022-10-27 ENCOUNTER — ANTICOAGULATION VISIT (OUTPATIENT)
Dept: VASCULAR LAB | Facility: MEDICAL CENTER | Age: 67
End: 2022-10-27
Attending: NURSE PRACTITIONER
Payer: MEDICARE

## 2022-10-27 DIAGNOSIS — Z79.01 CHRONIC ANTICOAGULATION: ICD-10-CM

## 2022-10-27 LAB — INR PPP: 2.7 (ref 2–3.5)

## 2022-10-27 PROCEDURE — 99211 OFF/OP EST MAY X REQ PHY/QHP: CPT

## 2022-10-27 PROCEDURE — 85610 PROTHROMBIN TIME: CPT

## 2022-10-27 NOTE — PROGRESS NOTES
Anticoagulation Summary  As of 10/27/2022      INR goal:  2.0-3.0   TTR:  69.9 % (7.4 y)   INR used for dosin.70 (10/27/2022)   Warfarin maintenance plan:  1.25 mg (2.5 mg x 0.5) every Sat; 2.5 mg (2.5 mg x 1) all other days   Weekly warfarin total:  16.25 mg   Plan last modified:  NALINI NashPColinNColin (10/13/2022)   Next INR check:  11/10/2022   Target end date:  Indefinite    Indications    Chronic anticoagulation [Z79.01]  Deep vein thrombosis [453.40] (Resolved) [I82.409]  Stroke [434.91] (Resolved) [I63.9]  Deep vein thrombosis (DVT) (HCC) (Resolved) [I82.409]                 Anticoagulation Episode Summary       INR check location:  Anticoagulation Clinic    Preferred lab:      Send INR reminders to:      Comments:            Anticoagulation Care Providers       Provider Role Specialty Phone number    Jamari Platt M.D. Referring Family Medicine [phone]    [facility] Anticoagulation Services   [phone]                  Refer to Patient Findings for HPI:  Patient Findings       Negatives:  Signs/symptoms of thrombosis, Signs/symptoms of bleeding, Laboratory test error suspected, Change in health, Change in alcohol use, Change in activity, Upcoming invasive procedure, Emergency department visit, Upcoming dental procedure, Missed doses, Extra doses, Change in medications, Change in diet/appetite, Hospital admission, Bruising, Other complaints            There were no vitals filed for this visit.   pt declined vitals    Verified current warfarin dosing schedule.    Medications reconciled   Pt is not on antiplatelet therapy      A/P   INR  therapeutic.     Warfarin dosing recommendation: Pt is to continue with current warfarin dosing regimen.    Pt educated to contact our clinic with any changes in medications or s/s of bleeding or thrombosis. Pt is aware to seek immediate medical attention for falls, head injury or deep cuts.    Follow up appointment in 2 week(s).    Verito Quispe, LyndseyD

## 2022-11-01 LAB — INR BLD: 2.7 (ref 0.9–1.2)

## 2022-11-02 ENCOUNTER — APPOINTMENT (OUTPATIENT)
Dept: PHYSICAL THERAPY | Facility: REHABILITATION | Age: 67
End: 2022-11-02
Attending: INTERNAL MEDICINE
Payer: MEDICARE

## 2022-11-08 ENCOUNTER — HOSPITAL ENCOUNTER (OUTPATIENT)
Dept: RADIOLOGY | Facility: MEDICAL CENTER | Age: 67
End: 2022-11-08
Payer: MEDICARE

## 2022-11-08 DIAGNOSIS — Z13.6 SCREENING FOR CARDIOVASCULAR CONDITION: ICD-10-CM

## 2022-11-08 PROCEDURE — 76775 US EXAM ABDO BACK WALL LIM: CPT

## 2022-11-09 ENCOUNTER — PHYSICAL THERAPY (OUTPATIENT)
Dept: PHYSICAL THERAPY | Facility: REHABILITATION | Age: 67
End: 2022-11-09
Attending: PHYSICAL MEDICINE & REHABILITATION
Payer: MEDICARE

## 2022-11-09 DIAGNOSIS — Z86.73 HISTORY OF STROKE: ICD-10-CM

## 2022-11-09 DIAGNOSIS — G81.10 SPASTIC HEMIPARESIS AFFECTING DOMINANT SIDE (HCC): ICD-10-CM

## 2022-11-09 DIAGNOSIS — T87.43 RIGHT BKA INFECTION (HCC): ICD-10-CM

## 2022-11-09 PROCEDURE — 97163 PT EVAL HIGH COMPLEX 45 MIN: CPT

## 2022-11-09 SDOH — ECONOMIC STABILITY: HOUSING INSECURITY: ELEVATOR PRESENT: 0

## 2022-11-09 ASSESSMENT — BALANCE ASSESSMENTS
BALANCE - SITTING STATIC: NORMAL
BALANCE - STANDING STATIC: DEPENDENT
BALANCE - STANDING DYNAMIC: DEPENDENT
BALANCE - SITTING DYNAMIC: NORMAL

## 2022-11-09 ASSESSMENT — ACTIVITIES OF DAILY LIVING (ADL)
AMBULATION_WITH_ASSISTIVE_DEVICE: UNABLE
AMBULATION_WITHOUT_ASSISTIVE_DEVICE: UNABLE

## 2022-11-09 ASSESSMENT — ENCOUNTER SYMPTOMS: LOWER EXTREMITY EDEMA: 0

## 2022-11-09 NOTE — OP THERAPY EVALUATION
Outpatient Physical Therapy  WHEELCHAIR EVALUATION    [facility] [address]  [address]  [place] [address]  Phone:  [phone]  Fax:  [phone]    Date of Evaluation: 11/09/2022    Patient Name: Drew Melton  YOB: 1955  MRN: [id number]   Height:   1.829 m (6') (in wheel chair)   Weight: 72.6 kg (160 lb) (per pt (wheelchair))       Referring Provider: Starr Jones D.O.  [address]  [address]  [place] [address]   Referring Diagnosis History of stroke [Z86.73];Spastic hemiparesis affecting dominant side (HCC) [G81.10];Right BKA infection ([facility]) [T87.43]     Time Calculation                     Pertinent medical history and general limitations: Drew is a 66 y.o. male with rehabilitation history significant for right BKA, left CVA secondary to ICA occlusion early 2000's, chronic compression fracture L3 vertebral body with associated low back pain. Secondary to CVA Rt upper extremity is hemiparetic and he lacks any functional use of the arm. With BKA and associated contracture as well as hemiparesis of Rt lower extremity pt is unable to ambulate nor is he a candidate for prosthetic and is therefore wc bound. Endorses back pain and numbness in left foot as well as chronic hemorrhoids and skin changes.     Pt has significant expressive aphasia requiring increased time to answer as  well as receive aphasia requiring frequent rephrasing of question for understanding. Friend present assisted with answering subjective.       Current level of functional mobility / ADLs: Pt has been completely wc bound since CVA in early 2000s. Spends all day in his current manual chair in which he bought as well as cushion off the Internet 5 years ago.   Pt reports ability to perform wc<>bed transfers as well as toilet and shower transfers independently with use of stand pivot without assisted devices for transfer. He utilizes a shower chair. He is able to perform  cooking, getting dressed, don shoes in a sitting position. Pt sleeps in a twin bed without ability to raise/lower and is independently with bed mobility.   Pt able to propel wc household distance with Lt lower and upper extremity however requires assistance for community distances due to limited endurance and strength relying on friend to propel wc.  Pt denies pressure sores, or incontinence and is able to get to the bathroom. He does endorse skin changes/pain on right ischial tuberosity. States he has told PCP about this but is not documented.       Falls skin breakdown no accidents. Able to get to bathroom on time    Detailed ADL's  Transfers/Mobility:     Bed/chair transfers: independent    Wheelchair transfers: independent    Sit to stand: independent    Sit to supine: independent    Toilet transfers: independent    Tub/shower transfers: independent       Tub/shower transfer equipment used: shower chair    Dressing:     Dressing: independent    Dressing upper body: independent    Dressing lower body: independent    Socks: independent    Shoes: independent    Lower body adaptive equipment used: none    Household Management:     Meal preparation: independent    Lower extremity edema and pressure ulcers     Negative for lower extremity edema and pressure ulcers    Pain     Pain scale: Due to aphasia pt unable to rate pain.    Living situation     Lives with: roommate or housemate    Lives in: single level home    Dwelling has a ramp: yes    Dwelling has an elevator: no    Assistive device history:    Current assistive device(s) used: manual wheelchair    Hours per day in a wheelchair: 16 hours    Fall history:    Recent fall: no recent fall    Fall history details: Pt fell approx 1.5 years ago without injury. However pt unable to perform floor transfer and required emergency services to come and assist.     Active Range of Motion:   Upper extremity (left):     Shoulder flexion: Below functional limits    Shoulder  abduction: Below functional limits    Shoulder external rotation: Below functional limits    Shoulder internal rotation: Below functional limits    Elbow flexion: Within functional limits    Elbow extension: Within functional limits    Forearm pronation: Within functional limits    Forearm supination: Within functional limits    Wrist flexion: Within functional limits    Wrist extension: Within functional limits    Fingers flexion: Within functional limits    Fingers extension: Within functional limits    Fingers abduction: Within functional limits    Fingers adduction: Within functional limits  Upper extremity (right):     All right upper extremity active range of motion: All below functional limits  Lower extremity (left):     All left lower extremity active range of motion: All within functional limits  Lower extremity (right):     Hip flexion: Below functional limits    Hip extension: Below functional limits    Hip abduction:Below functional limits    Hip adduction: Below functional limits    Hip external rotation: Below functional limits    Hip internal rotation: Below functional limits    Passive Range of Motion:   Passive range of motion comments: Rt residual limb knee flexion contraction 100 deg    Strength:   Upper extremity strength (left):     Shoulder flexion: 4-    Shoulder abduction: 4    Shoulder external rotation:  4    Shoulder internal rotation: 4    Elbow flexion: 4+    Elbow extension: 5    Forearm pronation: 4+    Forearm supination: 4    Wrist flexion: 4    Wrist extension: 5    Strength Left Fingers Flexion: WNL.    Strength Left Fingers Extension: WNL.    Strength Left Fingers Abduction: WNL.  Lower extremity (left):     Hip flexion: 4    Hip abduction: 4    Hip adduction: 4    Knee flexion: 5    Knee extension: 5    Ankle dorsiflexion: 4    Ankle plantar flexion: 4-  Lower extremity (right):     Hip flexion: 3-    Hip abduction: 3-    Hip adduction: 3-    Tone:     Left upper extremity  muscle tone: Normal    Right upper extremity muscle tone: Hypertonic    Left lower extremity muscle tone: Normal    Modified Guy:   Upper extremity (right):     Elbow flexors: 1+    Coordination   Upper extremity (left):     Gross motor: Within functional limits    Rapid alternating movements: Within functional limits  Upper extremity (right):     Gross motor: Absent    Rapid alternating movements: Absent    Postural Control (Balance)     Sitting (static): Normal    Sitting (dynamic): Normal    Standing (static): Dependent (Unable to stand without UE support)    Standing (dynamic): Dependent    Ambulation     Ambulation with assistive device: unable    Ambulation without assistive device: unable    Additional ambulation details: Transfer: Pt demonstrated ability to perform wc<>mat transfer wit Left UE/Lt LE. Per pt mat table positioned similar height of his bed at home. Pt performed transfer safely mod I for increased time.    Equipment recommendations   Type/Model     Recommendation: manual wheelchair    Manual wheelchair type: lightweight    Justification: Mr. Melton requires the use of a lightweight manual wheelchair to enable independent functional mobility and independence with basic and complex ADLs within his home and in the community. He is unable to ambulate due to his diagnosis of Rt BKA and CVA impacting Rt side and is considered a heavy user of the wheelchair since he spends 16 hours per day in the wheelchair; therefore, he will be a lifetime user of a wheelchair. He will be utilizing the wheelchair to attend community outings and to perform all basic daily needs such as grooming, meal prep, dressing, cleaning, grocery shopping, and laundry. The wheelchair will enable him to be able to participate in all of these daily activities without excessive fatigue due to decreased energy expenditure and will decrease his risk for shoulder problems in the future. The frame is also very durable and will stand  up to harsh conditions for longer, such as snow and mud which is appropriate per this region . He requires a chair that will enable him to propel on uneven terrain with decreased resistance and less energy expenditure. Without the lightweight manual wheelchair, he will be limited by the amount of energy required to participate in all of the above stated tasks and is placed at an increased risk for skin breakdown and shoulder injury. He is unable to stand or ambulate with any assistive device. He does not have sufficient upper or lower extremity ROM or strength to self-propel either a standard manual wheelchair as he is only able to use one side.       Seating System:    Recommendation(s) for manual wheelchair: general use back cushion    Justification: Pt will require general use back cushion in order to maximize his trunk positioning, decrease risk of injury/musculoskeletal dysfunction. Less supportive backrest may lead poor upper extremity postioning, increase fatigue in sitting position, and skin breakdown. Pt will also require a general use seat cushion to decrease risk of skin breakdown or possible pressure injury with appropriate pressure distribution. As well as facilitate better pelvic alignment while sitting for extended periods.     LAP BELT   He requires a lap belt to keep him/her safely positioned in the wheelchair.    Arm Rests:    Recommendation: height-adjustable arm rests    Justification: Height adjustable arm rests will allow for patient to maximize his seated posture and decrease risk of prolonged positional changes and maximize his function. Non adjustably armrest would likely lead pt to spend prolonged time in side bending positioning possibly leading to increased back pain.      Wheels:    Recommendation: solid insert wheels    Justification: Due to regular community use will require solid insert wheels to decrease maintain needs as well as prevention of flat tires.   Additional Features:     Recommendation: rear anti-tippers    Justification: Adjustable axle required to allow fr center of gravity shift in order to make more stable to perform curb negotiation. in order to maximize patients functional mobility within the home due to secondary impairments including weakness as well as hemiparetic right upper extremity in secondry to CVA improve community access to perform grocery shop without physical assistance.     He will also require anti-tippers to improve safety and minimize his fall risk and decrease risk of injury while in wheelchair.      The patient has been determined to be independent and safe with the above equipment. It is my recommendation that they receive a lightweight manual chair with the above specifications for use in their home and community.  The use of this equipment will improve their independence and safety for mobility and ADLs in the home as well as maximize community access. The use of this equipment is considered a required lifetime medical need and will contribute to cost containment in the future.      The therapist performing the evaluation has no financial relationship with the vendor involved in the evaluation. Thank you in advance for the consideration for the medical needs of this patient. If you have and questions regarding this equipment please do not hesitate to call me at [phone].               Functional Assessment Used        Electronically signed by Geoffrey Emerson PT, DPT on 11/9/2022    Referring provider co-signature:  I have reviewed this evaluation and recommendation(s) and my co-signature certifies my agreement with the contents.      Physician Signature: ________________________________ Date: ______________

## 2022-11-10 ENCOUNTER — ANTICOAGULATION VISIT (OUTPATIENT)
Dept: VASCULAR LAB | Facility: MEDICAL CENTER | Age: 67
End: 2022-11-10
Attending: NURSE PRACTITIONER
Payer: MEDICARE

## 2022-11-10 DIAGNOSIS — Z79.01 CHRONIC ANTICOAGULATION: ICD-10-CM

## 2022-11-10 LAB — INR PPP: 2.7 (ref 2–3.5)

## 2022-11-10 PROCEDURE — 85610 PROTHROMBIN TIME: CPT

## 2022-11-10 PROCEDURE — 99211 OFF/OP EST MAY X REQ PHY/QHP: CPT

## 2022-11-10 ASSESSMENT — ACTIVITIES OF DAILY LIVING (ADL)
DRESSING_SOCKS_CURRENT_FUNCTION: INDEPENDENT
DRESSING_SHOES_CURRENT_FUNCTION: INDEPENDENT
PREPARING MEALS: INDEPENDENT
DRESSING_LB_CURRENT_FUNCTION: INDEPENDENT
DRESSING_UB_CURRENT_FUNCTION: INDEPENDENT
DRESSING_CURRENT_FUNCTION: INDEPENDENT

## 2022-11-10 NOTE — PROGRESS NOTES
Anticoagulation Summary  As of 11/10/2022      INR goal:  2.0-3.0   TTR:  70.0 % (7.4 y)   INR used for dosin.70 (11/10/2022)   Warfarin maintenance plan:  1.25 mg (2.5 mg x 0.5) every Sat; 2.5 mg (2.5 mg x 1) all other days; Starting 11/10/2022   Weekly warfarin total:  16.25 mg   Plan last modified:  GAMAL Nash (10/13/2022)   Next INR check:  2022   Target end date:  Indefinite    Indications    Chronic anticoagulation [Z79.01]  Deep vein thrombosis [453.40] (Resolved) [I82.409]  Stroke [434.91] (Resolved) [I63.9]  Deep vein thrombosis (DVT) (HCC) (Resolved) [I82.409]                 Anticoagulation Episode Summary       INR check location:  Anticoagulation Clinic    Preferred lab:      Send INR reminders to:      Comments:            Anticoagulation Care Providers       Provider Role Specialty Phone number    Jamari Platt M.D. Referring Family Medicine [phone]    [facility]   [phone]                  Refer to Patient Findings for HPI:  Patient Findings       Negatives:  Signs/symptoms of thrombosis, Signs/symptoms of bleeding, Laboratory test error suspected, Change in health, Change in alcohol use, Change in activity, Upcoming invasive procedure, Emergency department visit, Upcoming dental procedure, Missed doses, Extra doses, Change in medications, Change in diet/appetite, Hospital admission, Bruising, Other complaints            There were no vitals filed for this visit.   pt declined vitals    Verified current warfarin dosing schedule.    Medications reconciled   Pt is not on antiplatelet therapy      A/P   INR  -therapeutic.     Warfarin dosing recommendation: Continue current regimen    Pt educated to contact our clinic with any changes in medications or s/s of bleeding or thrombosis. Pt is aware to seek immediate medical attention for falls, head injury or deep cuts.    Follow up appointment in 3 week(s).  Cl Cullen, Pharmacy Intern    Verito SINGLETON  Jose Enrique, PharmD

## 2022-11-15 LAB — INR BLD: 2.7 (ref 0.9–1.2)

## 2022-11-16 ENCOUNTER — APPOINTMENT (OUTPATIENT)
Dept: PHYSICAL THERAPY | Facility: REHABILITATION | Age: 67
End: 2022-11-16
Attending: INTERNAL MEDICINE
Payer: MEDICARE

## 2022-11-23 ENCOUNTER — APPOINTMENT (OUTPATIENT)
Dept: PHYSICAL THERAPY | Facility: REHABILITATION | Age: 67
End: 2022-11-23
Attending: INTERNAL MEDICINE
Payer: MEDICARE

## 2022-11-30 ENCOUNTER — APPOINTMENT (OUTPATIENT)
Dept: PHYSICAL THERAPY | Facility: REHABILITATION | Age: 67
End: 2022-11-30
Attending: INTERNAL MEDICINE
Payer: MEDICARE

## 2022-12-01 ENCOUNTER — ANTICOAGULATION VISIT (OUTPATIENT)
Dept: VASCULAR LAB | Facility: MEDICAL CENTER | Age: 67
End: 2022-12-01
Attending: NURSE PRACTITIONER
Payer: MEDICARE

## 2022-12-01 DIAGNOSIS — Z79.01 CHRONIC ANTICOAGULATION: ICD-10-CM

## 2022-12-01 LAB — INR PPP: 2.6 (ref 2–3.5)

## 2022-12-01 PROCEDURE — 99211 OFF/OP EST MAY X REQ PHY/QHP: CPT

## 2022-12-01 PROCEDURE — 85610 PROTHROMBIN TIME: CPT

## 2022-12-01 NOTE — PROGRESS NOTES
Anticoagulation Summary  As of 2022      INR goal:  2.0-3.0   TTR:  70.2 % (7.5 y)   INR used for dosin.60 (2022)   Warfarin maintenance plan:  1.25 mg (2.5 mg x 0.5) every Sat; 2.5 mg (2.5 mg x 1) all other days; Starting 2022   Weekly warfarin total:  16.25 mg   Plan last modified:  GAMAL Nash (10/13/2022)   Next INR check:  2022   Target end date:  Indefinite    Indications    Chronic anticoagulation [Z79.01]  Deep vein thrombosis [453.40] (Resolved) [I82.409]  Stroke [434.91] (Resolved) [I63.9]  Deep vein thrombosis (DVT) (HCC) (Resolved) [I82.409]                 Anticoagulation Episode Summary       INR check location:  Anticoagulation Clinic    Preferred lab:      Send INR reminders to:      Comments:            Anticoagulation Care Providers       Provider Role Specialty Phone number    Jamari Platt M.D. Referring Family Medicine [phone]    [facility] Anticoagulation Services   [phone]            Refer to Patient Findings for HPI:  Patient Findings       Negatives:  Signs/symptoms of thrombosis, Signs/symptoms of bleeding, Laboratory test error suspected, Change in health, Change in alcohol use, Change in activity, Upcoming invasive procedure, Emergency department visit, Upcoming dental procedure, Missed doses, Extra doses, Change in medications, Change in diet/appetite, Hospital admission, Bruising, Other complaints          There were no vitals filed for this visit.  The pt declined vitals today     Patient seen in clinic today for follow up on anticoagulation therapy with warfarin (a high risk medication) for hx of DVT and stroke  Verified current warfarin dosing schedule.  Patient denies any missed doses of warfarin.    Medications reconciled   Pt is not on antiplatelet therapy      A/P   INR is therapeutic today at 2.6.     Warfarin dosing recommendation: Continue with the current dosing regimen.  Patient will follow up again in 4 weeks.     Pt educated  to contact our clinic with any changes in medications or s/s of bleeding or thrombosis. Pt is aware to seek immediate medical attention for falls, head injury or deep cuts.    Follow up appointment in 4 week(s).    Next appt: Thurs, Dec 29 @ 9am     Jaswant Calzada PharmD

## 2022-12-02 LAB — INR BLD: 2.6 (ref 0.9–1.2)

## 2022-12-07 ENCOUNTER — APPOINTMENT (OUTPATIENT)
Dept: PHYSICAL THERAPY | Facility: REHABILITATION | Age: 67
End: 2022-12-07
Attending: INTERNAL MEDICINE
Payer: MEDICARE

## 2022-12-14 ENCOUNTER — APPOINTMENT (OUTPATIENT)
Dept: PHYSICAL THERAPY | Facility: REHABILITATION | Age: 67
End: 2022-12-14
Attending: INTERNAL MEDICINE
Payer: MEDICARE

## 2022-12-29 ENCOUNTER — ANTICOAGULATION VISIT (OUTPATIENT)
Dept: VASCULAR LAB | Facility: MEDICAL CENTER | Age: 67
End: 2022-12-29
Attending: NURSE PRACTITIONER
Payer: MEDICARE

## 2022-12-29 DIAGNOSIS — Z79.01 CHRONIC ANTICOAGULATION: ICD-10-CM

## 2022-12-29 LAB
INR BLD: 2.1 (ref 0.9–1.2)
INR PPP: 2.1 (ref 2–3.5)

## 2022-12-29 PROCEDURE — 99211 OFF/OP EST MAY X REQ PHY/QHP: CPT

## 2022-12-29 PROCEDURE — 85610 PROTHROMBIN TIME: CPT

## 2022-12-29 NOTE — PROGRESS NOTES
Anticoagulation Summary  As of 2022      INR goal:  2.0-3.0   TTR:  70.5 % (7.5 y)   INR used for dosin.10 (2022)   Warfarin maintenance plan:  1.25 mg (2.5 mg x 0.5) every Sat; 2.5 mg (2.5 mg x 1) all other days   Weekly warfarin total:  16.25 mg   Plan last modified:  NALINI NashPColinNColin (10/13/2022)   Next INR check:  2023   Target end date:  Indefinite    Indications    Chronic anticoagulation [Z79.01]  Deep vein thrombosis [453.40] (Resolved) [I82.409]  Stroke [434.91] (Resolved) [I63.9]  Deep vein thrombosis (DVT) (HCC) (Resolved) [I82.409]                 Anticoagulation Episode Summary       INR check location:  Anticoagulation Clinic    Preferred lab:      Send INR reminders to:      Comments:            Anticoagulation Care Providers       Provider Role Specialty Phone number    Jamari Platt M.D. Referring Family Medicine [phone]    [facility] Anticoagulation [facility]   [phone]                  Refer to Patient Findings for HPI:  Patient Findings       Positives:  Change in health (RLE numbness for the past 3 months - pt will address w/ PCP)    Negatives:  Signs/symptoms of thrombosis, Signs/symptoms of bleeding, Laboratory test error suspected, Change in alcohol use, Change in activity, Upcoming invasive procedure, Emergency department visit, Upcoming dental procedure, Missed doses, Extra doses, Change in medications, Change in diet/appetite, Hospital admission, Bruising, Other complaints            There were no vitals filed for this visit.    Verified current warfarin dosing schedule.    Medications reconciled   Pt is not on antiplatelet therapy      A/P   INR  therapeutic.     Warfarin dosing recommendation: Pt is to continue with current warfarin dosing regimen.    Pt educated to contact our clinic with any changes in medications or s/s of bleeding or thrombosis. Pt is aware to seek immediate medical attention for falls, head injury or deep cuts.    Follow up  appointment in 5 week(s).    Verito Quispe, PharmD

## 2023-01-03 ENCOUNTER — OFFICE VISIT (OUTPATIENT)
Dept: MEDICAL GROUP | Facility: PHYSICIAN GROUP | Age: 68
End: 2023-01-03
Payer: MEDICARE

## 2023-01-03 VITALS
BODY MASS INDEX: 21.67 KG/M2 | RESPIRATION RATE: 16 BRPM | HEIGHT: 72 IN | TEMPERATURE: 97.6 F | OXYGEN SATURATION: 93 % | WEIGHT: 160 LBS | HEART RATE: 80 BPM

## 2023-01-03 DIAGNOSIS — I69.959 HEMIPLEGIA OF DOMINANT SIDE AS LATE EFFECT FOLLOWING CEREBROVASCULAR DISEASE (HCC): ICD-10-CM

## 2023-01-03 DIAGNOSIS — J02.9 ACUTE PHARYNGITIS, UNSPECIFIED ETIOLOGY: ICD-10-CM

## 2023-01-03 DIAGNOSIS — Z89.519 S/P BKA (BELOW KNEE AMPUTATION) UNILATERAL (HCC): ICD-10-CM

## 2023-01-03 DIAGNOSIS — G89.4 CHRONIC PAIN SYNDROME: ICD-10-CM

## 2023-01-03 DIAGNOSIS — J02.0 STREP PHARYNGITIS: ICD-10-CM

## 2023-01-03 LAB
INT CON NEG: NORMAL
INT CON POS: NORMAL
S PYO AG THROAT QL: POSITIVE

## 2023-01-03 PROCEDURE — 99214 OFFICE O/P EST MOD 30 MIN: CPT

## 2023-01-03 PROCEDURE — 87880 STREP A ASSAY W/OPTIC: CPT

## 2023-01-03 RX ORDER — GABAPENTIN 100 MG/1
100 CAPSULE ORAL 3 TIMES DAILY
Qty: 300 CAPSULE | Refills: 3 | Status: SHIPPED | OUTPATIENT
Start: 2023-01-03 | End: 2024-01-08 | Stop reason: SDUPTHER

## 2023-01-03 RX ORDER — AMOXICILLIN 500 MG/1
500 CAPSULE ORAL 2 TIMES DAILY
Qty: 20 CAPSULE | Refills: 0 | Status: SHIPPED | OUTPATIENT
Start: 2023-01-03 | End: 2023-01-13

## 2023-01-03 ASSESSMENT — FIBROSIS 4 INDEX: FIB4 SCORE: 2.09

## 2023-01-03 ASSESSMENT — PATIENT HEALTH QUESTIONNAIRE - PHQ9: CLINICAL INTERPRETATION OF PHQ2 SCORE: 0

## 2023-01-03 NOTE — PROGRESS NOTES
Chief Complaint   Patient presents with    Leg Pain     L leg x 1 week     Pharyngitis     Hard to swallow         HPI: This is a 67 y.o. patient has 1 days of sore throat, denies cough and congestion. Denies runny nose. Denies ear fullness. No abnormal shortness of breath. No nausea vomiting or diarrhea, No subjective fever and chills. No known sick exposures. No coughing up blood. No headache.  Patient has taken over-the-counter analgesics and throat lozenge with no relief.     ROS:  No fever, cough, nausea, changes in bowel movements or skin rash. Positive for lymph node swelling, sore throat, difficulty swallowing medications, increased phlegm.      I reviewed the patient's medications, allergies and medical history:  Current Outpatient Medications   Medication Sig Dispense Refill    gabapentin (NEURONTIN) 100 MG Cap Take 1 Capsule by mouth 3 times a day. 300 Capsule 3    amoxicillin (AMOXIL) 500 MG Cap Take 1 Capsule by mouth 2 times a day for 10 days. 20 Capsule 0    atorvastatin (LIPITOR) 20 MG Tab Take 1 Tablet by mouth every day. 100 Tablet 3    fenofibrate (TRICOR) 48 MG Tab Take 1 Tablet by mouth every day. 100 Tablet 3    warfarin (COUMADIN) 2.5 MG Tab TAKE 1/2 TO 1 (ONE-HALF TO ONE) TABLET BY MOUTH ONCE DAILY AS DIRECTED BY THE COUMADIN CLINIC 100 Tablet 1    potassium chloride SA (KDUR) 20 MEQ Tab CR Take 1 Tablet by mouth every day. 100 Tablet 3    Multiple Vitamins-Minerals (OCUVITE-LUTEIN) Tab Take 1 Tablet by mouth every day.      Ygbll-Vkrcjzyo-Oqnug-RsHp-Zn-C ( IMMUNE SUPPORT COMPLEX PO) Take  by mouth.      Multiple Vitamins-Minerals (CENTRUM SILVER 50+MEN) Tab Take  by mouth.      Cholecalciferol (VITAMIN D3) 2000 UNIT CAPS Take 1 Cap by mouth every day. 90 Cap 3     No current facility-administered medications for this visit.     Patient has no known allergies.  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  Eyes: PERRL, conjunctiva slightly injected, no photophobia or eye discharge.  Ears: Normal pinnae. TM's pearly gray with good landmarks bilaterally.  Nares: Patent with thin, clear mucus.  Sinuses: Non-tender over maxillary and frontal sinuses.  Throat: Erythematous injection with 2+ enlarged tonsils. Positive for exudates.   Neck: Supple, with anterior cervical lymphadenopathy.  Lungs: Good air entry bilaterally, clear to auscultation. No wheeze, rhonchi or crackles. Normal respiratory effort.  Heart: Regular rate without murmur.  Abdomen: Soft and non-tender. No hepatosplenomegaly.  Skin: Warm and dry. No rash.     Results for orders placed or performed in visit on 12/29/22   POCT Protime   Result Value Ref Range    INR 2.10 2 - 3.5   POCT INR device results   Result Value Ref Range    POC INR 2.1 (H) 0.9 - 1.2         ASSESSMENT:   1. Acute pharyngitis, unspecified etiology    2. Chronic pain syndrome    3. Hemiplegia of dominant side as late effect following cerebrovascular disease ([facility])    4. S/P BKA (below knee amputation) unilateral ([facility])    5. Strep pharyngitis          PLAN:  1. Discussed benign nature of viral upper respiratory infections.   Positive strep test in office, treat with azithromycin/PCNV.  2. OTC anti-pyretics and decongestants as needed. Supportive care advised.  3. Follow-up in office or urgent care for worsening symptoms, difficulty breathing, lack of expected recovery, or should new symptoms or problems  arise.

## 2023-01-04 DIAGNOSIS — Z79.01 CHRONIC ANTICOAGULATION: ICD-10-CM

## 2023-02-02 ENCOUNTER — ANTICOAGULATION VISIT (OUTPATIENT)
Dept: VASCULAR LAB | Facility: MEDICAL CENTER | Age: 68
End: 2023-02-02
Attending: NURSE PRACTITIONER
Payer: MEDICARE

## 2023-02-02 DIAGNOSIS — Z79.01 CHRONIC ANTICOAGULATION: ICD-10-CM

## 2023-02-02 LAB
INR BLD: 2.8 (ref 0.9–1.2)
INR PPP: 2.8 (ref 2–3.5)

## 2023-02-02 PROCEDURE — 85610 PROTHROMBIN TIME: CPT

## 2023-02-02 PROCEDURE — 99211 OFF/OP EST MAY X REQ PHY/QHP: CPT

## 2023-02-02 NOTE — PROGRESS NOTES
Anticoagulation Summary  As of 2023      INR goal:  2.0-3.0   TTR:  70.9 % (7.6 y)   INR used for dosin.80 (2023)   Warfarin maintenance plan:  1.25 mg (2.5 mg x 0.5) every Sat; 2.5 mg (2.5 mg x 1) all other days   Weekly warfarin total:  16.25 mg   No change documented:  Brianne Calzada   Plan last modified:  GAMAL Nash (10/13/2022)   Next INR check:  3/16/2023   Target end date:  Indefinite    Indications    Chronic anticoagulation [Z79.01]  Deep vein thrombosis [453.40] (Resolved) [I82.409]  Stroke [434.91] (Resolved) [I63.9]  Deep vein thrombosis (DVT) (HCC) (Resolved) [I82.409]                 Anticoagulation Episode Summary       INR check location:  Anticoagulation Clinic    Preferred lab:      Send INR reminders to:      Comments:            Anticoagulation Care Providers       Provider Role Specialty Phone number    Jamari Platt M.D. Referring Family Medicine [phone]    [facility]   [phone]          Refer to Patient Findings for HPI:  Patient Findings       Negatives:  Signs/symptoms of thrombosis, Signs/symptoms of bleeding, Laboratory test error suspected, Change in health, Change in alcohol use, Change in activity, Upcoming invasive procedure, Emergency department visit, Upcoming dental procedure, Missed doses, Extra doses, Change in medications, Change in diet/appetite, Hospital admission, Bruising, Other complaints          There were no vitals filed for this visit.  The pt declined vitals today     Patient seen in clinic today for follow up on anticoagulation therapy with warfarin (a high risk medication) for hx of DVT and stroke  Verified current warfarin dosing schedule.  Patient denies any missed doses of warfarin.    Medications reconciled   Pt is not on antiplatelet therapy      A/P   INR is therapeutic today at 2.8.     Warfarin dosing recommendation: Continue with the current dosing regimen. Patient will follow up again in 6  weeks.     Pt educated to contact our clinic with any changes in medications or s/s of bleeding or thrombosis. Pt is aware to seek immediate medical attention for falls, head injury or deep cuts.    Follow up appointment in 6 week(s).    Next appt: Thursday, March 16 @ 9am    Jaswant Calzada PharmD

## 2023-03-16 ENCOUNTER — ANTICOAGULATION VISIT (OUTPATIENT)
Dept: VASCULAR LAB | Facility: MEDICAL CENTER | Age: 68
End: 2023-03-16
Attending: INTERNAL MEDICINE
Payer: MEDICARE

## 2023-03-16 DIAGNOSIS — Z79.01 CHRONIC ANTICOAGULATION: ICD-10-CM

## 2023-03-16 LAB — INR PPP: 2.4 (ref 2–3.5)

## 2023-03-16 PROCEDURE — 85610 PROTHROMBIN TIME: CPT

## 2023-03-16 PROCEDURE — 99211 OFF/OP EST MAY X REQ PHY/QHP: CPT

## 2023-03-16 NOTE — PROGRESS NOTES
Anticoagulation Summary  As of 3/16/2023      INR goal:  2.0-3.0   TTR:  71.3 % (7.7 y)   INR used for dosin.40 (3/16/2023)   Warfarin maintenance plan:  1.25 mg (2.5 mg x 0.5) every Sat; 2.5 mg (2.5 mg x 1) all other days   Weekly warfarin total:  16.25 mg   Plan last modified:  NALINI NashPColinNColin (10/13/2022)   Next INR check:  2023   Target end date:  Indefinite    Indications    Chronic anticoagulation [Z79.01]  Deep vein thrombosis [453.40] (Resolved) [I82.409]  Stroke [434.91] (Resolved) [I63.9]  Deep vein thrombosis (DVT) (HCC) (Resolved) [I82.409]                 Anticoagulation Episode Summary       INR check location:  Anticoagulation Clinic    Preferred lab:      Send INR reminders to:      Comments:            Anticoagulation Care Providers       Provider Role Specialty Phone number    Jamari Platt M.D. Referring Family Medicine [phone]    [facility] Anticoagulation Services   [phone]                  Refer to Patient Findings for HPI:  Patient Findings       Negatives:  Signs/symptoms of thrombosis, Signs/symptoms of bleeding, Laboratory test error suspected, Change in health, Change in alcohol use, Change in activity, Upcoming invasive procedure, Emergency department visit, Upcoming dental procedure, Missed doses, Extra doses, Change in medications, Change in diet/appetite, Hospital admission, Bruising, Other complaints            There were no vitals filed for this visit.   pt declined vitals    Verified current warfarin dosing schedule.    Pt is not on antiplatelet therapy    A/P   INR therapeutic.     Warfarin dosing recommendation: Pt is to continue with current warfarin dosing regimen.    Pt educated to contact our clinic with any changes in medications or s/s of bleeding or thrombosis. Pt is aware to seek immediate medical attention for falls, head injury or deep cuts.    Follow up appointment in 6 week(s).    Jacqui Moraes, Pharmacy Intern  Verito Quispe, PharmD

## 2023-04-06 ENCOUNTER — OFFICE VISIT (OUTPATIENT)
Dept: MEDICAL GROUP | Facility: PHYSICIAN GROUP | Age: 68
End: 2023-04-06
Payer: MEDICARE

## 2023-04-06 VITALS
BODY MASS INDEX: 21.67 KG/M2 | TEMPERATURE: 97.6 F | HEIGHT: 72 IN | WEIGHT: 160 LBS | HEART RATE: 84 BPM | OXYGEN SATURATION: 95 %

## 2023-04-06 DIAGNOSIS — G89.29 CHRONIC LEFT-SIDED LOW BACK PAIN, UNSPECIFIED WHETHER SCIATICA PRESENT: ICD-10-CM

## 2023-04-06 DIAGNOSIS — I69.959 HEMIPLEGIA OF DOMINANT SIDE AS LATE EFFECT FOLLOWING CEREBROVASCULAR DISEASE (HCC): ICD-10-CM

## 2023-04-06 DIAGNOSIS — M79.605 LEFT LEG PAIN: ICD-10-CM

## 2023-04-06 DIAGNOSIS — H61.23 BILATERAL IMPACTED CERUMEN: ICD-10-CM

## 2023-04-06 DIAGNOSIS — Z99.3 WHEELCHAIR DEPENDENCE: ICD-10-CM

## 2023-04-06 DIAGNOSIS — I69.919 COGNITIVE DEFICITS AS LATE EFFECT OF CEREBROVASCULAR DISEASE: ICD-10-CM

## 2023-04-06 DIAGNOSIS — Z86.73 H/O: CVA (CEREBROVASCULAR ACCIDENT): ICD-10-CM

## 2023-04-06 DIAGNOSIS — E78.5 DYSLIPIDEMIA (HIGH LDL; LOW HDL): Chronic | ICD-10-CM

## 2023-04-06 DIAGNOSIS — H91.93 DECREASED HEARING OF BOTH EARS: ICD-10-CM

## 2023-04-06 DIAGNOSIS — Z89.519 S/P BKA (BELOW KNEE AMPUTATION) UNILATERAL (HCC): ICD-10-CM

## 2023-04-06 DIAGNOSIS — M54.50 CHRONIC LEFT-SIDED LOW BACK PAIN, UNSPECIFIED WHETHER SCIATICA PRESENT: ICD-10-CM

## 2023-04-06 PROCEDURE — 99214 OFFICE O/P EST MOD 30 MIN: CPT

## 2023-04-06 ASSESSMENT — ENCOUNTER SYMPTOMS
SENSORY CHANGE: 1
BACK PAIN: 1

## 2023-04-06 ASSESSMENT — FIBROSIS 4 INDEX: FIB4 SCORE: 2.09

## 2023-04-06 NOTE — ASSESSMENT & PLAN NOTE
Chronic, unstable. Endorses he has been using at home ear wax softener and rinses but feels like he has ear wax build up. Requesting lavage.

## 2023-04-06 NOTE — PROGRESS NOTES
Subjective:     CC: Diagnoses of Left leg pain, Cognitive deficits as late effect of cerebrovascular disease, Hemiplegia of dominant side as late effect following cerebrovascular disease (HCC), Wheelchair bound, H/O: CVA (cerebrovascular accident), S/P BKA (below knee amputation) unilateral (HCC), Decreased hearing of both ears, Bilateral impacted cerumen, Chronic left-sided low back pain, unspecified whether sciatica present, and Dyslipidemia (high LDL; low HDL) were pertinent to this visit.  Drew presents accompanied by his friend Eris. He did endorse getting a new wheel chair, however it is making his back hurt, and rubbing his remaining leg causing skin abrasion to lateral calf.   Reports he got a new hymnal which he is enjoying.      1/3/23 I saw him for acute pharyngitis, positive strep, treated with amoxicillin effectively with resolved symptoms.     HPI:   Drew presents today with    Problem   Left Leg Pain   Chronic Left-Sided Low Back Pain   Bilateral Impacted Cerumen   Dyslipidemia (High Ldl; Low Hdl)   Decreased Hearing of Both Ears (Resolved)     ROS:  Review of Systems   HENT:          Ear wax build up   Musculoskeletal:  Positive for back pain (r/t new wheel chair, uncomfortable).   Neurological:  Positive for sensory change (increasing numbness to left leg).   All other systems reviewed and are negative.    Objective:     Exam:  Pulse 84   Temp 36.4 °C (97.6 °F) (Temporal)   Ht 1.829 m (6') Comment: per pt wheelchair  Wt 72.6 kg (160 lb) Comment: per pt wheelchair  SpO2 95%   BMI 21.70 kg/m²  Body mass index is 21.7 kg/m².    Physical Exam  Vitals reviewed.   HENT:      Right Ear: There is impacted cerumen.      Left Ear: There is impacted cerumen.   Cardiovascular:      Rate and Rhythm: Normal rate and regular rhythm.      Pulses:           Posterior tibial pulses are 0 on the left side.   Pulmonary:      Effort: Pulmonary effort is normal. No respiratory distress.      Breath sounds: Normal  breath sounds.   Abdominal:      General: Bowel sounds are normal.   Musculoskeletal:      Left lower leg: No edema.      Right Lower Extremity: Right leg is amputated below knee.   Skin:     Capillary Refill: Capillary refill takes less than 2 seconds.      Findings: Lesion (left lateral calf skin abraison) present.   Neurological:      Mental Status: He is alert. Mental status is at baseline.   Psychiatric:         Mood and Affect: Mood normal.         Behavior: Behavior normal.     Labs: mild atherosclerosis noted in AAA screening 11/8/22    Assessment & Plan:     67 y.o. male with the following -     Problem List Items Addressed This Visit       Dyslipidemia (high LDL; low HDL) (Chronic)     Chronic, stable.  Continue fenofibrate 48 mg daily.  Continue atorvastatin 20 mg daily.         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 Endorses he has been using at home ear wax softener and rinses but feels like he has ear wax build up. Requesting lavage.         Relevant Orders    Ear Irrigation (MA Only)    Left leg pain     Chronic, unstable. Reports worsening pain to LLE, numbness from knee down is increasing. No sensation to medial calf to touch, does endorse sensation to lateral knee to touch. Has obtained sores to lateral calf secondary to leg  rubbing against wheel chair. Sore appears with primary intention, no indication of infection or poor healing. No swelling, redness, or other sores noted to extremity.  Continues with anticoagulation.  Suggested increasing gabapentin for the pain and numbness, pt declines.  Continue gabapentin 100 mg 3 times daily         Relevant Orders    Referral to Physical Therapy    Chronic left-sided low back pain     Pt complains of increasing back pain with new wheel chair.          RESOLVED: Decreased hearing of both ears     Chronic, unstable. Reports           Supportive care, differential diagnoses, and indications for immediate follow-up discussed with patient.    Pathogenesis of diagnosis discussed including typical length and natural progression.    Instructed to return to clinic or nearest emergency department for any change in condition, further concerns, or worsening of symptoms.  Patient states understanding of the plan of care and discharge instructions.    I have placed the below orders and discussed them with an approved delegating provider.  The MA is performing the below orders under the direction of Dr. Singh.    I spent a total of 22 minutes with record review, exam, communication with the patient, communication with other providers, and documentation of this encounter.    Return in about 6 months (around 10/6/2023) for Annual Wellness.    Please note that this dictation was created using voice recognition software. I have made every reasonable attempt to correct obvious errors, but I expect that there are errors of grammar and possibly content that I did not discover before finalizing the note.

## 2023-04-06 NOTE — ASSESSMENT & PLAN NOTE
Chronic, unstable. Reports worsening pain to LLE, numbness from knee down is increasing. No sensation to medial calf to touch, does endorse sensation to lateral knee to touch. Has obtained sores to lateral calf secondary to leg rubbing against wheel chair. Sore appears with primary intention, no indication of infection or poor healing. No swelling, redness, or other sores noted to extremity.  Continues with anticoagulation.  Suggested increasing gabapentin for the pain and numbness, pt declines.  Continue gabapentin 100 mg 3 times daily

## 2023-04-26 ENCOUNTER — OFFICE VISIT (OUTPATIENT)
Dept: MEDICAL GROUP | Facility: PHYSICIAN GROUP | Age: 68
End: 2023-04-26
Payer: MEDICARE

## 2023-04-26 VITALS
HEART RATE: 81 BPM | DIASTOLIC BLOOD PRESSURE: 74 MMHG | OXYGEN SATURATION: 94 % | HEIGHT: 72 IN | SYSTOLIC BLOOD PRESSURE: 113 MMHG | WEIGHT: 160 LBS | RESPIRATION RATE: 18 BRPM | TEMPERATURE: 97.9 F | BODY MASS INDEX: 21.67 KG/M2

## 2023-04-26 DIAGNOSIS — I69.919 COGNITIVE DEFICITS AS LATE EFFECT OF CEREBROVASCULAR DISEASE: ICD-10-CM

## 2023-04-26 PROCEDURE — 99212 OFFICE O/P EST SF 10 MIN: CPT

## 2023-04-26 ASSESSMENT — FIBROSIS 4 INDEX: FIB4 SCORE: 2.09

## 2023-04-26 NOTE — LETTER
[facility]  [address]  [place] [address]     April 26, 2023    Patient: Drew Melton   YOB: 1955   Date of Visit: 4/26/2023       To Whom It May Concern:    Drew Melton has been a patient of mine since 10/3/2022. It is my recommendation he be exempt from Jury Duty given his severe physical limitations and history of CVA with remaining cognitive deficits. He has right lower extremity amputation, and is wheel chair bound, with reduced vascular flow to remaining leg, with high risk for infection. He is in the care of a full time caregiver to assist him with daily living.   Please do not hesitate to contact me if you have further questions regarding his limitations.     Sincerely,         MARCELO Albright.

## 2023-04-26 NOTE — ASSESSMENT & PLAN NOTE
Chronic, stable. Requesting exemption recommendation for jury summons  Provided letter recommending exemption

## 2023-04-26 NOTE — PROGRESS NOTES
Subjective:     CC: The encounter diagnosis was Cognitive deficits as late effect of cerebrovascular disease.    HPI:   Drew presents today with    Problem   Cognitive Deficits As Late Effect of Cerebrovascular Disease    ICD-10 transition       ROS:  Review of Systems   All other systems reviewed and are negative.    Objective:     Exam:  /74 (BP Location: Left arm, Patient Position: Sitting, BP Cuff Size: Small adult)   Pulse 81   Temp 36.6 °C (97.9 °F) (Temporal)   Resp 18   Ht 1.829 m (6') Comment: PER PT  Wt 72.6 kg (160 lb) Comment: PER PT  SpO2 94%   BMI 21.70 kg/m²  Body mass index is 21.7 kg/m².    Physical Exam  Constitutional:       Appearance: Normal appearance. He is well-groomed.   Cardiovascular:      Rate and Rhythm: Normal rate.   Pulmonary:      Effort: Pulmonary effort is normal. No respiratory distress.   Skin:     General: Skin is warm and dry.   Neurological:      Mental Status: He is alert. Mental status is at baseline.   Psychiatric:         Mood and Affect: Mood normal.         Behavior: Behavior normal.       Assessment & Plan:     67 y.o. male with the following -     Problem List Items Addressed This Visit       Cognitive deficits as late effect of cerebrovascular disease     Chronic, stable. Requesting exemption recommendation for jury summons  Provided letter recommending exemption          Supportive care, differential diagnoses, and indications for immediate follow-up discussed with patient.    Pathogenesis of diagnosis discussed including typical length and natural progression.    Instructed to return to clinic or nearest emergency department for any change in condition, further concerns, or worsening of symptoms.  Patient states understanding of the plan of care and discharge instructions.    I have placed the below orders and discussed them with an approved delegating provider.  The MA is performing the below orders under the direction of Dr. Singh.    I spent a  total of 7 minutes with record review, exam, communication with the patient, communication with other providers, and documentation of this encounter.    Return if symptoms worsen or fail to improve.    Please note that this dictation was created using voice recognition software. I have made every reasonable attempt to correct obvious errors, but I expect that there are errors of grammar and possibly content that I did not discover before finalizing the note.

## 2023-04-27 ENCOUNTER — ANTICOAGULATION VISIT (OUTPATIENT)
Dept: VASCULAR LAB | Facility: MEDICAL CENTER | Age: 68
End: 2023-04-27
Attending: INTERNAL MEDICINE
Payer: MEDICARE

## 2023-04-27 DIAGNOSIS — Z86.73 H/O: CVA (CEREBROVASCULAR ACCIDENT): ICD-10-CM

## 2023-04-27 DIAGNOSIS — Z79.01 CHRONIC ANTICOAGULATION: ICD-10-CM

## 2023-04-27 LAB — INR PPP: 2.4 (ref 2–3.5)

## 2023-04-27 PROCEDURE — 99211 OFF/OP EST MAY X REQ PHY/QHP: CPT | Performed by: NURSE PRACTITIONER

## 2023-04-27 PROCEDURE — 85610 PROTHROMBIN TIME: CPT

## 2023-04-27 NOTE — PROGRESS NOTES
Anticoagulation Summary  As of 2023      INR goal:  2.0-3.0   TTR:  71.8 % (7.9 y)   INR used for dosin.40 (2023)   Warfarin maintenance plan:  1.25 mg (2.5 mg x 0.5) every Sat; 2.5 mg (2.5 mg x 1) all other days   Weekly warfarin total:  16.25 mg   No change documented:  GAMAL Nash   Plan last modified:  GAMAL Nash (10/13/2022)   Next INR check:  6/15/2023   Target end date:  Indefinite    Indications    Chronic anticoagulation [Z79.01]  Deep vein thrombosis [453.40] (Resolved) [I82.409]  Stroke [434.91] (Resolved) [I63.9]  Deep vein thrombosis (DVT) (HCC) (Resolved) [I82.409]                 Anticoagulation Episode Summary       INR check location:  Anticoagulation Clinic    Preferred lab:      Send INR reminders to:      Comments:            Anticoagulation Care Providers       Provider Role Specialty Phone number    Jamari Platt M.D. Referring Family Medicine [phone]    [facility] Anticoagulation Services   [phone]                  Refer to Patient Findings for HPI:  Patient Findings       Negatives:  Signs/symptoms of thrombosis, Signs/symptoms of bleeding, Laboratory test error suspected, Change in health, Change in alcohol use, Change in activity, Upcoming invasive procedure, Emergency department visit, Upcoming dental procedure, Missed doses, Extra doses, Change in medications, Change in diet/appetite, Hospital admission, Bruising, Other complaints            There were no vitals filed for this visit.    Verified current warfarin dosing schedule.    Medications reconciled   Pt is not on antiplatelet therapy      A/P   INR  -therapeutic.     Warfarin dosing recommendation: Continue current regimen.    Pt educated to contact our clinic with any changes in medications or s/s of bleeding or thrombosis. Pt is aware to seek immediate medical attention for falls, head injury or deep cuts.    Follow up appointment in 7 week(s).    GAMAL Nash

## 2023-06-13 ENCOUNTER — TELEPHONE (OUTPATIENT)
Dept: HEALTH INFORMATION MANAGEMENT | Facility: OTHER | Age: 68
End: 2023-06-13

## 2023-06-15 ENCOUNTER — ANTICOAGULATION VISIT (OUTPATIENT)
Dept: VASCULAR LAB | Facility: MEDICAL CENTER | Age: 68
End: 2023-06-15
Attending: INTERNAL MEDICINE
Payer: MEDICARE

## 2023-06-15 DIAGNOSIS — Z79.01 CHRONIC ANTICOAGULATION: ICD-10-CM

## 2023-06-15 LAB — INR PPP: 2.3 (ref 2–3.5)

## 2023-06-15 PROCEDURE — 85610 PROTHROMBIN TIME: CPT

## 2023-06-15 PROCEDURE — 99211 OFF/OP EST MAY X REQ PHY/QHP: CPT

## 2023-06-15 NOTE — PROGRESS NOTES
Anticoagulation Summary  As of 6/15/2023      INR goal:  2.0-3.0   TTR:  72.2 % (8 y)   INR used for dosin.30 (6/15/2023)   Warfarin maintenance plan:  1.25 mg (2.5 mg x 0.5) every Sat; 2.5 mg (2.5 mg x 1) all other days   Weekly warfarin total:  16.25 mg   Plan last modified:  Khushboo Wiseman A.P.NColin (10/13/2022)   Next INR check:  8/10/2023   Target end date:  Indefinite    Indications    Chronic anticoagulation [Z79.01]  Deep vein thrombosis [453.40] (Resolved) [I82.409]  Stroke [434.91] (Resolved) [I63.9]  Deep vein thrombosis (DVT) (HCC) (Resolved) [I82.409]                 Anticoagulation Episode Summary       INR check location:  Anticoagulation Clinic    Preferred lab:      Send INR reminders to:      Comments:            Anticoagulation Care Providers       Provider Role Specialty Phone number    Jamari Platt M.D. Referring Endocrinology [phone]    [facility]   [phone]                  Refer to Patient Findings for HPI:  Patient Findings       Negatives:  Signs/symptoms of thrombosis, Signs/symptoms of bleeding, Laboratory test error suspected, Change in health, Change in alcohol use, Change in activity, Upcoming invasive procedure, Emergency department visit, Upcoming dental procedure, Missed doses, Extra doses, Change in medications, Change in diet/appetite, Hospital admission, Bruising, Other complaints            There were no vitals filed for this visit.   pt declined vitals    Verified current warfarin dosing schedule.    Medications reconciled   Pt is not on antiplatelet therapy      A/P   INR  therapeutic.     Warfarin dosing recommendation: Pt is to continue with current warfarin dosing regimen.    Pt educated to contact our clinic with any changes in medications or s/s of bleeding or thrombosis. Pt is aware to seek immediate medical attention for falls, head injury or deep cuts.    Follow up appointment in 8 week(s).    Verito Quispe, LyndseyD

## 2023-08-10 ENCOUNTER — ANTICOAGULATION VISIT (OUTPATIENT)
Dept: VASCULAR LAB | Facility: MEDICAL CENTER | Age: 68
End: 2023-08-10
Attending: INTERNAL MEDICINE
Payer: MEDICARE

## 2023-08-10 DIAGNOSIS — Z79.01 CHRONIC ANTICOAGULATION: ICD-10-CM

## 2023-08-10 LAB — INR PPP: 2.2 (ref 2–3.5)

## 2023-08-10 PROCEDURE — 85610 PROTHROMBIN TIME: CPT

## 2023-08-10 PROCEDURE — 99211 OFF/OP EST MAY X REQ PHY/QHP: CPT | Performed by: NURSE PRACTITIONER

## 2023-08-10 NOTE — PROGRESS NOTES
Anticoagulation Summary  As of 8/10/2023      INR goal:  2.0-3.0   TTR:  72.8 % (8.1 y)   INR used for dosin.20 (8/10/2023)   Warfarin maintenance plan:  1.25 mg (2.5 mg x 0.5) every Sat; 2.5 mg (2.5 mg x 1) all other days   Weekly warfarin total:  16.25 mg   No change documented:  NALINI NashPALLY   Plan last modified:  BABAK Nash.P.NColin (10/13/2022)   Next INR check:  10/12/2023   Target end date:  Indefinite    Indications    Chronic anticoagulation [Z79.01]  Deep vein thrombosis [453.40] (Resolved) [I82.409]  Stroke [434.91] (Resolved) [I63.9]  Deep vein thrombosis (DVT) (HCC) (Resolved) [I82.409]                 Anticoagulation Episode Summary       INR check location:  Anticoagulation Clinic    Preferred lab:      Send INR reminders to:      Comments:            Anticoagulation Care Providers       Provider Role Specialty Phone number    Jamari Platt M.D. Referring Endocrinology [phone]    [facility]   [phone]                  Refer to Patient Findings for HPI:  Patient Findings       Negatives:  Signs/symptoms of thrombosis, Signs/symptoms of bleeding, Laboratory test error suspected, Change in health, Change in alcohol use, Change in activity, Upcoming invasive procedure, Emergency department visit, Upcoming dental procedure, Missed doses, Extra doses, Change in medications, Change in diet/appetite, Hospital admission, Bruising, Other complaints            There were no vitals filed for this visit.   pt declined vitals    Verified current warfarin dosing schedule.    Medications reconciled   Pt is not on antiplatelet therapy      A/P   INR  -therapeutic.     Warfarin dosing recommendation: Continue current regimen.    Pt educated to contact our clinic with any changes in medications or s/s of bleeding or thrombosis. Pt is aware to seek immediate medical attention for falls, head injury or deep cuts.    Follow up appointment in 9 week(s).    Khushboo Wiseman  GAMAL

## 2023-09-23 ENCOUNTER — APPOINTMENT (OUTPATIENT)
Dept: RADIOLOGY | Facility: MEDICAL CENTER | Age: 68
End: 2023-09-23
Attending: EMERGENCY MEDICINE
Payer: MEDICARE

## 2023-09-23 ENCOUNTER — HOSPITAL ENCOUNTER (EMERGENCY)
Facility: MEDICAL CENTER | Age: 68
End: 2023-09-23
Attending: EMERGENCY MEDICINE
Payer: MEDICARE

## 2023-09-23 VITALS
RESPIRATION RATE: 16 BRPM | HEIGHT: 72 IN | TEMPERATURE: 98.4 F | OXYGEN SATURATION: 94 % | WEIGHT: 130 LBS | HEART RATE: 95 BPM | SYSTOLIC BLOOD PRESSURE: 158 MMHG | BODY MASS INDEX: 17.61 KG/M2 | DIASTOLIC BLOOD PRESSURE: 85 MMHG

## 2023-09-23 DIAGNOSIS — K64.9 HEMORRHOIDS, UNSPECIFIED HEMORRHOID TYPE: ICD-10-CM

## 2023-09-23 DIAGNOSIS — K57.92 DIVERTICULITIS: ICD-10-CM

## 2023-09-23 LAB
ALBUMIN SERPL BCP-MCNC: 4.3 G/DL (ref 3.2–4.9)
ALBUMIN/GLOB SERPL: 1.4 G/DL
ALP SERPL-CCNC: 66 U/L (ref 30–99)
ALT SERPL-CCNC: 22 U/L (ref 2–50)
ANION GAP SERPL CALC-SCNC: 11 MMOL/L (ref 7–16)
APPEARANCE UR: ABNORMAL
AST SERPL-CCNC: 25 U/L (ref 12–45)
BACTERIA #/AREA URNS HPF: NEGATIVE /HPF
BASOPHILS # BLD AUTO: 0.4 % (ref 0–1.8)
BASOPHILS # BLD: 0.03 K/UL (ref 0–0.12)
BILIRUB SERPL-MCNC: 0.8 MG/DL (ref 0.1–1.5)
BILIRUB UR QL STRIP.AUTO: NEGATIVE
BUN SERPL-MCNC: 19 MG/DL (ref 8–22)
CALCIUM ALBUM COR SERPL-MCNC: 9.4 MG/DL (ref 8.5–10.5)
CALCIUM SERPL-MCNC: 9.6 MG/DL (ref 8.5–10.5)
CHLORIDE SERPL-SCNC: 108 MMOL/L (ref 96–112)
CO2 SERPL-SCNC: 26 MMOL/L (ref 20–33)
COLOR UR: YELLOW
CREAT SERPL-MCNC: 0.89 MG/DL (ref 0.5–1.4)
EOSINOPHIL # BLD AUTO: 0.14 K/UL (ref 0–0.51)
EOSINOPHIL NFR BLD: 2 % (ref 0–6.9)
EPI CELLS #/AREA URNS HPF: NEGATIVE /HPF
ERYTHROCYTE [DISTWIDTH] IN BLOOD BY AUTOMATED COUNT: 45.6 FL (ref 35.9–50)
GFR SERPLBLD CREATININE-BSD FMLA CKD-EPI: 94 ML/MIN/1.73 M 2
GLOBULIN SER CALC-MCNC: 3 G/DL (ref 1.9–3.5)
GLUCOSE SERPL-MCNC: 100 MG/DL (ref 65–99)
GLUCOSE UR STRIP.AUTO-MCNC: NEGATIVE MG/DL
HCT VFR BLD AUTO: 42.3 % (ref 42–52)
HGB BLD-MCNC: 14.3 G/DL (ref 14–18)
HYALINE CASTS #/AREA URNS LPF: ABNORMAL /LPF
IMM GRANULOCYTES # BLD AUTO: 0.01 K/UL (ref 0–0.11)
IMM GRANULOCYTES NFR BLD AUTO: 0.1 % (ref 0–0.9)
INR PPP: 2.38 (ref 0.87–1.13)
KETONES UR STRIP.AUTO-MCNC: NEGATIVE MG/DL
LEUKOCYTE ESTERASE UR QL STRIP.AUTO: NEGATIVE
LIPASE SERPL-CCNC: 48 U/L (ref 11–82)
LYMPHOCYTES # BLD AUTO: 1.57 K/UL (ref 1–4.8)
LYMPHOCYTES NFR BLD: 22.5 % (ref 22–41)
MCH RBC QN AUTO: 30.9 PG (ref 27–33)
MCHC RBC AUTO-ENTMCNC: 33.8 G/DL (ref 32.3–36.5)
MCV RBC AUTO: 91.4 FL (ref 81.4–97.8)
MICRO URNS: ABNORMAL
MONOCYTES # BLD AUTO: 0.47 K/UL (ref 0–0.85)
MONOCYTES NFR BLD AUTO: 6.7 % (ref 0–13.4)
NEUTROPHILS # BLD AUTO: 4.76 K/UL (ref 1.82–7.42)
NEUTROPHILS NFR BLD: 68.3 % (ref 44–72)
NITRITE UR QL STRIP.AUTO: NEGATIVE
NRBC # BLD AUTO: 0 K/UL
NRBC BLD-RTO: 0 /100 WBC (ref 0–0.2)
PH UR STRIP.AUTO: 7 [PH] (ref 5–8)
PLATELET # BLD AUTO: 171 K/UL (ref 164–446)
PMV BLD AUTO: 9.2 FL (ref 9–12.9)
POTASSIUM SERPL-SCNC: 3.9 MMOL/L (ref 3.6–5.5)
PROT SERPL-MCNC: 7.3 G/DL (ref 6–8.2)
PROT UR QL STRIP: NEGATIVE MG/DL
PROTHROMBIN TIME: 26.3 SEC (ref 12–14.6)
RBC # BLD AUTO: 4.63 M/UL (ref 4.7–6.1)
RBC # URNS HPF: ABNORMAL /HPF
RBC UR QL AUTO: NEGATIVE
SODIUM SERPL-SCNC: 145 MMOL/L (ref 135–145)
SP GR UR STRIP.AUTO: 1.01
UROBILINOGEN UR STRIP.AUTO-MCNC: 0.2 MG/DL
WBC # BLD AUTO: 7 K/UL (ref 4.8–10.8)
WBC #/AREA URNS HPF: ABNORMAL /HPF

## 2023-09-23 PROCEDURE — 700117 HCHG RX CONTRAST REV CODE 255: Performed by: EMERGENCY MEDICINE

## 2023-09-23 PROCEDURE — 85025 COMPLETE CBC W/AUTO DIFF WBC: CPT

## 2023-09-23 PROCEDURE — 81001 URINALYSIS AUTO W/SCOPE: CPT

## 2023-09-23 PROCEDURE — 99285 EMERGENCY DEPT VISIT HI MDM: CPT

## 2023-09-23 PROCEDURE — 700111 HCHG RX REV CODE 636 W/ 250 OVERRIDE (IP): Mod: JZ | Performed by: EMERGENCY MEDICINE

## 2023-09-23 PROCEDURE — 74177 CT ABD & PELVIS W/CONTRAST: CPT

## 2023-09-23 PROCEDURE — 85610 PROTHROMBIN TIME: CPT

## 2023-09-23 PROCEDURE — 36415 COLL VENOUS BLD VENIPUNCTURE: CPT

## 2023-09-23 PROCEDURE — 83690 ASSAY OF LIPASE: CPT

## 2023-09-23 PROCEDURE — 96365 THER/PROPH/DIAG IV INF INIT: CPT | Mod: XU

## 2023-09-23 PROCEDURE — 80053 COMPREHEN METABOLIC PANEL: CPT

## 2023-09-23 PROCEDURE — 700105 HCHG RX REV CODE 258: Performed by: EMERGENCY MEDICINE

## 2023-09-23 RX ORDER — AMOXICILLIN AND CLAVULANATE POTASSIUM 875; 125 MG/1; MG/1
1 TABLET, FILM COATED ORAL 2 TIMES DAILY
Qty: 14 TABLET | Refills: 0 | Status: ACTIVE | OUTPATIENT
Start: 2023-09-23 | End: 2023-09-30

## 2023-09-23 RX ADMIN — AMPICILLIN AND SULBACTAM 3 G: 1; 2 INJECTION, POWDER, FOR SOLUTION INTRAMUSCULAR; INTRAVENOUS at 12:19

## 2023-09-23 RX ADMIN — IOHEXOL 100 ML: 350 INJECTION, SOLUTION INTRAVENOUS at 10:27

## 2023-09-23 ASSESSMENT — PAIN DESCRIPTION - DESCRIPTORS: DESCRIPTORS: ACHING

## 2023-09-23 ASSESSMENT — PAIN DESCRIPTION - PAIN TYPE: TYPE: ACUTE PAIN;VISCERAL PAIN

## 2023-09-23 ASSESSMENT — FIBROSIS 4 INDEX: FIB4 SCORE: 2.09

## 2023-09-23 NOTE — ED NOTES
Pt wheeled to room by ED tech, placed in gown, sitting comfortably on gurney, warm blanket provided, caretaker and DEJUAN Schulte @ bedside.  Call light within reach, gurney low & locked, fall precautions in place.    Chart up for ERP.

## 2023-09-23 NOTE — ED NOTES
Pt hypertensive @ 150-160's SBP, asymptomatic. Takes meds for HTN but did not take this morning. Well-appearing, NAD, denies headache or cardiac symptoms.

## 2023-09-23 NOTE — ED NOTES
Pt assisted in using urinal by caretaker, voided w/o difficulty. Urine collected appears clear and yellowish. Specimen sent to lab.

## 2023-09-23 NOTE — ED NOTES
Pt still unable to provide urine sample; pt & caregiver updated on need and POC.  Fall precautions remain in place, gurney low & locked, call light within reach.

## 2023-09-23 NOTE — ED NOTES
Discharge instructions given to pt including follow up with surgeon or returning if no improvement of symptoms or to return if worse. Prescription x 1 provided to pt. Electronically sent over to pt's pharmacy. Questions answered by RN. Denies any new complaints. Discharged w/stable vitals and wheeled to the lobby accompanied by caretaker..

## 2023-09-23 NOTE — ED PROVIDER NOTES
ED Provider Note    CHIEF COMPLAINT  Chief Complaint   Patient presents with    Abdominal Pain     Pt states he is having lower abdominal pain 3/10, pt states pain has been going on for few days, pt states his hemorrhoids have been bleeding when he has a bowel movement.      Leg Pain     Pt states he had a toe nail removed in his left foot, and feel like the nail is growing back causing his leg to be numb, numbness has been going on for a few months.         EXTERNAL RECORDS REVIEWED  Reviewed CT angiogram of the abdomen pelvis dated March 2019 none    HPI/ROS  LIMITATION TO HISTORY   None  OUTSIDE HISTORIAN(S):  Caregiver provided additional history    Drew Melton is a 67 y.o. male who presents for evaluation of a constellation of symptoms including bilateral lower abdominal discomfort and some paresthesias and intermittent pain to the left leg.  The patient has a history of some cognitive deficits secondary to previous stroke.  He is cared for by a friend.  He is on Coumadin due to previous thrombosis of the leg.  He has history of right below the knee amputation.  He has been compliant with his anticoagulant therapy.  He reports crampy intermittent 3 out of 10 lower abdominal pain.  No report of any surgical history to the chest abdomen pelvis.  Patient denies high fevers or chills no fever or productive cough.  No black or bloody stools hematemesis hematochezia.  No urinary symptoms such as dysuria or hematuria.    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    SURGICAL HISTORY   has a past surgical history that includes amputation, below the knee (Right,  ); other; and colonoscopy (2018).    FAMILY HISTORY  Family History   Problem Relation Age of Onset    No Known Problems Mother     No Known Problems Father     No Known Problems Maternal Grandmother     No Known Problems Maternal Grandfather     No Known Problems Paternal Grandmother     No Known Problems Paternal Grandfather     Stroke Neg Hx     Diabetes Neg Hx     Cancer Neg Hx     Heart Disease Neg Hx     Hypertension Neg Hx     Hyperlipidemia Neg Hx        SOCIAL HISTORY  Social History     Tobacco Use    Smoking status: Former     Current packs/day: 0.00     Types: Cigarettes     Quit date: 2001     Years since quittin.0    Smokeless tobacco: Never   Vaping Use    Vaping Use: Never used   Substance and Sexual Activity    Alcohol use: No     Alcohol/week: 0.0 oz     Comment: former abuser    Drug use: No    Sexual activity: Not Currently       CURRENT MEDICATIONS  Home Medications       Reviewed by Bobby Chavez R.N. (Registered Nurse) on 23 at 0725  Med List Status: Not Addressed     Medication Last Dose Status   atorvastatin (LIPITOR) 20 MG Tab  Active   Cholecalciferol (VITAMIN D3) 2000 UNIT CAPS  Active   Gprdc-Rwmuybtf-Vpgyt-RsHp-Zn-C ( IMMUNE SUPPORT COMPLEX PO)  Active   fenofibrate (TRICOR) 48 MG Tab  Active   gabapentin (NEURONTIN) 100 MG Cap  Active   Multiple Vitamins-Minerals (CENTRUM SILVER 50+MEN) Tab  Active   Multiple Vitamins-Minerals (OCUVITE-LUTEIN) Tab  Active   potassium chloride SA (KDUR) 20 MEQ Tab CR  Active   warfarin (COUMADIN) 2.5 MG Tab  Active                    ALLERGIES  No Known Allergies    PHYSICAL EXAM  VITAL SIGNS: BP (!) 149/83   Pulse (!) 101   Temp 36.4 °C (97.5 °F) (Temporal)   Resp 16   Ht 1.829 m (6')   Wt 59 kg (130 lb)   SpO2 94%   BMI 17.63 kg/m²    Pulse ox interpretation: I interpret this pulse ox as normal.  Constitutional: Alert and oriented x 3, no acute distress  HEENT: Atraumatic normocephalic, pupils are equal round  reactive to light extraocular movements are intact. The nares is clear, external ears are normal, mouth shows moist mucous membranes normal dentition for age  Neck: Supple, no JVD no tracheal deviation  Cardiovascular: Mild tachycardia no murmur rub or gallop 2+ pulses peripherally x4  Thorax & Lungs: No respiratory distress, no wheezes rales or rhonchi, No chest tenderness.   GI: Soft mild dull reproducible left lower quadrant tenderness without rebound guarding or rigidity.  Rectal exam does reveal a chronically thrombosed hemorrhoid without active bleeding or evidence of secondary infection.    Skin: Warm dry no acute rash or lesion  Musculoskeletal: Right lower extremity is notable for below the knee amputation with a clean dry and intact stump.  Left lower extremity exam is notable for bounding dorsalis pedal pulses no significant erythema or swelling negative Homans' sign.  There is some chronic venous stasis changes.  Patient has subjective diminished sensation throughout the leg.  Normal motor exam.  Neurologic: Cranial nerves III through XII are grossly intact no sensory deficit no cerebellar dysfunction   Psychiatric: Anxious        DIAGNOSTIC STUDIES / PROCEDURES    LABS  Results for orders placed or performed during the hospital encounter of 09/23/23   CBC WITH DIFFERENTIAL   Result Value Ref Range    WBC 7.0 4.8 - 10.8 K/uL    RBC 4.63 (L) 4.70 - 6.10 M/uL    Hemoglobin 14.3 14.0 - 18.0 g/dL    Hematocrit 42.3 42.0 - 52.0 %    MCV 91.4 81.4 - 97.8 fL    MCH 30.9 27.0 - 33.0 pg    MCHC 33.8 32.3 - 36.5 g/dL    RDW 45.6 35.9 - 50.0 fL    Platelet Count 171 164 - 446 K/uL    MPV 9.2 9.0 - 12.9 fL    Neutrophils-Polys 68.30 44.00 - 72.00 %    Lymphocytes 22.50 22.00 - 41.00 %    Monocytes 6.70 0.00 - 13.40 %    Eosinophils 2.00 0.00 - 6.90 %    Basophils 0.40 0.00 - 1.80 %    Immature Granulocytes 0.10 0.00 - 0.90 %    Nucleated RBC 0.00 0.00 - 0.20 /100 WBC    Neutrophils (Absolute) 4.76 1.82 - 7.42 K/uL     Lymphs (Absolute) 1.57 1.00 - 4.80 K/uL    Monos (Absolute) 0.47 0.00 - 0.85 K/uL    Eos (Absolute) 0.14 0.00 - 0.51 K/uL    Baso (Absolute) 0.03 0.00 - 0.12 K/uL    Immature Granulocytes (abs) 0.01 0.00 - 0.11 K/uL    NRBC (Absolute) 0.00 K/uL   COMP METABOLIC PANEL   Result Value Ref Range    Sodium 145 135 - 145 mmol/L    Potassium 3.9 3.6 - 5.5 mmol/L    Chloride 108 96 - 112 mmol/L    Co2 26 20 - 33 mmol/L    Anion Gap 11.0 7.0 - 16.0    Glucose 100 (H) 65 - 99 mg/dL    Bun 19 8 - 22 mg/dL    Creatinine 0.89 0.50 - 1.40 mg/dL    Calcium 9.6 8.5 - 10.5 mg/dL    Correct Calcium 9.4 8.5 - 10.5 mg/dL    AST(SGOT) 25 12 - 45 U/L    ALT(SGPT) 22 2 - 50 U/L    Alkaline Phosphatase 66 30 - 99 U/L    Total Bilirubin 0.8 0.1 - 1.5 mg/dL    Albumin 4.3 3.2 - 4.9 g/dL    Total Protein 7.3 6.0 - 8.2 g/dL    Globulin 3.0 1.9 - 3.5 g/dL    A-G Ratio 1.4 g/dL   LIPASE   Result Value Ref Range    Lipase 48 11 - 82 U/L   URINALYSIS    Specimen: Urine   Result Value Ref Range    Color Yellow     Character Cloudy (A)     Specific Gravity 1.013 <1.035    Ph 7.0 5.0 - 8.0    Glucose Negative Negative mg/dL    Ketones Negative Negative mg/dL    Protein Negative Negative mg/dL    Bilirubin Negative Negative    Urobilinogen, Urine 0.2 Negative    Nitrite Negative Negative    Leukocyte Esterase Negative Negative    Occult Blood Negative Negative    Micro Urine Req Microscopic    Prothrombin Time   Result Value Ref Range    PT 26.3 (H) 12.0 - 14.6 sec    INR 2.38 (H) 0.87 - 1.13   ESTIMATED GFR   Result Value Ref Range    GFR (CKD-EPI) 94 >60 mL/min/1.73 m 2   URINE MICROSCOPIC (W/UA)   Result Value Ref Range    WBC 0-2 (A) /hpf    RBC 0-2 (A) /hpf    Bacteria Negative None /hpf    Epithelial Cells Negative /hpf    Hyaline Cast 0-2 /lpf        RADIOLOGY  I have independently interpreted the diagnostic imaging associated with this visit and am waiting the final reading from the radiologist.   My preliminary interpretation is as  follows: Possible subtle diverticulitis  Radiologist interpretation:   CT-ABDOMEN-PELVIS WITH   Final Result      1.  Colonic diverticular kyphosis, with minimal fat stranding adjacent to one of the sigmoid diverticula. Question mild acute sigmoid diverticulitis. No diverticular abscess.   2.  Cholelithiasis.          COURSE & MEDICAL DECISION MAKING    ED Observation Status? Yes; I am placing the patient in to an observation status due to a diagnostic uncertainty as well as therapeutic intensity. Patient placed in observation status at 815 AM, 9/23/2023.     Observation plan is as follows: Establish an IV perform extensive blood testing.  Perform serial abdominal exams interpret laboratory studies discussed plan of care with patient    Upon Reevaluation, the patient's condition has: Improved; and will be discharged.    Patient discharged from ED Observation status at 1300 (Time) 9/23 (Date).     INITIAL ASSESSMENT, COURSE AND PLAN  Care Narrative:    This is a very pleasant 67-year-old gentleman presents here with some crampy lower abdominal pain more left than right.  I was worried about possible colitis diverticulitis appendicitis enteritis among other etiologies of discomfort.  He also has 2 other complaints including a none infected nonbleeding thrombosed hemorrhoid as well as some chronic paresthesias to the left lower extremity.  Patient had extensive evaluation today.  His vital signs are reassuring specifically no high fever hypotension tachycardia or hypoxia.  His CBC did not demonstrate any leukocytosis or bandemia metabolic panel is unremarkable.  Subsequent CT scan demonstrates mild early diverticulitis without complicating features such as abscess or perforation.  I performed serial abdominal exams he had no peritoneal signs did not require enteral nausea or pain medication.  He was given IV Unasyn to cover for diverticulitis.  We continued on Augmentin.  Feel the patient can be treated as an  outpatient as he has reassuring work-up here other than very mild diverticulitis without complicating features.  Regarding his hemorrhoid he will be referred to general surgery and I recommended he use Preparation H.  Regarding the chronic paresthesias to the left lower extremity there is no evidence of DVT he is already anticoagulated no evidence of infection or arterial thrombosis.  I counseled him this is a chronic issue and his primary care doctor can provide further advice.      ADDITIONAL PROBLEM LIST    DISPOSITION AND DISCUSSIONS  I have discussed management of the patient with the following physicians and YESIKA's: None    Discussion of management with other QHP or appropriate source(s): Discussed with ER pharmacist regarding antibiotic therapy    Escalation of care considered, and ultimately not performed:acute inpatient care management, however at this time, the patient is most appropriate for outpatient management    Barriers to care at this time, including but not limited to: None    Decision tools and prescription drugs considered including, but not limited to: Patient will be prescribed antimicrobials    FINAL DIAGNOSIS  1. Diverticulitis  amoxicillin-clavulanate (AUGMENTIN) 875-125 MG Tab      2. Hemorrhoids, unspecified hemorrhoid type  Referral to General Surgery               Electronically signed by: Deep Naik M.D., 9/23/2023 8:17 AM

## 2023-10-02 ENCOUNTER — TELEPHONE (OUTPATIENT)
Dept: MEDICAL GROUP | Facility: PHYSICIAN GROUP | Age: 68
End: 2023-10-02

## 2023-10-02 NOTE — TELEPHONE ENCOUNTER
1. Name: Drew Melton     Call Back Number: [phone] (home)        How would the patient prefer to be contacted with a response: Phone call OK to leave a detailed message    2. Which medication(s) is being requested?   atorvastatin (LIPITOR) 20 MG Tab    3. What is the preferred Pharmacy? [facility]     Patient was not informed they may receive a return phone call from our office with any additional questions before processing this request.

## 2023-10-12 ENCOUNTER — ANTICOAGULATION VISIT (OUTPATIENT)
Dept: VASCULAR LAB | Facility: MEDICAL CENTER | Age: 68
End: 2023-10-12
Attending: INTERNAL MEDICINE
Payer: MEDICARE

## 2023-10-12 DIAGNOSIS — Z86.73 H/O: CVA (CEREBROVASCULAR ACCIDENT): ICD-10-CM

## 2023-10-12 DIAGNOSIS — Z79.01 CHRONIC ANTICOAGULATION: ICD-10-CM

## 2023-10-12 LAB — INR PPP: 3.1 (ref 2–3.5)

## 2023-10-12 PROCEDURE — 99212 OFFICE O/P EST SF 10 MIN: CPT | Performed by: NURSE PRACTITIONER

## 2023-10-12 PROCEDURE — 85610 PROTHROMBIN TIME: CPT

## 2023-10-12 NOTE — PROGRESS NOTES
Anticoagulation Summary  As of 10/12/2023      INR goal:  2.0-3.0   TTR:  73.2 % (8.3 y)   INR used for dosing:  3.10 (10/12/2023)   Warfarin maintenance plan:  1.25 mg (2.5 mg x 0.5) every Sat; 2.5 mg (2.5 mg x 1) all other days   Weekly warfarin total:  16.25 mg   Plan last modified:  NALINI NashPColinNColin (10/13/2022)   Next INR check:  12/7/2023   Target end date:  Indefinite    Indications    Chronic anticoagulation [Z79.01]  Deep vein thrombosis [453.40] (Resolved) [I82.409]  Stroke [434.91] (Resolved) [I63.9]  Deep vein thrombosis (DVT) (HCC) (Resolved) [I82.409]                 Anticoagulation Episode Summary       INR check location:  Anticoagulation Clinic    Preferred lab:      Send INR reminders to:      Comments:            Anticoagulation Care Providers       Provider Role Specialty Phone number    Jamari Platt M.D. Referring Endocrinology [phone]    [facility]   [phone]                  Refer to Patient Findings for HPI:  Patient Findings       Positives:  Change in health, Emergency department visit    Negatives:  Signs/symptoms of thrombosis, Signs/symptoms of bleeding, Laboratory test error suspected, Change in alcohol use, Change in activity, Upcoming invasive procedure, Upcoming dental procedure, Missed doses, Extra doses, Change in medications, Change in diet/appetite, Hospital admission, Bruising, Other complaints    Comments:  He was in the ER last month for abd pain. Noted to have diverticulitis. Also found to have a non bleeding thrombosed hemorrhoid for which he was referred to surgery. No problems with bleeding. No new concerns.            There were no vitals filed for this visit.  pt declined vitals. Offered to take BP but he declined.    Verified current warfarin dosing schedule.    Medications reconciled: Yes  Pt is not on antiplatelet therapy.      A/P   INR is supratherapeutic. INR slightly high today. He is usually stable on this regimen. Will  have him take a 1 time dose decrease.    Warfarin dosing recommendation: Take 1.25 mg tonight, then resume your previous regimen.    Pt educated to contact our clinic with any changes in medications or s/s of bleeding or thrombosis. Pt is aware to seek immediate medical attention for falls, head injury or deep cuts.    Follow up appointment in 8 week(s) per patient. Recommended sooner follow up but he declines and is aware of the risks.    NALINI NashP.N.

## 2023-12-07 ENCOUNTER — ANCILLARY PROCEDURE (OUTPATIENT)
Dept: VASCULAR LAB | Facility: MEDICAL CENTER | Age: 68
End: 2023-12-07
Attending: INTERNAL MEDICINE
Payer: MEDICARE

## 2023-12-07 ENCOUNTER — TELEPHONE (OUTPATIENT)
Dept: MEDICAL GROUP | Facility: PHYSICIAN GROUP | Age: 68
End: 2023-12-07

## 2023-12-07 DIAGNOSIS — Z79.01 CHRONIC ANTICOAGULATION: ICD-10-CM

## 2023-12-07 LAB — INR PPP: 2.7 (ref 2–3.5)

## 2023-12-07 PROCEDURE — 99211 OFF/OP EST MAY X REQ PHY/QHP: CPT

## 2023-12-07 PROCEDURE — 85610 PROTHROMBIN TIME: CPT

## 2023-12-07 RX ORDER — WARFARIN SODIUM 2.5 MG/1
TABLET ORAL
Qty: 90 TABLET | Refills: 3 | Status: SHIPPED | OUTPATIENT
Start: 2023-12-07

## 2023-12-07 NOTE — PROGRESS NOTES
Anticoagulation Summary  As of 2023      INR goal:  2.0-3.0   TTR:  73.3 % (8.5 y)   INR used for dosin.70 (2023)   Warfarin maintenance plan:  1.25 mg (2.5 mg x 0.5) every Sat; 2.5 mg (2.5 mg x 1) all other days   Weekly warfarin total:  16.25 mg   Plan last modified:  NALINI NashPColinNColin (10/13/2022)   Next INR check:  2024   Target end date:  Indefinite    Indications    Chronic anticoagulation [Z79.01]  Deep vein thrombosis [453.40] (Resolved) [I82.409]  Stroke [434.91] (Resolved) [I63.9]  Deep vein thrombosis (DVT) (HCC) (Resolved) [I82.409]                 Anticoagulation Episode Summary       INR check location:  Anticoagulation Clinic    Preferred lab:      Send INR reminders to:      Comments:            Anticoagulation Care Providers       Provider Role Specialty Phone number    Jamari Platt M.D. Referring Endocrinology [phone]    [facility]   [phone]                  Refer to Patient Findings for HPI:  Patient Findings       Positives:  Signs/symptoms of bleeding (rectal bleed d/t newly diagnosed rectal mass - pending surgery with [facility]), Change in health (diagnosed w/ rectal mass)    Negatives:  Signs/symptoms of thrombosis, Laboratory test error suspected, Change in alcohol use, Change in activity, Upcoming invasive procedure, Emergency department visit, Upcoming dental procedure, Missed doses, Extra doses, Change in medications, Change in diet/appetite, Hospital admission, Bruising, Other complaints            There were no vitals filed for this visit.  Pt declined vitals    Verified current warfarin dosing schedule.    Medications reconciled: Yes  Pt is not on antiplatelet therapy.      A/P   INR is therapeutic    Warfarin dosing recommendation: Continue regimen as listed above.    Pt educated to contact our clinic with any changes in medications or s/s of bleeding or thrombosis. Pt is aware to seek immediate medical attention for falls, head  injury or deep cuts.    Request pt to return in 8 week(s). Pt agrees.    Will look into clearance for surgery for rectal mass for [facility]. Pt to contact clinic if surgery gets scheduled sooner than next INR check    Verito Quispe, PharmD    ADDENDUM 12/7 1041: Called FERMÍN ROTHMAN with Megan ALEXANDER supervisor for [facility] asking to fax clearance to our office. Verito Quispe, PharmD

## 2023-12-08 NOTE — TELEPHONE ENCOUNTER
VOICEMAIL  1. Caller Name: Eris                      Call Back Number: [phone] (home)       2. Message: Eris reports a mass was found and will need a release to get surgery.    3. Patient approves office to leave a detailed voicemail/MyChart message: yes

## 2024-01-01 ENCOUNTER — HOME CARE VISIT (OUTPATIENT)
Dept: HOSPICE | Facility: HOSPICE | Age: 69
End: 2024-01-01
Payer: MEDICARE

## 2024-01-01 ENCOUNTER — PHARMACY VISIT (OUTPATIENT)
Dept: PHARMACY | Facility: MEDICAL CENTER | Age: 69
End: 2024-01-01
Payer: COMMERCIAL

## 2024-01-01 PROCEDURE — G0299 HHS/HOSPICE OF RN EA 15 MIN: HCPCS

## 2024-01-01 PROCEDURE — G0156 HHCP-SVS OF AIDE,EA 15 MIN: HCPCS

## 2024-01-01 PROCEDURE — RXMED WILLOW AMBULATORY MEDICATION CHARGE: Performed by: REHABILITATION PRACTITIONER

## 2024-01-01 PROCEDURE — G0151 HHCP-SERV OF PT,EA 15 MIN: HCPCS

## 2024-01-01 RX ADMIN — MORPHINE SULFATE 20 MG: 100 SOLUTION ORAL at 15:30

## 2024-01-01 RX ADMIN — MORPHINE SULFATE 10 MG: 100 SOLUTION ORAL at 15:22

## 2024-01-01 SDOH — ECONOMIC STABILITY: HOUSING INSECURITY: EVIDENCE OF SMOKING MATERIAL: 0

## 2024-01-01 ASSESSMENT — ENCOUNTER SYMPTOMS
DYSPNEA ACTIVITY LEVEL: WHILE SPEAKING
PAIN LOCATION: ABD
PAIN LOCATION - PAIN QUALITY: CRAMPY
PAIN LOCATION - RELIEVING FACTORS: ROXANOL
PAIN LOCATION - PAIN QUALITY: CRAMPY
CONSTIPATION: 1
PAIN SEVERITY GOAL: 2/10
SHORTNESS OF BREATH: 1
PERSON REPORTING PAIN: PATIENT
PAIN: 1
MUSCLE WEAKNESS: 1
FATIGUES EASILY: 1
MUSCLE WEAKNESS: 1
LAST BOWEL MOVEMENT: 67023
HIGHEST PAIN SEVERITY IN PAST 24 HOURS: 7/10
PERSON REPORTING PAIN: PATIENT
PAIN LOCATION - PAIN QUALITY: CRAMPY
LAST BOWEL MOVEMENT: 67024
PAIN LOCATION - PAIN SEVERITY: 3/10
LIMITED RANGE OF MOTION: 1
LAST BOWEL MOVEMENT: 67023
PERSON REPORTING PAIN: PATIENT
PAIN LOCATION - PAIN QUALITY: SHARP
BOWEL INCONTINENCE: 1
LOWEST PAIN SEVERITY IN PAST 24 HOURS: 2/10
BOWEL INCONTINENCE: 1
MUSCLE WEAKNESS: 1
LIMITED RANGE OF MOTION: 1
PAIN LOCATION: ABDOMEN
PAIN: 1
SHORTNESS OF BREATH: 1
PAIN LOCATION - PAIN SEVERITY: 3/10
PAIN LOCATION: ABDOMEN
SLEEP QUALITY: FAIR
MUSCLE WEAKNESS: 1
DYSPNEA ACTIVITY LEVEL: WHILE SPEAKING
PAIN: 1
FATIGUES EASILY: 1
LIMITED RANGE OF MOTION: 1
PAIN LOCATION - PAIN SEVERITY: 7/10
LIMITED RANGE OF MOTION: 1
PAIN LOCATION - PAIN SEVERITY: 7/10
ORTHOPNEA: 1
PAIN: 1
PERSON REPORTING PAIN: PATIENT

## 2024-01-01 ASSESSMENT — PAIN SCALES - PAIN ASSESSMENT IN ADVANCED DEMENTIA (PAINAD)
TOTALSCORE: 8
BODYLANGUAGE: 2 - RIGID. FISTS CLENCHED. KNEES PULLED UP. PULLING OR PUSHING AWAY. STRIKING OUT.
FACIALEXPRESSION: 1 - SAD. FRIGHTENED. FROWN.
NEGVOCALIZATION: 1 - OCCASIONAL MOAN OR GROAN. LOW-LEVEL SPEECH WITH A NEGATIVE OR DISAPPROVING QUALITY.
CONSOLABILITY: 2 - UNABLE TO CONSOLE, DISTRACT OR REASSURE.

## 2024-01-01 ASSESSMENT — ACTIVITIES OF DAILY LIVING (ADL)
AMBULATION ASSISTANCE: NON-AMBULATORY
AMBULATION ASSISTANCE: NON-AMBULATORY
MONEY MANAGEMENT (EXPENSES/BILLS): TOTALLY DEPENDENT
AMBULATION ASSISTANCE: NON-AMBULATORY
AMBULATION ASSISTANCE: NON-AMBULATORY
CURRENT_FUNCTION: TWO PERSON

## 2024-01-01 NOTE — PROGRESS NOTES
Anticoagulation Summary  As of 2021    INR goal:  2.0-3.0   TTR:  69.9 % (6.4 y)   INR used for dosin.80 (2021)   Warfarin maintenance plan:  2.5 mg (2.5 mg x 1) every day   Weekly warfarin total:  17.5 mg   Plan last modified:  GAMAL Nash (2021)   Next INR check:  2022   Target end date:  Indefinite    Indications    Chronic anticoagulation [Z79.01]  Deep vein thrombosis [453.40] (Resolved) [I82.409]  Stroke [434.91] (Resolved) [I63.9]  Deep vein thrombosis (DVT) (HCC) (Resolved) [I82.409]             Anticoagulation Episode Summary     INR check location:  Anticoagulation Clinic    Preferred lab:      Send INR reminders to:      Comments:        Anticoagulation Care Providers     Provider Role Specialty Phone number    Jamari Platt M.D. Referring Family Medicine [phone]    [facility]   [phone]                Refer to Patient Findings for HPI:  Patient Findings     Negatives:  Signs/symptoms of thrombosis, Signs/symptoms of bleeding, Laboratory test error suspected, Change in health, Change in alcohol use, Change in activity, Upcoming invasive procedure, Emergency department visit, Upcoming dental procedure, Missed doses, Extra doses, Change in medications, Change in diet/appetite, Hospital admission, Bruising, Other complaints          There were no vitals filed for this visit.   pt declined vitals    Verified current warfarin dosing schedule.    Medications reconciled   Pt is not on antiplatelet therapy      A/P   INR  therapeutic.     Warfarin dosing recommendation: Instructed pt to continue on with current regimen.    Pt educated to contact our clinic with any changes in medications or s/s of bleeding or thrombosis. Pt is aware to seek immediate medical attention for falls, head injury or deep cuts.    Follow up appointment in 12 week(s).    Musa Deluca, LyndseyD    
Statement Selected

## 2024-01-04 DIAGNOSIS — G89.4 CHRONIC PAIN SYNDROME: ICD-10-CM

## 2024-01-04 DIAGNOSIS — I69.959 HEMIPLEGIA OF DOMINANT SIDE AS LATE EFFECT FOLLOWING CEREBROVASCULAR DISEASE (HCC): ICD-10-CM

## 2024-01-04 RX ORDER — GABAPENTIN 100 MG/1
100 CAPSULE ORAL 3 TIMES DAILY
Qty: 300 CAPSULE | Refills: 0 | Status: CANCELLED | OUTPATIENT
Start: 2024-01-04

## 2024-01-08 DIAGNOSIS — G89.4 CHRONIC PAIN SYNDROME: ICD-10-CM

## 2024-01-08 DIAGNOSIS — I69.959 HEMIPLEGIA OF DOMINANT SIDE AS LATE EFFECT FOLLOWING CEREBROVASCULAR DISEASE (HCC): ICD-10-CM

## 2024-01-08 RX ORDER — GABAPENTIN 100 MG/1
100 CAPSULE ORAL 3 TIMES DAILY
Qty: 300 CAPSULE | Refills: 3 | Status: SHIPPED | OUTPATIENT
Start: 2024-01-08 | End: 2024-01-16

## 2024-01-10 ENCOUNTER — APPOINTMENT (OUTPATIENT)
Dept: ADMISSIONS | Facility: MEDICAL CENTER | Age: 69
End: 2024-01-10
Attending: SURGERY
Payer: MEDICARE

## 2024-01-15 DIAGNOSIS — Z79.01 CHRONIC ANTICOAGULATION: ICD-10-CM

## 2024-01-16 ENCOUNTER — PRE-ADMISSION TESTING (OUTPATIENT)
Dept: ADMISSIONS | Facility: MEDICAL CENTER | Age: 69
End: 2024-01-16
Attending: SURGERY
Payer: MEDICARE

## 2024-01-16 ENCOUNTER — TELEPHONE (OUTPATIENT)
Dept: VASCULAR LAB | Facility: MEDICAL CENTER | Age: 69
End: 2024-01-16
Payer: MEDICARE

## 2024-01-16 NOTE — TELEPHONE ENCOUNTER
Caller:   Eris (friend)    Topic/issue: Eris would like to know if Drew should be stopping his warfarin before his upcoming surgery, also would like to know if anything else needs to happen before his surgery.     Callback Number:  [phone]    Thank you,   Cindy FREED

## 2024-01-16 NOTE — TELEPHONE ENCOUNTER
Spoke with Eris - pt will need to hold warfarin 5 days prior to biopsy on 2/6. He states last DVT and stroke was in 2001, therefore will not need to bridge w/ Lovenox.    F/u INR on 2/1    Verito Quispe, LyndseyD

## 2024-01-19 ENCOUNTER — PRE-ADMISSION TESTING (OUTPATIENT)
Dept: ADMISSIONS | Facility: MEDICAL CENTER | Age: 69
End: 2024-01-19
Attending: SURGERY
Payer: MEDICARE

## 2024-01-19 DIAGNOSIS — Z01.810 PRE-OPERATIVE CARDIOVASCULAR EXAMINATION: ICD-10-CM

## 2024-01-19 DIAGNOSIS — Z01.812 PRE-OPERATIVE LABORATORY EXAMINATION: ICD-10-CM

## 2024-01-19 LAB
ALBUMIN SERPL BCP-MCNC: 4 G/DL (ref 3.2–4.9)
ALBUMIN/GLOB SERPL: 1.3 G/DL
ALP SERPL-CCNC: 87 U/L (ref 30–99)
ALT SERPL-CCNC: 16 U/L (ref 2–50)
ANION GAP SERPL CALC-SCNC: 12 MMOL/L (ref 7–16)
AST SERPL-CCNC: 17 U/L (ref 12–45)
BILIRUB SERPL-MCNC: 1.4 MG/DL (ref 0.1–1.5)
BUN SERPL-MCNC: 24 MG/DL (ref 8–22)
CALCIUM ALBUM COR SERPL-MCNC: 9.2 MG/DL (ref 8.5–10.5)
CALCIUM SERPL-MCNC: 9.2 MG/DL (ref 8.5–10.5)
CHLORIDE SERPL-SCNC: 107 MMOL/L (ref 96–112)
CO2 SERPL-SCNC: 23 MMOL/L (ref 20–33)
CREAT SERPL-MCNC: 0.8 MG/DL (ref 0.5–1.4)
EKG IMPRESSION: NORMAL
ERYTHROCYTE [DISTWIDTH] IN BLOOD BY AUTOMATED COUNT: 44.5 FL (ref 35.9–50)
GFR SERPLBLD CREATININE-BSD FMLA CKD-EPI: 97 ML/MIN/1.73 M 2
GLOBULIN SER CALC-MCNC: 3 G/DL (ref 1.9–3.5)
GLUCOSE SERPL-MCNC: 104 MG/DL (ref 65–99)
HCT VFR BLD AUTO: 43 % (ref 42–52)
HGB BLD-MCNC: 15 G/DL (ref 14–18)
MCH RBC QN AUTO: 30.9 PG (ref 27–33)
MCHC RBC AUTO-ENTMCNC: 34.9 G/DL (ref 32.3–36.5)
MCV RBC AUTO: 88.5 FL (ref 81.4–97.8)
PLATELET # BLD AUTO: 175 K/UL (ref 164–446)
PMV BLD AUTO: 9.4 FL (ref 9–12.9)
POTASSIUM SERPL-SCNC: 3.4 MMOL/L (ref 3.6–5.5)
PROT SERPL-MCNC: 7 G/DL (ref 6–8.2)
RBC # BLD AUTO: 4.86 M/UL (ref 4.7–6.1)
SODIUM SERPL-SCNC: 142 MMOL/L (ref 135–145)
WBC # BLD AUTO: 6.6 K/UL (ref 4.8–10.8)

## 2024-01-19 PROCEDURE — 85027 COMPLETE CBC AUTOMATED: CPT

## 2024-01-19 PROCEDURE — 36415 COLL VENOUS BLD VENIPUNCTURE: CPT

## 2024-01-19 PROCEDURE — 93005 ELECTROCARDIOGRAM TRACING: CPT

## 2024-01-19 PROCEDURE — 80053 COMPREHEN METABOLIC PANEL: CPT

## 2024-01-19 PROCEDURE — 93010 ELECTROCARDIOGRAM REPORT: CPT | Performed by: INTERNAL MEDICINE

## 2024-01-22 ENCOUNTER — TELEPHONE (OUTPATIENT)
Dept: VASCULAR LAB | Facility: MEDICAL CENTER | Age: 69
End: 2024-01-22
Payer: MEDICARE

## 2024-01-22 NOTE — TELEPHONE ENCOUNTER
S/w Eris - pt's last day of warfarin will be on 1/31 and he will have INR check on 2/1.    Verito Quispe, PharmD

## 2024-01-22 NOTE — TELEPHONE ENCOUNTER
Caller: Eris - caregiver     Office Name, phone number, fax number:     [facility]  Phone: [phone]  Fax: [phone]    Procedure Name: Biopsy     Procedure Scheduled Date: 02.06.24    Callback Number: [phone]     Eris said that [facility] has faxed over clearance. He called inquiring about when the patient should hold his medication.     Please advise.    Thank you,     Dahiana MARTIN

## 2024-02-01 ENCOUNTER — ANTICOAGULATION VISIT (OUTPATIENT)
Dept: VASCULAR LAB | Facility: MEDICAL CENTER | Age: 69
End: 2024-02-01
Attending: INTERNAL MEDICINE
Payer: MEDICARE

## 2024-02-01 DIAGNOSIS — Z86.73 H/O: CVA (CEREBROVASCULAR ACCIDENT): ICD-10-CM

## 2024-02-01 DIAGNOSIS — Z79.01 CHRONIC ANTICOAGULATION: ICD-10-CM

## 2024-02-01 LAB — INR PPP: 2.1 (ref 2–3.5)

## 2024-02-01 PROCEDURE — 99212 OFFICE O/P EST SF 10 MIN: CPT | Performed by: NURSE PRACTITIONER

## 2024-02-01 PROCEDURE — 85610 PROTHROMBIN TIME: CPT

## 2024-02-01 NOTE — PROGRESS NOTES
Anticoagulation Summary  As of 2024      INR goal:  2.0-3.0   TTR:  73.7 % (8.6 y)   INR used for dosin.10 (2024)   Warfarin maintenance plan:  1.25 mg (2.5 mg x 0.5) every Sat; 2.5 mg (2.5 mg x 1) all other days   Weekly warfarin total:  16.25 mg   Plan last modified:  Khushboo Wiseman AColinPColinNColin (10/13/2022)   Next INR check:  2024   Target end date:  Indefinite    Indications    Chronic anticoagulation [Z79.01]  Deep vein thrombosis [453.40] (Resolved) [I82.409]  Stroke [434.91] (Resolved) [I63.9]  Deep vein thrombosis (DVT) (HCC) (Resolved) [I82.409]                 Anticoagulation Episode Summary       INR check location:  Anticoagulation Clinic    Preferred lab:      Send INR reminders to:      Comments:            Anticoagulation Care Providers       Provider Role Specialty Phone number    Jamari Platt M.D. Referring Endocrinology [phone]    [facility]   [phone]                  Refer to Patient Findings for HPI:  Patient Findings       Positives:  Signs/symptoms of bleeding, Change in health, Upcoming invasive procedure    Negatives:  Signs/symptoms of thrombosis, Laboratory test error suspected, Change in alcohol use, Change in activity, Emergency department visit, Upcoming dental procedure, Missed doses, Extra doses, Change in medications, Change in diet/appetite, Hospital admission, Bruising, Other complaints    Comments:  Pt was having bright red blood with his stools which were thought to be coming from hemorrhoids. He was evaluated at [facility] for possible surgical removal but was found to have a rectal mass suspicious for cancer. Pt is scheduled for a biopsy on Tuesday, . Needs to be off warfarin 5 days prior. He held his warfarin last night.            There were no vitals filed for this visit.  Pt declined vitals    Verified current warfarin dosing schedule.    Medications reconciled: Yes  Pt is not on antiplatelet therapy.      A/P    INR is therapeutic at 2.1. He held his warfarin last night. Will have him continue to hold until the evening of his procedure, 2/6/24. Given hx of DVT and CVA raf occurred in 2001, discussed with pt and his caregiver/friend, Eris, the option of bridging with enoxaparin to reduce his VTE/CVA risk while off warfarin. He has bridged in the past. Both pt and caregiver decline Lovenox and are aware of the risks.    Warfarin dosing recommendation: HOLD warfarin until the evening of your surgery. Resume warfarin 2/6/24 if okay with your surgeon as outlined on your dosing calendar.      Pt educated to contact our clinic with any changes in medications or s/s of bleeding or thrombosis. Pt is aware to seek immediate medical attention for falls, head injury or deep cuts.    Request pt to return in 1.5 week(s). Pt agrees.    STEVE Nash.    Cc: Dr Brigido Garcia

## 2024-02-06 ENCOUNTER — HOSPITAL ENCOUNTER (OUTPATIENT)
Facility: MEDICAL CENTER | Age: 69
End: 2024-02-06
Attending: SURGERY | Admitting: SURGERY
Payer: MEDICARE

## 2024-02-06 ENCOUNTER — ANESTHESIA EVENT (OUTPATIENT)
Dept: SURGERY | Facility: MEDICAL CENTER | Age: 69
End: 2024-02-06
Payer: MEDICARE

## 2024-02-06 ENCOUNTER — ANESTHESIA (OUTPATIENT)
Dept: SURGERY | Facility: MEDICAL CENTER | Age: 69
End: 2024-02-06
Payer: MEDICARE

## 2024-02-06 VITALS
TEMPERATURE: 96.8 F | DIASTOLIC BLOOD PRESSURE: 75 MMHG | HEIGHT: 72 IN | WEIGHT: 150.13 LBS | OXYGEN SATURATION: 94 % | SYSTOLIC BLOOD PRESSURE: 137 MMHG | RESPIRATION RATE: 16 BRPM | HEART RATE: 77 BPM | BODY MASS INDEX: 20.34 KG/M2

## 2024-02-06 DIAGNOSIS — K62.89 PERIANAL MASS: ICD-10-CM

## 2024-02-06 LAB — PATHOLOGY CONSULT NOTE: NORMAL

## 2024-02-06 PROCEDURE — 160002 HCHG RECOVERY MINUTES (STAT): Performed by: SURGERY

## 2024-02-06 PROCEDURE — 160025 RECOVERY II MINUTES (STATS): Performed by: SURGERY

## 2024-02-06 PROCEDURE — 88342 IMHCHEM/IMCYTCHM 1ST ANTB: CPT

## 2024-02-06 PROCEDURE — 160046 HCHG PACU - 1ST 60 MINS PHASE II: Performed by: SURGERY

## 2024-02-06 PROCEDURE — 700111 HCHG RX REV CODE 636 W/ 250 OVERRIDE (IP): Mod: JZ | Performed by: ANESTHESIOLOGY

## 2024-02-06 PROCEDURE — 160036 HCHG PACU - EA ADDL 30 MINS PHASE I: Performed by: SURGERY

## 2024-02-06 PROCEDURE — 88305 TISSUE EXAM BY PATHOLOGIST: CPT

## 2024-02-06 PROCEDURE — 700105 HCHG RX REV CODE 258: Performed by: SURGERY

## 2024-02-06 PROCEDURE — 160035 HCHG PACU - 1ST 60 MINS PHASE I: Performed by: SURGERY

## 2024-02-06 PROCEDURE — 160028 HCHG SURGERY MINUTES - 1ST 30 MINS LEVEL 3: Performed by: SURGERY

## 2024-02-06 PROCEDURE — 160048 HCHG OR STATISTICAL LEVEL 1-5: Performed by: SURGERY

## 2024-02-06 PROCEDURE — 88332 PATH CONSLTJ SURG EA ADD BLK: CPT

## 2024-02-06 PROCEDURE — 160039 HCHG SURGERY MINUTES - EA ADDL 1 MIN LEVEL 3: Performed by: SURGERY

## 2024-02-06 PROCEDURE — 700101 HCHG RX REV CODE 250: Performed by: SURGERY

## 2024-02-06 PROCEDURE — 700101 HCHG RX REV CODE 250: Performed by: ANESTHESIOLOGY

## 2024-02-06 PROCEDURE — 88331 PATH CONSLTJ SURG 1 BLK 1SPC: CPT

## 2024-02-06 PROCEDURE — 700102 HCHG RX REV CODE 250 W/ 637 OVERRIDE(OP): Performed by: ANESTHESIOLOGY

## 2024-02-06 PROCEDURE — 700111 HCHG RX REV CODE 636 W/ 250 OVERRIDE (IP): Performed by: ANESTHESIOLOGY

## 2024-02-06 PROCEDURE — A9270 NON-COVERED ITEM OR SERVICE: HCPCS | Performed by: ANESTHESIOLOGY

## 2024-02-06 PROCEDURE — 160009 HCHG ANES TIME/MIN: Performed by: SURGERY

## 2024-02-06 RX ORDER — ACETAMINOPHEN 500 MG
1000 TABLET ORAL ONCE
Status: COMPLETED | OUTPATIENT
Start: 2024-02-06 | End: 2024-02-06

## 2024-02-06 RX ORDER — LABETALOL HYDROCHLORIDE 5 MG/ML
5 INJECTION, SOLUTION INTRAVENOUS
Status: DISCONTINUED | OUTPATIENT
Start: 2024-02-06 | End: 2024-02-06 | Stop reason: HOSPADM

## 2024-02-06 RX ORDER — SODIUM CHLORIDE, SODIUM LACTATE, POTASSIUM CHLORIDE, CALCIUM CHLORIDE 600; 310; 30; 20 MG/100ML; MG/100ML; MG/100ML; MG/100ML
INJECTION, SOLUTION INTRAVENOUS CONTINUOUS
Status: ACTIVE | OUTPATIENT
Start: 2024-02-06 | End: 2024-02-06

## 2024-02-06 RX ORDER — HYDROMORPHONE HYDROCHLORIDE 1 MG/ML
0.4 INJECTION, SOLUTION INTRAMUSCULAR; INTRAVENOUS; SUBCUTANEOUS
Status: DISCONTINUED | OUTPATIENT
Start: 2024-02-06 | End: 2024-02-06 | Stop reason: HOSPADM

## 2024-02-06 RX ORDER — PHENYLEPHRINE HYDROCHLORIDE 10 MG/ML
INJECTION, SOLUTION INTRAMUSCULAR; INTRAVENOUS; SUBCUTANEOUS PRN
Status: DISCONTINUED | OUTPATIENT
Start: 2024-02-06 | End: 2024-02-06 | Stop reason: SURG

## 2024-02-06 RX ORDER — ONDANSETRON 2 MG/ML
4 INJECTION INTRAMUSCULAR; INTRAVENOUS
Status: DISCONTINUED | OUTPATIENT
Start: 2024-02-06 | End: 2024-02-06 | Stop reason: HOSPADM

## 2024-02-06 RX ORDER — ONDANSETRON 2 MG/ML
INJECTION INTRAMUSCULAR; INTRAVENOUS PRN
Status: DISCONTINUED | OUTPATIENT
Start: 2024-02-06 | End: 2024-02-06 | Stop reason: SURG

## 2024-02-06 RX ORDER — OXYCODONE HCL 5 MG/5 ML
5 SOLUTION, ORAL ORAL
Status: DISCONTINUED | OUTPATIENT
Start: 2024-02-06 | End: 2024-02-06 | Stop reason: HOSPADM

## 2024-02-06 RX ORDER — HALOPERIDOL 5 MG/ML
1 INJECTION INTRAMUSCULAR
Status: DISCONTINUED | OUTPATIENT
Start: 2024-02-06 | End: 2024-02-06 | Stop reason: HOSPADM

## 2024-02-06 RX ORDER — HYDROMORPHONE HYDROCHLORIDE 1 MG/ML
0.2 INJECTION, SOLUTION INTRAMUSCULAR; INTRAVENOUS; SUBCUTANEOUS
Status: DISCONTINUED | OUTPATIENT
Start: 2024-02-06 | End: 2024-02-06 | Stop reason: HOSPADM

## 2024-02-06 RX ORDER — EPHEDRINE SULFATE 50 MG/ML
INJECTION, SOLUTION INTRAVENOUS PRN
Status: DISCONTINUED | OUTPATIENT
Start: 2024-02-06 | End: 2024-02-06 | Stop reason: SURG

## 2024-02-06 RX ORDER — ROCURONIUM BROMIDE 10 MG/ML
INJECTION, SOLUTION INTRAVENOUS PRN
Status: DISCONTINUED | OUTPATIENT
Start: 2024-02-06 | End: 2024-02-06 | Stop reason: SURG

## 2024-02-06 RX ORDER — CELECOXIB 200 MG/1
200 CAPSULE ORAL ONCE
Status: COMPLETED | OUTPATIENT
Start: 2024-02-06 | End: 2024-02-06

## 2024-02-06 RX ORDER — SODIUM CHLORIDE, SODIUM LACTATE, POTASSIUM CHLORIDE, CALCIUM CHLORIDE 600; 310; 30; 20 MG/100ML; MG/100ML; MG/100ML; MG/100ML
INJECTION, SOLUTION INTRAVENOUS CONTINUOUS
Status: DISCONTINUED | OUTPATIENT
Start: 2024-02-06 | End: 2024-02-06 | Stop reason: HOSPADM

## 2024-02-06 RX ORDER — LIDOCAINE HYDROCHLORIDE 20 MG/ML
INJECTION, SOLUTION EPIDURAL; INFILTRATION; INTRACAUDAL; PERINEURAL PRN
Status: DISCONTINUED | OUTPATIENT
Start: 2024-02-06 | End: 2024-02-06 | Stop reason: SURG

## 2024-02-06 RX ORDER — CEFOTETAN DISODIUM 2 G/20ML
INJECTION, POWDER, FOR SOLUTION INTRAMUSCULAR; INTRAVENOUS PRN
Status: DISCONTINUED | OUTPATIENT
Start: 2024-02-06 | End: 2024-02-06 | Stop reason: SURG

## 2024-02-06 RX ORDER — HYDRALAZINE HYDROCHLORIDE 20 MG/ML
5 INJECTION INTRAMUSCULAR; INTRAVENOUS
Status: DISCONTINUED | OUTPATIENT
Start: 2024-02-06 | End: 2024-02-06 | Stop reason: HOSPADM

## 2024-02-06 RX ORDER — OXYCODONE HYDROCHLORIDE AND ACETAMINOPHEN 5; 325 MG/1; MG/1
1 TABLET ORAL EVERY 6 HOURS PRN
Qty: 20 TABLET | Refills: 0 | Status: SHIPPED | OUTPATIENT
Start: 2024-02-06 | End: 2024-02-11

## 2024-02-06 RX ORDER — OXYCODONE HCL 5 MG/5 ML
10 SOLUTION, ORAL ORAL
Status: DISCONTINUED | OUTPATIENT
Start: 2024-02-06 | End: 2024-02-06 | Stop reason: HOSPADM

## 2024-02-06 RX ORDER — BUPIVACAINE HYDROCHLORIDE AND EPINEPHRINE 5; 5 MG/ML; UG/ML
INJECTION, SOLUTION PERINEURAL
Status: DISCONTINUED | OUTPATIENT
Start: 2024-02-06 | End: 2024-02-06 | Stop reason: HOSPADM

## 2024-02-06 RX ORDER — HYDROMORPHONE HYDROCHLORIDE 1 MG/ML
0.1 INJECTION, SOLUTION INTRAMUSCULAR; INTRAVENOUS; SUBCUTANEOUS
Status: DISCONTINUED | OUTPATIENT
Start: 2024-02-06 | End: 2024-02-06 | Stop reason: HOSPADM

## 2024-02-06 RX ADMIN — PHENYLEPHRINE HYDROCHLORIDE 150 MCG: 10 INJECTION INTRAVENOUS at 15:35

## 2024-02-06 RX ADMIN — SUGAMMADEX 200 MG: 100 INJECTION, SOLUTION INTRAVENOUS at 15:38

## 2024-02-06 RX ADMIN — PROPOFOL 50 MG: 10 INJECTION, EMULSION INTRAVENOUS at 15:38

## 2024-02-06 RX ADMIN — PROPOFOL 140 MG: 10 INJECTION, EMULSION INTRAVENOUS at 14:55

## 2024-02-06 RX ADMIN — ROCURONIUM BROMIDE 30 MG: 50 INJECTION, SOLUTION INTRAVENOUS at 14:55

## 2024-02-06 RX ADMIN — EPHEDRINE SULFATE 15 MG: 50 INJECTION, SOLUTION INTRAVENOUS at 15:00

## 2024-02-06 RX ADMIN — LIDOCAINE HYDROCHLORIDE 50 MG: 20 INJECTION, SOLUTION EPIDURAL; INFILTRATION; INTRACAUDAL at 14:55

## 2024-02-06 RX ADMIN — CELECOXIB 200 MG: 200 CAPSULE ORAL at 14:25

## 2024-02-06 RX ADMIN — FENTANYL CITRATE 100 MCG: 50 INJECTION, SOLUTION INTRAMUSCULAR; INTRAVENOUS at 14:55

## 2024-02-06 RX ADMIN — PHENYLEPHRINE HYDROCHLORIDE 100 MCG: 10 INJECTION INTRAVENOUS at 15:23

## 2024-02-06 RX ADMIN — PHENYLEPHRINE HYDROCHLORIDE 100 MCG: 10 INJECTION INTRAVENOUS at 15:12

## 2024-02-06 RX ADMIN — SODIUM CHLORIDE, POTASSIUM CHLORIDE, SODIUM LACTATE AND CALCIUM CHLORIDE: 600; 310; 30; 20 INJECTION, SOLUTION INTRAVENOUS at 14:50

## 2024-02-06 RX ADMIN — ONDANSETRON 4 MG: 2 INJECTION INTRAMUSCULAR; INTRAVENOUS at 15:38

## 2024-02-06 RX ADMIN — CEFOTETAN DISODIUM 2 G: 2 INJECTION, POWDER, FOR SOLUTION INTRAMUSCULAR; INTRAVENOUS at 14:55

## 2024-02-06 RX ADMIN — ACETAMINOPHEN 1000 MG: 500 TABLET ORAL at 14:24

## 2024-02-06 ASSESSMENT — FIBROSIS 4 INDEX: FIB4 SCORE: 1.63

## 2024-02-06 ASSESSMENT — PAIN DESCRIPTION - PAIN TYPE: TYPE: SURGICAL PAIN

## 2024-02-06 ASSESSMENT — PAIN SCALES - GENERAL: PAIN_LEVEL: 0

## 2024-02-06 NOTE — PROGRESS NOTES
Med Rec complete per PT  Allergies Reviewed    Pt reports taking anticoagulant in the last 14 days  Anticoagulant: WARFARIN, Last dose: 2/02
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
## 2024-02-06 NOTE — ANESTHESIA PREPROCEDURE EVALUATION
Case: [id number] Date/Time: 02/06/24 1511    Procedure: INCISIONAL VERUS EXCISIONAL BIOPSY OF A RECTAL MASS    Pre-op diagnosis: RECTAL MASS    Location: [facility] OR 11 / SURGERY [facility]    Surgeons: Brigido Garcia MD, PhD            Relevant Problems   Other   (positive) Chronic anticoagulation   (positive) H/O: CVA (cerebrovascular accident)   (positive) Hemiplegia of dominant side as late effect following cerebrovascular disease (HCC)   (positive) S/P BKA (below knee amputation) unilateral (HCC)       Physical Exam    Airway   Mallampati: II  TM distance: >3 FB  Neck ROM: full       Cardiovascular - normal exam  Rhythm: regular  Rate: normal  (-) murmur     Dental   Comments: No teeth         Pulmonary - normal exam  Breath sounds clear to auscultation     Abdominal    Neurological - normal exam                   Anesthesia Plan    ASA 3   ASA physical status 3 criteria: CVA or TIA - history (> 3 months)    Plan - general       Airway plan will be ETT          Induction: intravenous    Postoperative Plan: Postoperative administration of opioids is intended.    Pertinent diagnostic labs and testing reviewed    Informed Consent:    Anesthetic plan and risks discussed with patient.

## 2024-02-07 NOTE — OR NURSING
1711 - Pt's vital signs are stable, pt is alert and reporting no pain at this time. Dressing is clean, dry and intact - fluff and brief. Pt has no complaints of nausea or vomiting.    1733 - Discharge instructions including prescriptions and follow up appointments were discussed with pt and caregiver. Pt's IV was discontinued and pt was given all belongings. Pt had no other questions. Pt pushed out via his personal wheelchair.

## 2024-02-07 NOTE — OR NURSING
1549  Patient arrived from OR, oral airway in place. Received report from Dr. Escobar and Francisco Javier REINOSO RN, assumed care. VSS. Perianal dressing CDI, mesh briefs in place. Patient appears comfortable, no s/s of pain or nausea noted. Orders reviewed and implemented.  1555 Rouses to voice, oral airway removed. Denies pain or nausea. 1600 Oriented to baseline. Declines water or ice chips. 1620 Caregiver updated. 1630 Weaning off oxygen.  1650 Patient resting comfortably, continues to deny pain or nausea. No symptoms of either noted. VSS, maintaining O2 on RA. Dressing remains CDI. Meeting criteria for Phase II recovery. 1658 Report to Satish NERI RN.  1707 Discharged to Phase II. Upon arrival Satish NERI RN bedside to assume care.

## 2024-02-07 NOTE — ANESTHESIA TIME REPORT
Anesthesia Start and Stop Event Times       Date Time Event    2/6/2024 1442 Ready for Procedure     1450 Anesthesia Start     1557 Anesthesia Stop          Responsible Staff  02/06/24      Name Role Begin End    Ismael Paulson M.D. Anesth 1450 1518    Nabil Escobar M.D. Anesth 1518 1557          Overtime Reason:  overtime    Comments:

## 2024-02-07 NOTE — ANESTHESIA POSTPROCEDURE EVALUATION
Patient: Drew Melton    Procedure Summary       Date: 02/06/24 Room / Location: [facility] 11 / SURGERY [facility]    Anesthesia Start: 1450 Anesthesia Stop:     Procedure: INCISIONAL BIOPSY OF PERIANAL MASS (Buttocks) Diagnosis: (PERIANAL MASS)    Surgeons: Brigido Garcia MD, PhD Responsible Provider: Nabil Escobar M.D.    Anesthesia Type: general ASA Status: 3            Final Anesthesia Type: general  Last vitals  BP   Blood Pressure : 137/75    Temp   36 °C (96.8 °F)    Pulse   77   Resp   16    SpO2   94 %      Anesthesia Post Evaluation    Patient location during evaluation: PACU  Patient participation: complete - patient participated  Level of consciousness: sleepy but conscious  Pain score: 0    Airway patency: patent  Anesthetic complications: no  Cardiovascular status: hemodynamically stable  Respiratory status: acceptable  Hydration status: balanced    PONV: none          No notable events documented.     Nurse Pain Score: 0 (NPRS)

## 2024-02-07 NOTE — DISCHARGE INSTRUCTIONS
HOME CARE INSTRUCTIONS    ACTIVITY: Rest and take it easy for the first 24 hours.  A responsible adult is recommended to remain with you during that time.  It is normal to feel sleepy.  We encourage you to not do anything that requires balance, judgment or coordination.    FOR 24 HOURS DO NOT:  Drive, operate machinery or run household appliances.  Drink beer or alcoholic beverages.  Make important decisions or sign legal documents.    SPECIAL INSTRUCTIONS: Keep area clean, make sure that your bowels are moving - may need to add stool softener to the regimen to keep stool soft. Increase water and fiber intake.     DIET: To avoid nausea, slowly advance diet as tolerated, avoiding spicy or greasy foods for the first day.  Add more substantial food to your diet according to your physician's instructions.  Babies can be fed formula or breast milk as soon as they are hungry.  INCREASE FLUIDS AND FIBER TO AVOID CONSTIPATION.    SURGICAL DRESSING/BATHING: You may shower, but be gentle with anus.    MEDICATIONS: Resume taking daily medication.  Take prescribed pain medication with food.  If no medication is prescribed, you may take non-aspirin pain medication if needed.  PAIN MEDICATION CAN BE VERY CONSTIPATING.  Take a stool softener or laxative such as senokot, pericolace, or milk of magnesia if needed.    Prescription given for percocet.      A follow-up appointment should be arranged with your doctor in 2 weeks; call to schedule.    You should CALL YOUR PHYSICIAN if you develop:  Fever greater than 101 degrees F.  Pain not relieved by medication, or persistent nausea or vomiting.  Excessive bleeding (blood soaking through dressing) or unexpected drainage from the wound.  Extreme redness or swelling around the incision site, drainage of pus or foul smelling drainage.  Inability to urinate or empty your bladder within 8 hours.  Problems with breathing or chest pain.    You should call 911 if you develop problems with  breathing or chest pain.  If you are unable to contact your doctor or surgical center, you should go to the nearest emergency room or urgent care center.  Physician's telephone #: [phone]    MILD FLU-LIKE SYMPTOMS ARE NORMAL.  YOU MAY EXPERIENCE GENERALIZED MUSCLE ACHES, THROAT IRRITATION, HEADACHE AND/OR SOME NAUSEA.    If any questions arise, call your doctor.  If your doctor is not available, please feel free to call the Surgical Center at [phone].  The Center is open Monday through Friday from 7AM to 7PM.      A registered nurse may call you a few days after your surgery to see how you are doing after your procedure.    You may also receive a survey in the mail within the next two weeks and we ask that you take a few moments to complete the survey and return it to us.  Our goal is to provide you with very good care and we value your comments.     Depression / Suicide Risk    As you are discharged from this [facility] facility, it is important to learn how to keep safe from harming yourself.    Recognize the warning signs:  Abrupt changes in personality, positive or negative- including increase in energy   Giving away possessions  Change in eating patterns- significant weight changes-  positive or negative  Change in sleeping patterns- unable to sleep or sleeping all the time   Unwillingness or inability to communicate  Depression  Unusual sadness, discouragement and loneliness  Talk of wanting to die  Neglect of personal appearance   Rebelliousness- reckless behavior  Withdrawal from people/activities they love  Confusion- inability to concentrate     If you or a loved one observes any of these behaviors or has concerns about self-harm, here's what you can do:  Talk about it- your feelings and reasons for harming yourself  Remove any means that you might use to hurt yourself (examples: pills, rope, extension cords, firearm)  Get professional help from the community (Mental Health, Substance Abuse,  psychological counseling)  Do not be alone:Call your Safe Contact- someone whom you trust who will be there for you.  Call your local CRISIS HOTLINE 449-2315 or 205-137-0248  Call your local Children's Mobile Crisis Response Team [place] (519) 883-4756 or www.[employer]  Call the toll free National Suicide Prevention Hotlines   National Suicide Prevention Lifeline 792-129-TVFW (3377)  [employer] Line Network 800-SUICIDE (387-1527)    I acknowledge receipt and understanding of these Home Care instructions.

## 2024-02-12 ENCOUNTER — ANTICOAGULATION VISIT (OUTPATIENT)
Dept: VASCULAR LAB | Facility: MEDICAL CENTER | Age: 69
End: 2024-02-12
Attending: INTERNAL MEDICINE
Payer: MEDICARE

## 2024-02-12 DIAGNOSIS — Z79.01 CHRONIC ANTICOAGULATION: ICD-10-CM

## 2024-02-12 LAB — INR PPP: 1.9 (ref 2–3.5)

## 2024-02-12 PROCEDURE — 85610 PROTHROMBIN TIME: CPT

## 2024-02-12 PROCEDURE — 99211 OFF/OP EST MAY X REQ PHY/QHP: CPT

## 2024-02-12 NOTE — PROGRESS NOTES
Anticoagulation Summary  As of 2024      INR goal:  2.0-3.0   TTR:  73.7 % (8.7 y)   INR used for dosin.90 (2024)   Warfarin maintenance plan:  1.25 mg (2.5 mg x 0.5) every Sat; 2.5 mg (2.5 mg x 1) all other days   Weekly warfarin total:  16.25 mg   Plan last modified:  NALINI NashPColinNColin (10/13/2022)   Next INR check:  3/14/2024   Target end date:  Indefinite    Indications    Chronic anticoagulation [Z79.01]  Deep vein thrombosis [453.40] (Resolved) [I82.409]  Stroke [434.91] (Resolved) [I63.9]  Deep vein thrombosis (DVT) (HCC) (Resolved) [I82.409]                 Anticoagulation Episode Summary       INR check location:  Anticoagulation Clinic    Preferred lab:      Send INR reminders to:      Comments:            Anticoagulation Care Providers       Provider Role Specialty Phone number    Jamari Platt M.D. Referring Endocrinology [phone]    [facility]   [phone]          Refer to Patient Findings for HPI:  Patient Findings       Negatives:  Signs/symptoms of thrombosis, Signs/symptoms of bleeding, Laboratory test error suspected, Change in health, Change in alcohol use, Change in activity, Upcoming invasive procedure, Emergency department visit, Upcoming dental procedure, Missed doses, Extra doses, Change in medications, Change in diet/appetite, Hospital admission, Bruising, Other complaints          There were no vitals filed for this visit.  The pt declined vitals today     Patient seen in clinic today for follow up on anticoagulation therapy with warfarin (a high risk medication) for hx of DVT, Stroke  Verified current warfarin dosing schedule.  Patient denies any missed doses of warfarin.    Medications reconciled   Pt is not on antiplatelet therapy      A/P   INR is slightly SUB-therapeutic today at 1.9.     Warfarin dosing recommendation: Patient will continue with his current dosing regimen. Patient asked to return in 2 weeks, but caregiver prefers to  bring him back in 4 weeks instead and understands risks.     Pt educated to contact our clinic with any changes in medications or s/s of bleeding or thrombosis. Pt is aware to seek immediate medical attention for falls, head injury or deep cuts.    Follow up appointment in 4 week(s).    Next appt: Thurs, March 14 @ 9am     Jaswant Calzada PharmD

## 2024-02-12 NOTE — OP REPORT
DATE OF OPERATION: 2/6/2024    SURGEON:  Brigido Garcia MD PhD    PREOPERATIVE DIAGNOSIS:    Rectal bleeding   rectal pain   Perianal mass  POSTOPERATIVE DIAGNOSIS:   same  SURGERY: exam under anesthesia with excisional biopsy of perianal mass  OPERATIVE FINDINGS:  6 cm raised mucous appearing lesion, not involving the ANAL canal  INDICATIONS FOR PROCEDURE:  This 68 y.o. male with a history of perianal mass.  He possible risks and complications of treatment were described in detail to the patient including infection, bleeding, trouble urinating and very rarely narrowing in the anal canal.  The patient expresses understanding of the risks and benefits of the proposed procedure and wished to proceed.    PROCEDURE IN BRIEF:    The patient was brought to the operating room placed under general anesthesia with bilateral SCDs in place. The patient was then moved into the prone jackknife position.  a timeout was completed verifying the correct patient and procedure site positioning and availability of equipment prior to the start of surgery.  The buttocks were taped apart and the perineum was prepped and draped in the usual standard sterile fashion.  Half percent Marcaine with epinephrine was injected as a perianal nerve block. The anus was carefully dilated until 3 fingers could be introduced.  An anoscope was introduced and the 3 hemorrhoidal pedicles were identified. The mass was excised sharply and sent for frozen pathology.  When the anal verge was encountered.  If this was squamous cell carcinoma.  I did not believe I could perform an oncologic resection without injuring the external sphincter.  Pathology did not demonstrate any evidence of malignancy.  I then excised the remaining mass.  Hemostasis was obtained.  The skin and mucosal incisions were closed with a running lock suture of 2-0 Vicryl on the mucosal aspect and converted to subcuticular once the skin was encountered. The anal canal was then injected with  local anesthetic.  A gauze pad was tucked between the gluteal folds. The patient tolerated the procedure well and was extubated and taken to the postanesthesia care unit in stable condition.       ____________________________________     LUDA LY MD

## 2024-02-21 DIAGNOSIS — C21.1 CANCER OF ANAL CANAL (HCC): ICD-10-CM

## 2024-02-21 DIAGNOSIS — C21.0 ANAL CANCER (HCC): ICD-10-CM

## 2024-02-23 ENCOUNTER — HOSPITAL ENCOUNTER (OUTPATIENT)
Dept: RADIOLOGY | Facility: MEDICAL CENTER | Age: 69
End: 2024-02-23
Attending: RADIOLOGY
Payer: MEDICARE

## 2024-02-23 DIAGNOSIS — C21.0 ANAL CANCER (HCC): ICD-10-CM

## 2024-02-23 DIAGNOSIS — C21.1 CANCER OF ANAL CANAL (HCC): ICD-10-CM

## 2024-02-23 PROCEDURE — A9552 F18 FDG: HCPCS

## 2024-03-01 ENCOUNTER — HOSPITAL ENCOUNTER (OUTPATIENT)
Dept: RADIOLOGY | Facility: MEDICAL CENTER | Age: 69
End: 2024-03-01
Attending: RADIOLOGY
Payer: MEDICARE

## 2024-03-01 DIAGNOSIS — C21.0 ANAL CANCER (HCC): ICD-10-CM

## 2024-03-01 PROCEDURE — A9579 GAD-BASE MR CONTRAST NOS,1ML: HCPCS | Performed by: RADIOLOGY

## 2024-03-01 PROCEDURE — 72197 MRI PELVIS W/O & W/DYE: CPT

## 2024-03-01 PROCEDURE — 700117 HCHG RX CONTRAST REV CODE 255: Performed by: RADIOLOGY

## 2024-03-01 PROCEDURE — 700111 HCHG RX REV CODE 636 W/ 250 OVERRIDE (IP): Mod: JZ,JG | Performed by: RADIOLOGY

## 2024-03-01 RX ADMIN — GLUCAGON 1 MG: 1 INJECTION, POWDER, LYOPHILIZED, FOR SOLUTION INTRAMUSCULAR; INTRAVENOUS at 16:36

## 2024-03-01 RX ADMIN — GADOTERIDOL 15 ML: 279.3 INJECTION, SOLUTION INTRAVENOUS at 17:15

## 2024-03-11 DIAGNOSIS — C21.0 ANAL CANCER (HCC): ICD-10-CM

## 2024-03-11 NOTE — CT SIMULATION
PATIENT NAME Drew Melton   PRIMARY PHYSICIAN Becky Negrete [id number]   REFERRING PHYSICIAN No ref. provider found 1955     Anal cancer (HCC)  Staging form: Anus, AJCC V9  - Clinical stage from 3/11/2024: Stage IIIA (cT3, cN0, cM0) - Signed by Juan Lane M.D. on 3/11/2024  Histologic grade (G): G1  Histologic grading system: 4 grade system         Treatment Planning CT Simulation        Order Questions       Question Answer Comment    Is this for a new course of treatment? Yes     Is this an Addendum? No     Simulation Status Initial     Planned Start Date 3/25/2024     Treatment Site Anus     Laterality Axial     Treatment Technique IMRT     Treatment Pattern/Frequency Daily     Concurrent Chemotherapy Yes 5fu/shannan    Ordering Provider DMITRY MALONEY     CT Technique 3D     Scan Extent Pelvis     Treatment Device(s) Vac Avila     Treatment Marker  anal bb    Patient Attire Gown     Patient Position Supine     Chin Position Neutral     Bladder No preference     Treatment Machine No preference     Treatment Image Guidance CBCT     Frequency (CBCT) Daily     Image Guidance Match Bone     Treatment Planning Image Fusion CT/PET/MR     Other Orders Special Tx Procedure      Weekly Physics Check

## 2024-03-14 ENCOUNTER — ANTICOAGULATION VISIT (OUTPATIENT)
Dept: VASCULAR LAB | Facility: MEDICAL CENTER | Age: 69
End: 2024-03-14
Attending: INTERNAL MEDICINE
Payer: MEDICARE

## 2024-03-14 DIAGNOSIS — Z79.01 CHRONIC ANTICOAGULATION: ICD-10-CM

## 2024-03-14 LAB — INR PPP: 2.4 (ref 2–3.5)

## 2024-03-14 PROCEDURE — 99211 OFF/OP EST MAY X REQ PHY/QHP: CPT

## 2024-03-14 PROCEDURE — 85610 PROTHROMBIN TIME: CPT

## 2024-03-14 NOTE — PROGRESS NOTES
Anticoagulation Summary  As of 3/14/2024      INR goal:  2.0-3.0   TTR:  73.7% (8.7 y)   INR used for dosin.40 (3/14/2024)   Warfarin maintenance plan:  1.25 mg (2.5 mg x 0.5) every Sat; 2.5 mg (2.5 mg x 1) all other days   Weekly warfarin total:  16.25 mg   Plan last modified:  Khushboo Wiseman AColinP.NColin (10/13/2022)   Next INR check:  2024   Target end date:  Indefinite    Indications    Chronic anticoagulation [Z79.01]  Deep vein thrombosis [453.40] (Resolved) [I82.409]  Stroke [434.91] (Resolved) [I63.9]  Deep vein thrombosis (DVT) (HCC) (Resolved) [I82.409]                 Anticoagulation Episode Summary       INR check location:  Anticoagulation Clinic    Preferred lab:      Send INR reminders to:      Comments:            Anticoagulation Care Providers       Provider Role Specialty Phone number    Jamari Platt M.D. Referring Endocrinology [phone]    [facility]   [phone]          Refer to Patient Findings for HPI:  Patient Findings       Negatives:  Signs/symptoms of thrombosis, Signs/symptoms of bleeding, Laboratory test error suspected, Change in health, Change in alcohol use, Change in activity, Upcoming invasive procedure, Emergency department visit, Upcoming dental procedure, Missed doses, Extra doses, Change in medications, Change in diet/appetite, Hospital admission, Bruising, Other complaints            There were no vitals filed for this visit.  Pt declined vitals    Verified current warfarin dosing schedule.    Medications reconciled: Yes  Pt is not on antiplatelet therapy      A/P   INR is therapeutic    Warfarin dosing recommendation: Continue regimen as listed above.    Pt educated to contact our clinic with any changes in medications or s/s of bleeding or thrombosis. Pt is aware to seek immediate medical attention for falls, head injury or deep cuts.    Follow up appointment in 6 week(s) per caregiver request    Macy Sherwood, LyndseyD

## 2024-03-15 ENCOUNTER — HOSPITAL ENCOUNTER (OUTPATIENT)
Facility: MEDICAL CENTER | Age: 69
End: 2024-03-15
Attending: INTERNAL MEDICINE
Payer: MEDICARE

## 2024-03-15 ENCOUNTER — PATIENT OUTREACH (OUTPATIENT)
Dept: ONCOLOGY | Facility: MEDICAL CENTER | Age: 69
End: 2024-03-15
Payer: MEDICARE

## 2024-03-15 LAB
ALBUMIN SERPL BCP-MCNC: 4.4 G/DL (ref 3.2–4.9)
ALBUMIN/GLOB SERPL: 1.4 G/DL
ALP SERPL-CCNC: 83 U/L (ref 30–99)
ALT SERPL-CCNC: 24 U/L (ref 2–50)
ANION GAP SERPL CALC-SCNC: 14 MMOL/L (ref 7–16)
AST SERPL-CCNC: 25 U/L (ref 12–45)
BILIRUB SERPL-MCNC: 1.3 MG/DL (ref 0.1–1.5)
BUN SERPL-MCNC: 22 MG/DL (ref 8–22)
CALCIUM ALBUM COR SERPL-MCNC: 9.3 MG/DL (ref 8.5–10.5)
CALCIUM SERPL-MCNC: 9.6 MG/DL (ref 8.5–10.5)
CHLORIDE SERPL-SCNC: 104 MMOL/L (ref 96–112)
CO2 SERPL-SCNC: 25 MMOL/L (ref 20–33)
CREAT SERPL-MCNC: 0.6 MG/DL (ref 0.5–1.4)
GFR SERPLBLD CREATININE-BSD FMLA CKD-EPI: 106 ML/MIN/1.73 M 2
GLOBULIN SER CALC-MCNC: 3.1 G/DL (ref 1.9–3.5)
GLUCOSE SERPL-MCNC: 84 MG/DL (ref 65–99)
HAV IGM SERPL QL IA: NORMAL
HBV CORE IGM SER QL: NORMAL
HBV SURFACE AG SER QL: NORMAL
HCV AB SER QL: NORMAL
HIV 1+2 AB+HIV1 P24 AG SERPL QL IA: NORMAL
INR PPP: 2.14 (ref 0.87–1.13)
POTASSIUM SERPL-SCNC: 4 MMOL/L (ref 3.6–5.5)
PROT SERPL-MCNC: 7.5 G/DL (ref 6–8.2)
PROTHROMBIN TIME: 24.2 SEC (ref 12–14.6)
SODIUM SERPL-SCNC: 143 MMOL/L (ref 135–145)

## 2024-03-15 PROCEDURE — G0475 HIV COMBINATION ASSAY: HCPCS | Mod: GA

## 2024-03-15 PROCEDURE — 85610 PROTHROMBIN TIME: CPT | Mod: GA

## 2024-03-15 PROCEDURE — 80053 COMPREHEN METABOLIC PANEL: CPT

## 2024-03-15 PROCEDURE — 80074 ACUTE HEPATITIS PANEL: CPT

## 2024-03-15 NOTE — PROGRESS NOTES
ONN reached out to patient to introduce self and role and to confirm patient's appointment with radiation therapy on 3/18/24.  Patient stated that they needed to cancel that appointment as they would be receiving all their treatment at [facility] and did not need any services from [facility] at this time.  YOSI will reach out to  to let them know and encouraged patient to call our services if they needed any further services from [facility].  Patient thanked ONLOURDES for the call.

## 2024-03-18 ENCOUNTER — APPOINTMENT (OUTPATIENT)
Dept: RADIATION ONCOLOGY | Facility: MEDICAL CENTER | Age: 69
End: 2024-03-18
Attending: RADIOLOGY
Payer: MEDICARE

## 2024-03-18 LAB — EXTRA TUBE BLU BLU: NORMAL

## 2024-03-23 LAB — TEST NAME 95000: NORMAL

## 2024-03-25 ENCOUNTER — TELEPHONE (OUTPATIENT)
Dept: VASCULAR LAB | Facility: MEDICAL CENTER | Age: 69
End: 2024-03-25
Payer: MEDICARE

## 2024-03-25 NOTE — TELEPHONE ENCOUNTER
Caller: Beth Sahu with [facility]      Topic/issue: Their office was calling because they would be starting him on a newd medication called Xeloda (1500 mg twice a day for 6 weeks) and they were calling to notify because it could affect his coumadin         Callback Number: [phone]       Thank you    -Manuelito ELMORE

## 2024-03-25 NOTE — TELEPHONE ENCOUNTER
Patient scheduled for sooner appointment (3 days after he starts Xeloda). 4/3/24.      Jacqui Peterson, LyndseyD

## 2024-04-03 ENCOUNTER — ANTICOAGULATION VISIT (OUTPATIENT)
Dept: VASCULAR LAB | Facility: MEDICAL CENTER | Age: 69
End: 2024-04-03
Attending: INTERNAL MEDICINE
Payer: MEDICARE

## 2024-04-03 DIAGNOSIS — Z79.01 CHRONIC ANTICOAGULATION: ICD-10-CM

## 2024-04-03 LAB — INR PPP: 2.3 (ref 2–3.5)

## 2024-04-03 PROCEDURE — 85610 PROTHROMBIN TIME: CPT

## 2024-04-03 PROCEDURE — 99211 OFF/OP EST MAY X REQ PHY/QHP: CPT

## 2024-04-03 RX ORDER — CAPECITABINE 500 MG/1
1000 TABLET, FILM COATED ORAL 2 TIMES DAILY
Status: ON HOLD | COMMUNITY

## 2024-04-03 NOTE — PROGRESS NOTES
Anticoagulation Summary  As of 4/3/2024      INR goal:  2.0-3.0   TTR:  73.9% (8.8 y)   INR used for dosin.30 (4/3/2024)   Warfarin maintenance plan:  1.25 mg (2.5 mg x 0.5) every Tue, Sat; 2.5 mg (2.5 mg x 1) all other days   Weekly warfarin total:  15 mg   Plan last modified:  Musa Deluca PharmD (4/3/2024)   Next INR check:  4/10/2024   Target end date:  Indefinite    Indications    Chronic anticoagulation [Z79.01]  Deep vein thrombosis [453.40] (Resolved) [I82.409]  Stroke [434.91] (Resolved) [I63.9]  Deep vein thrombosis (DVT) (HCC) (Resolved) [I82.409]                 Anticoagulation Episode Summary       INR check location:  Anticoagulation Clinic    Preferred lab:      Send INR reminders to:      Comments:            Anticoagulation Care Providers       Provider Role Specialty Phone number    Jamari Platt M.D. Referring Endocrinology [phone]    [facility]   [phone]                  Refer to Patient Findings for HPI:  Patient Findings       Positives:  Change in medications (Started Xeloda x 3 days ago)    Negatives:  Signs/symptoms of thrombosis, Signs/symptoms of bleeding, Laboratory test error suspected, Change in health, Change in alcohol use, Change in activity, Upcoming invasive procedure, Emergency department visit, Upcoming dental procedure, Missed doses, Extra doses, Change in diet/appetite, Hospital admission, Bruising, Other complaints            There were no vitals filed for this visit.    Verified current warfarin dosing schedule.    Medications reconciled: Yes  Pt is not on antiplatelet therapy      A/P   INR is therapeutic    Warfarin dosing recommendation: Instructed pt to begin newly decreased regimen of 1.25 mg Tues/Sat and 2.5 mg ROW given DDI w/ Xeloda.    Pt educated to contact our clinic with any changes in medications or s/s of bleeding or thrombosis. Pt is aware to seek immediate medical attention for falls, head injury or deep  cuts.    Follow up appointment in 1 week(s).    Musa Deluca, PharmD, BCACP

## 2024-04-10 ENCOUNTER — ANTICOAGULATION VISIT (OUTPATIENT)
Dept: VASCULAR LAB | Facility: MEDICAL CENTER | Age: 69
End: 2024-04-10
Attending: INTERNAL MEDICINE
Payer: MEDICARE

## 2024-04-10 ENCOUNTER — TELEPHONE (OUTPATIENT)
Dept: VASCULAR LAB | Facility: MEDICAL CENTER | Age: 69
End: 2024-04-10

## 2024-04-10 DIAGNOSIS — Z79.01 CHRONIC ANTICOAGULATION: ICD-10-CM

## 2024-04-10 LAB — INR PPP: 1.7 (ref 2–3.5)

## 2024-04-10 PROCEDURE — 85610 PROTHROMBIN TIME: CPT

## 2024-04-10 PROCEDURE — 99212 OFFICE O/P EST SF 10 MIN: CPT

## 2024-04-10 NOTE — PROGRESS NOTES
Anticoagulation Summary  As of 4/10/2024      INR goal:  2.0-3.0   TTR:  73.8% (8.8 y)   INR used for dosin.70 (4/10/2024)   Warfarin maintenance plan:  1.25 mg (2.5 mg x 0.5) every Tue, Sat; 2.5 mg (2.5 mg x 1) all other days   Weekly warfarin total:  15 mg   Plan last modified:  Musa Deluca PharmD (4/3/2024)   Next INR check:  2024   Target end date:  Indefinite    Indications    Chronic anticoagulation [Z79.01]  Deep vein thrombosis [453.40] (Resolved) [I82.409]  Stroke [434.91] (Resolved) [I63.9]  Deep vein thrombosis (DVT) (HCC) (Resolved) [I82.409]                 Anticoagulation Episode Summary       INR check location:  Anticoagulation Clinic    Preferred lab:      Send INR reminders to:      Comments:            Anticoagulation Care Providers       Provider Role Specialty Phone number    Jamari Platt M.D. Referring Endocrinology [phone]    [facility]   [phone]                  Refer to Patient Findings for HPI:  Patient Findings       Positives:  Change in medications (Been on Xeloda since beginning of April)    Negatives:  Signs/symptoms of thrombosis, Signs/symptoms of bleeding, Laboratory test error suspected, Change in health, Change in alcohol use, Change in activity, Upcoming invasive procedure, Emergency department visit, Upcoming dental procedure, Missed doses, Extra doses, Change in diet/appetite, Hospital admission, Bruising, Other complaints            There were no vitals filed for this visit.  Pt declined vitals    Verified current warfarin dosing schedule.    Medications reconciled: No  Pt is not on antiplatelet therapy.      A/P   INR is subtherapeutic - previous regimen reduced due to starting Xeloda    Warfarin dosing recommendation: Increase to 3.75 mg today and resume previous warfarin regimen.     Pt educated to contact our clinic with any changes in medications or s/s of bleeding or thrombosis. Pt is aware to seek immediate medical  attention for falls, head injury or deep cuts.    Request pt to return in 1 week(s). Pt agrees.    Lyndsey DominguezD

## 2024-04-11 NOTE — TELEPHONE ENCOUNTER
[facility]    Received anticoagulation clearance from [facility] regarding upcoming colonoscopy with biopsy.    Procedure date TBD    Pt is on warfarin for CVA and DVT.    Per current CHEST guidelines, pt is at moderate risk of VTE/stroke given last VTE and stroke over 90 days ago (pt's caregiver states last event was in 2001).             IF PT ON WARFARIN  Given pt's hx, will hold warfarin x 5 days prior to procedure and will not bridge w/ Lovenox.                Faxed clearance to [phone]; (will send to [facility] to scan in media)      Verito Quispe, PharmD

## 2024-04-19 ENCOUNTER — ANTICOAGULATION VISIT (OUTPATIENT)
Dept: VASCULAR LAB | Facility: MEDICAL CENTER | Age: 69
End: 2024-04-19
Attending: INTERNAL MEDICINE
Payer: MEDICARE

## 2024-04-19 DIAGNOSIS — Z79.01 CHRONIC ANTICOAGULATION: ICD-10-CM

## 2024-04-19 LAB — INR PPP: 5.7 (ref 2–3.5)

## 2024-04-19 PROCEDURE — 85610 PROTHROMBIN TIME: CPT

## 2024-04-19 PROCEDURE — 99212 OFFICE O/P EST SF 10 MIN: CPT

## 2024-04-19 NOTE — PROGRESS NOTES
Anticoagulation Summary  As of 2024      INR goal:  2.0-3.0   TTR:  73.7% (8.8 y)   INR used for dosin.70 (2024)   Warfarin maintenance plan:  1.25 mg (2.5 mg x 0.5) every Tue, Sat; 2.5 mg (2.5 mg x 1) all other days   Weekly warfarin total:  15 mg   Plan last modified:  Musa Deluca PharmD (4/3/2024)   Next INR check:  2024   Target end date:  Indefinite    Indications    Chronic anticoagulation [Z79.01]  Deep vein thrombosis [453.40] (Resolved) [I82.409]  Stroke [434.91] (Resolved) [I63.9]  Deep vein thrombosis (DVT) (HCC) (Resolved) [I82.409]                 Anticoagulation Episode Summary       INR check location:  Anticoagulation Clinic    Preferred lab:      Send INR reminders to:      Comments:            Anticoagulation Care Providers       Provider Role Specialty Phone number    Jamari Platt M.D. Referring Endocrinology [phone]    [facility]   [phone]          Refer to Patient Findings for HPI:  Patient Findings       Positives:  Change in diet/appetite (pt reports no appetite. not eating. did have a few cans of ensure this week.)    Negatives:  Signs/symptoms of thrombosis, Signs/symptoms of bleeding, Laboratory test error suspected, Change in health, Change in alcohol use, Change in activity, Upcoming invasive procedure, Emergency department visit, Upcoming dental procedure, Missed doses, Extra doses, Change in medications, Hospital admission, Bruising, Other complaints          There were no vitals filed for this visit.  The pt declined vitals today     Patient seen in clinic today for follow up on anticoagulation therapy with warfarin (a high risk medication) for hx of DVT and stroke  Verified current warfarin dosing schedule.  Patient denies any missed doses of warfarin.    Medications reconciled   Pt is not on antiplatelet therapy      A/P   INR is SUPRA-therapeutic today at 5.7.     Warfarin dosing recommendation: Patient instructed to HOLD  warfarin x 2 days, then to resume 2.5mg until next follow up appt.  Patient will return on Tues (next available) and will retest INR and further dosing will be given at that time.     Pt educated to contact our clinic with any changes in medications or s/s of bleeding or thrombosis. Pt is aware to seek immediate medical attention for falls, head injury or deep cuts.    Follow up appointment in 4 days    Next appt: Tues, April 23 @ 11:15am     Jaswant SolorioD

## 2024-04-21 ENCOUNTER — APPOINTMENT (OUTPATIENT)
Dept: RADIOLOGY | Facility: MEDICAL CENTER | Age: 69
End: 2024-04-21
Attending: EMERGENCY MEDICINE
Payer: MEDICARE

## 2024-04-21 ENCOUNTER — HOSPITAL ENCOUNTER (EMERGENCY)
Facility: MEDICAL CENTER | Age: 69
End: 2024-04-21
Attending: EMERGENCY MEDICINE
Payer: MEDICARE

## 2024-04-21 VITALS
WEIGHT: 150 LBS | RESPIRATION RATE: 18 BRPM | DIASTOLIC BLOOD PRESSURE: 59 MMHG | OXYGEN SATURATION: 94 % | HEART RATE: 82 BPM | HEIGHT: 72 IN | SYSTOLIC BLOOD PRESSURE: 105 MMHG | TEMPERATURE: 97.6 F | BODY MASS INDEX: 20.32 KG/M2

## 2024-04-21 DIAGNOSIS — K62.89 RECTAL PAIN: ICD-10-CM

## 2024-04-21 DIAGNOSIS — E87.6 HYPOKALEMIA: ICD-10-CM

## 2024-04-21 LAB
ALBUMIN SERPL BCP-MCNC: 3.9 G/DL (ref 3.2–4.9)
ALBUMIN/GLOB SERPL: 1.5 G/DL
ALP SERPL-CCNC: 59 U/L (ref 30–99)
ALT SERPL-CCNC: 26 U/L (ref 2–50)
ANION GAP SERPL CALC-SCNC: 9 MMOL/L (ref 7–16)
AST SERPL-CCNC: 22 U/L (ref 12–45)
BASOPHILS # BLD AUTO: 0.3 % (ref 0–1.8)
BASOPHILS # BLD: 0.01 K/UL (ref 0–0.12)
BILIRUB SERPL-MCNC: 1.3 MG/DL (ref 0.1–1.5)
BUN SERPL-MCNC: 30 MG/DL (ref 8–22)
CALCIUM ALBUM COR SERPL-MCNC: 8.9 MG/DL (ref 8.5–10.5)
CALCIUM SERPL-MCNC: 8.8 MG/DL (ref 8.5–10.5)
CHLORIDE SERPL-SCNC: 109 MMOL/L (ref 96–112)
CO2 SERPL-SCNC: 25 MMOL/L (ref 20–33)
CREAT SERPL-MCNC: 0.56 MG/DL (ref 0.5–1.4)
EOSINOPHIL # BLD AUTO: 0.26 K/UL (ref 0–0.51)
EOSINOPHIL NFR BLD: 7 % (ref 0–6.9)
ERYTHROCYTE [DISTWIDTH] IN BLOOD BY AUTOMATED COUNT: 49.1 FL (ref 35.9–50)
GFR SERPLBLD CREATININE-BSD FMLA CKD-EPI: 108 ML/MIN/1.73 M 2
GLOBULIN SER CALC-MCNC: 2.6 G/DL (ref 1.9–3.5)
GLUCOSE SERPL-MCNC: 107 MG/DL (ref 65–99)
HCT VFR BLD AUTO: 31.4 % (ref 42–52)
HGB BLD-MCNC: 10.9 G/DL (ref 14–18)
IMM GRANULOCYTES # BLD AUTO: 0.03 K/UL (ref 0–0.11)
IMM GRANULOCYTES NFR BLD AUTO: 0.8 % (ref 0–0.9)
LYMPHOCYTES # BLD AUTO: 0.59 K/UL (ref 1–4.8)
LYMPHOCYTES NFR BLD: 15.8 % (ref 22–41)
MCH RBC QN AUTO: 32.4 PG (ref 27–33)
MCHC RBC AUTO-ENTMCNC: 34.7 G/DL (ref 32.3–36.5)
MCV RBC AUTO: 93.5 FL (ref 81.4–97.8)
MONOCYTES # BLD AUTO: 0.32 K/UL (ref 0–0.85)
MONOCYTES NFR BLD AUTO: 8.6 % (ref 0–13.4)
NEUTROPHILS # BLD AUTO: 2.52 K/UL (ref 1.82–7.42)
NEUTROPHILS NFR BLD: 67.5 % (ref 44–72)
NRBC # BLD AUTO: 0 K/UL
NRBC BLD-RTO: 0 /100 WBC (ref 0–0.2)
PLATELET # BLD AUTO: 114 K/UL (ref 164–446)
PMV BLD AUTO: 8.9 FL (ref 9–12.9)
POTASSIUM SERPL-SCNC: 3.1 MMOL/L (ref 3.6–5.5)
PROT SERPL-MCNC: 6.5 G/DL (ref 6–8.2)
RBC # BLD AUTO: 3.36 M/UL (ref 4.7–6.1)
SODIUM SERPL-SCNC: 143 MMOL/L (ref 135–145)
WBC # BLD AUTO: 3.7 K/UL (ref 4.8–10.8)

## 2024-04-21 PROCEDURE — 80053 COMPREHEN METABOLIC PANEL: CPT

## 2024-04-21 PROCEDURE — 96375 TX/PRO/DX INJ NEW DRUG ADDON: CPT

## 2024-04-21 PROCEDURE — 74177 CT ABD & PELVIS W/CONTRAST: CPT

## 2024-04-21 PROCEDURE — 36415 COLL VENOUS BLD VENIPUNCTURE: CPT

## 2024-04-21 PROCEDURE — 700117 HCHG RX CONTRAST REV CODE 255: Performed by: EMERGENCY MEDICINE

## 2024-04-21 PROCEDURE — 96374 THER/PROPH/DIAG INJ IV PUSH: CPT

## 2024-04-21 PROCEDURE — 700111 HCHG RX REV CODE 636 W/ 250 OVERRIDE (IP): Performed by: EMERGENCY MEDICINE

## 2024-04-21 PROCEDURE — 99284 EMERGENCY DEPT VISIT MOD MDM: CPT

## 2024-04-21 PROCEDURE — 85025 COMPLETE CBC W/AUTO DIFF WBC: CPT

## 2024-04-21 RX ORDER — MORPHINE SULFATE 4 MG/ML
4 INJECTION INTRAVENOUS ONCE
Status: COMPLETED | OUTPATIENT
Start: 2024-04-21 | End: 2024-04-21

## 2024-04-21 RX ORDER — ONDANSETRON 2 MG/ML
4 INJECTION INTRAMUSCULAR; INTRAVENOUS ONCE
Status: COMPLETED | OUTPATIENT
Start: 2024-04-21 | End: 2024-04-21

## 2024-04-21 RX ADMIN — ONDANSETRON 4 MG: 2 INJECTION INTRAMUSCULAR; INTRAVENOUS at 09:31

## 2024-04-21 RX ADMIN — IOHEXOL 95 ML: 350 INJECTION, SOLUTION INTRAVENOUS at 10:34

## 2024-04-21 RX ADMIN — MORPHINE SULFATE 4 MG: 4 INJECTION INTRAVENOUS at 09:32

## 2024-04-21 ASSESSMENT — FIBROSIS 4 INDEX: FIB4 SCORE: 1.95

## 2024-04-21 ASSESSMENT — PAIN DESCRIPTION - PAIN TYPE: TYPE: ACUTE PAIN

## 2024-04-21 NOTE — ED TRIAGE NOTES
Chief Complaint   Patient presents with    Numbness     Pt reports to have CA removed from his glute area last month since then pt has been experiencing left hand numbness to palm, pain to index finger an pain to surgical area.     Low Back Pain     Explained to pt triage process, made pt aware to tell this RN/staff of any changes/concerns, pt verbalized understanding of process and instructions given. Pt to ER [place].

## 2024-04-21 NOTE — ED PROVIDER NOTES
ED PHYSICIAN NOTE    CHIEF COMPLAINT  Chief Complaint   Patient presents with    Numbness     Pt reports to have CA removed from his glute area last month since then pt has been experiencing left hand numbness to palm, pain to index finger an pain to surgical area.     Low Back Pain       EXTERNAL RECORDS REVIEWED  Inpatient Notes patient underwent exam under anesthesia and rectal biopsy by Dr. Garcia on 2/6/2024    HPI/ROS    OUTSIDE HISTORIAN(S):   reports patient has not had a bowel movement in several days    Drew Melton is a 68 y.o. male who presents with rectal pain.  Patient has a history of rectal cancer.  He recently had a biopsy.  He is going radiation.  He has pain near the anus.  Started about 5 or 6 days ago.  He feels like he has to have a bowel movement but has not had bowel movement.  He has not had fever.  No vomiting.  He has had some mild lower abdominal cramping.  No dysuria hematuria.    Patient second complaint is of some tingling over his left palm.  This spares the digits.  He does not have any weakness.  The patient has a history of stroke about 10 years ago with residual right-sided deficits as well as speech difficulty.  He denies any other stroke symptoms.  No headache.    PAST MEDICAL HISTORY  Past Medical History:   Diagnosis Date    Cancer (formerly [facility]) 01/16/2024    rectal    Cataract 01/16/2024    non surgically repaired    Cognitive deficits, late effect of cerebrovascular disease     Cold 01/01/2018    Dental disorder     no teeth    Hemiplegia affecting dominant side, late effect of cerebrovascular disease     High cholesterol 01/16/2024    no meds    Homonymous bilateral field defects in visual field     [facility]    Stroke (formerly [facility]) 09/01/2001    Right sided paralysis    Unspecified late effects of cerebrovascular disease     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       SOCIAL HISTORY  Social History     Tobacco Use    Smoking status: Former     Current packs/day: 0.00      Types: Cigarettes     Quit date: 2001     Years since quittin.6    Smokeless tobacco: Never   Vaping Use    Vaping Use: Never used   Substance Use Topics    Alcohol use: No     Alcohol/week: 0.0 oz     Comment: former abuser    Drug use: No       CURRENT MEDICATIONS  Home Medications    **Home medications have not yet been reviewed for this encounter**         ALLERGIES  No Known Allergies    PHYSICAL EXAM  VITAL SIGNS: BP (!) 146/75   Pulse 95   Temp 36.2 °C (97.1 °F) (Temporal)   Resp 18   Ht 1.829 m (6')   Wt 68 kg (150 lb)   SpO2 97%   BMI 20.34 kg/m²    Constitutional: Awake and alert  HENT: Normal inspection  Eyes: Normal inspection  Neck: Grossly normal range of motion.  Cardiovascular: Normal heart rate, Normal rhythm.  Symmetric peripheral pulses.   Thorax & Lungs: No respiratory distress, No wheezing, No rales, No rhonchi, No chest tenderness.   Abdomen: Bowel sounds normal, soft, non-distended, nontender, no mass  Rectal: Erythema around the anus with some stool leakage.  No identified mass or focal area of tenderness.  Extremities: Extremities well-perfused  Neurologic: Expressive aphasia.  Facial droop.  Hemiparesis.  Reported unchanged from prior.  New findings tingling over the left palm.  Station is intact.  No tingling on the dorsal aspect of the hand or digits.  5/5 EPL, FPL, wrist extension, interosseous, bicep, tricep on the left arm.  Good radial pulse.  Psychiatric: Normal for situation     DIAGNOSTIC STUDIES / PROCEDURES  LABS/EKG  Results for orders placed or performed during the hospital encounter of 24   CBC WITH DIFFERENTIAL   Result Value Ref Range    WBC 3.7 (L) 4.8 - 10.8 K/uL    RBC 3.36 (L) 4.70 - 6.10 M/uL    Hemoglobin 10.9 (L) 14.0 - 18.0 g/dL    Hematocrit 31.4 (L) 42.0 - 52.0 %    MCV 93.5 81.4 - 97.8 fL    MCH 32.4 27.0 - 33.0 pg    MCHC 34.7 32.3 - 36.5 g/dL    RDW 49.1 35.9 - 50.0 fL    Platelet Count 114 (L) 164 - 446 K/uL    MPV 8.9 (L) 9.0 - 12.9 fL     Neutrophils-Polys 67.50 44.00 - 72.00 %    Lymphocytes 15.80 (L) 22.00 - 41.00 %    Monocytes 8.60 0.00 - 13.40 %    Eosinophils 7.00 (H) 0.00 - 6.90 %    Basophils 0.30 0.00 - 1.80 %    Immature Granulocytes 0.80 0.00 - 0.90 %    Nucleated RBC 0.00 0.00 - 0.20 /100 WBC    Neutrophils (Absolute) 2.52 1.82 - 7.42 K/uL    Lymphs (Absolute) 0.59 (L) 1.00 - 4.80 K/uL    Monos (Absolute) 0.32 0.00 - 0.85 K/uL    Eos (Absolute) 0.26 0.00 - 0.51 K/uL    Baso (Absolute) 0.01 0.00 - 0.12 K/uL    Immature Granulocytes (abs) 0.03 0.00 - 0.11 K/uL    NRBC (Absolute) 0.00 K/uL   COMP METABOLIC PANEL   Result Value Ref Range    Sodium 143 135 - 145 mmol/L    Potassium 3.1 (L) 3.6 - 5.5 mmol/L    Chloride 109 96 - 112 mmol/L    Co2 25 20 - 33 mmol/L    Anion Gap 9.0 7.0 - 16.0    Glucose 107 (H) 65 - 99 mg/dL    Bun 30 (H) 8 - 22 mg/dL    Creatinine 0.56 0.50 - 1.40 mg/dL    Calcium 8.8 8.5 - 10.5 mg/dL    Correct Calcium 8.9 8.5 - 10.5 mg/dL    AST(SGOT) 22 12 - 45 U/L    ALT(SGPT) 26 2 - 50 U/L    Alkaline Phosphatase 59 30 - 99 U/L    Total Bilirubin 1.3 0.1 - 1.5 mg/dL    Albumin 3.9 3.2 - 4.9 g/dL    Total Protein 6.5 6.0 - 8.2 g/dL    Globulin 2.6 1.9 - 3.5 g/dL    A-G Ratio 1.5 g/dL   ESTIMATED GFR   Result Value Ref Range    GFR (CKD-EPI) 108 >60 mL/min/1.73 m 2          RADIOLOGY  Radiologist interpretation:   CT-ABDOMEN-PELVIS WITH   Final Result      1.  No evidence of bowel obstruction or focal inflammatory change.      2.  Linear scarring within the right lung base.      3.  Right renal cortical scarring with right renal parapelvic cyst.      4.  Fatty change of the liver with multiple hepatic cysts or hemangiomata.      5.  Sigmoid diverticulosis.      6.  Chronic L3 compression fracture.          COURSE & MEDICAL DECISION MAKING    INITIAL ASSESSMENT, COURSE AND PLAN  Care Narrative: Patient presents with rectal pain.  Known history of anal cancer.  Reports starting radiation.  This could certainly be the cause of  pain but he does have some abdominal cramping and reports he has not had a bowel movement.  Will obtain CT abdomen.  Obtain screening laboratory data.  Will treat anal discomfort with morphine and Zofran.  Second complaint is of left palmar numbness.  Cannot explain this.  He does not have any weakness.  Not in any particular nerve root distribution.  His sensory is intact.  Is unlikely this would be secondary to stroke.  Will monitor.    Laboratory data returned as noted above.  He has moderate hypokalemia.  This should be amenable to dietary replenishment.  Have given instructions on hypokalemia.  CBC with mild pancytopenia.  No alarming findings.    CT scan of the abdomen and pelvis negative for obstruction or other emergent process.  He does not have an abscess.  His symptoms were improved with treatment.    At this point I suspect his anal/rectal pain is related to previous surgery and surgery.  I have advised him to discuss this with his oncologist.  Have advised a high-fiber diet and plenty of fluids to good bowel movements.  Patient will be discharged to return to the ER for fever, increasing pain, not improving or concern.          ADDITIONAL PROBLEM LIST  Hypokalemia-advised dietary replenishment.  Given handout  Constipation-advised MiraLAX, high-fiber diet, plenty of fluids  Paresthesia, left hand-monitor.  Return for any weakness, worsening, not improving concern    DISPOSITION AND DISCUSSIONS    Escalation of care considered, and ultimately not performed: Considered CVA evaluation, but findings unlikely to be consistent with this.    FINAL IMPRESSION  1.  Rectal pain    This dictation was created using voice recognition software. The accuracy of the dictation is limited to the abilities of the software. I expect there may be some errors of grammar and possibly content. The nursing notes were reviewed and certain aspects of this information were incorporated into this note.    Electronically signed by:  Ismael Johnson M.D., 4/21/2024

## 2024-04-21 NOTE — ED NOTES
Pt brought back to [address] from [facility] in home WC. Pt able to transfer self to bed with assist x1, in gown, on monitor, family at bedside, call light in reach. Chart up for ERP.

## 2024-04-21 NOTE — ED NOTES
Discharge orders received, IV and monitor discontinued, instructions and education given, follow-up discussed, pt verbalized understanding.

## 2024-04-23 ENCOUNTER — ANTICOAGULATION VISIT (OUTPATIENT)
Dept: VASCULAR LAB | Facility: MEDICAL CENTER | Age: 69
End: 2024-04-23
Attending: INTERNAL MEDICINE
Payer: MEDICARE

## 2024-04-23 DIAGNOSIS — Z79.01 CHRONIC ANTICOAGULATION: ICD-10-CM

## 2024-04-23 LAB — INR PPP: 4.3 (ref 2–3.5)

## 2024-04-23 PROCEDURE — 99212 OFFICE O/P EST SF 10 MIN: CPT

## 2024-04-23 PROCEDURE — 85610 PROTHROMBIN TIME: CPT

## 2024-04-23 NOTE — PROGRESS NOTES
Anticoagulation Summary  As of 2024      INR goal:  2.0-3.0   TTR:  73.6% (8.8 y)   INR used for dosin.30 (2024)   Warfarin maintenance plan:  2.5 mg (2.5 mg x 1) every Mon, Wed, Fri; 1.25 mg (2.5 mg x 0.5) all other days   Weekly warfarin total:  12.5 mg   Plan last modified:  Verito Quispe, PharmD (2024)   Next INR check:  2024   Target end date:  Indefinite    Indications    Chronic anticoagulation [Z79.01]  Deep vein thrombosis [453.40] (Resolved) [I82.409]  Stroke [434.91] (Resolved) [I63.9]  Deep vein thrombosis (DVT) (HCC) (Resolved) [I82.409]                 Anticoagulation Episode Summary       INR check location:  Anticoagulation Clinic    Preferred lab:      Send INR reminders to:      Comments:            Anticoagulation Care Providers       Provider Role Specialty Phone number    Jamari Platt M.D. Referring Endocrinology [phone]    [facility]   [phone]                  Refer to Patient Findings for HPI:  Patient Findings       Positives:  Emergency department visit (GI concerns s/p rectal biopsy), Change in diet/appetite (still has lack of appetite)    Negatives:  Signs/symptoms of thrombosis, Signs/symptoms of bleeding, Laboratory test error suspected, Change in health, Change in alcohol use, Change in activity, Upcoming invasive procedure, Upcoming dental procedure, Missed doses, Extra doses, Change in medications, Hospital admission, Bruising, Other complaints            There were no vitals filed for this visit.    Verified current warfarin dosing schedule.    Medications reconciled: Yes  Pt is not on antiplatelet therapy.      A/P   INR is supratherapeutic    Warfarin dosing recommendation: Hold x2 days, then Decrease to 2.5mg MWF, 1.25mg AOD    Pt educated to contact our clinic with any changes in medications or s/s of bleeding or thrombosis. Pt is aware to seek immediate medical attention for falls, head injury or deep  cuts.    Request pt to return in 3 day(s). Pt declines despite warning of extending their follow up interval. F/u scheduled for 1 week.    Verito Quispe, PharmD

## 2024-04-25 ENCOUNTER — APPOINTMENT (OUTPATIENT)
Dept: VASCULAR LAB | Facility: MEDICAL CENTER | Age: 69
End: 2024-04-25
Attending: INTERNAL MEDICINE
Payer: MEDICARE

## 2024-04-26 ENCOUNTER — HOSPITAL ENCOUNTER (EMERGENCY)
Facility: MEDICAL CENTER | Age: 69
DRG: 948 | End: 2024-04-26
Attending: EMERGENCY MEDICINE
Payer: MEDICARE

## 2024-04-26 VITALS
BODY MASS INDEX: 20.32 KG/M2 | DIASTOLIC BLOOD PRESSURE: 75 MMHG | HEIGHT: 72 IN | HEART RATE: 97 BPM | TEMPERATURE: 97.9 F | OXYGEN SATURATION: 92 % | SYSTOLIC BLOOD PRESSURE: 125 MMHG | WEIGHT: 150 LBS | RESPIRATION RATE: 20 BRPM
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
## 2024-04-26 DIAGNOSIS — R52 PAIN AFTER RADIATION THERAPY: Primary | ICD-10-CM

## 2024-04-26 DIAGNOSIS — C21.0 ANAL CANCER (HCC): ICD-10-CM

## 2024-04-26 PROCEDURE — 96374 THER/PROPH/DIAG INJ IV PUSH: CPT

## 2024-04-26 PROCEDURE — 700101 HCHG RX REV CODE 250: Performed by: EMERGENCY MEDICINE

## 2024-04-26 PROCEDURE — 700111 HCHG RX REV CODE 636 W/ 250 OVERRIDE (IP): Performed by: EMERGENCY MEDICINE

## 2024-04-26 PROCEDURE — 99284 EMERGENCY DEPT VISIT MOD MDM: CPT

## 2024-04-26 RX ORDER — OXYCODONE HYDROCHLORIDE AND ACETAMINOPHEN 5; 325 MG/1; MG/1
1 TABLET ORAL EVERY 6 HOURS PRN
Qty: 12 TABLET | Refills: 0 | Status: SHIPPED | OUTPATIENT
Start: 2024-04-26 | End: 2024-04-28

## 2024-04-26 RX ORDER — MORPHINE SULFATE 4 MG/ML
4 INJECTION INTRAVENOUS ONCE
Status: COMPLETED | OUTPATIENT
Start: 2024-04-26 | End: 2024-04-26

## 2024-04-26 RX ORDER — DOCUSATE SODIUM 100 MG/1
100 CAPSULE, LIQUID FILLED ORAL 2 TIMES DAILY
Qty: 60 CAPSULE | Refills: 0 | Status: ON HOLD | OUTPATIENT
Start: 2024-04-26 | End: 2024-05-01

## 2024-04-26 RX ORDER — LIDOCAINE HYDROCHLORIDE 20 MG/ML
JELLY TOPICAL ONCE
Status: COMPLETED | OUTPATIENT
Start: 2024-04-26 | End: 2024-04-26

## 2024-04-26 RX ADMIN — MORPHINE SULFATE 4 MG: 4 INJECTION INTRAVENOUS at 03:34

## 2024-04-26 RX ADMIN — LIDOCAINE HYDROCHLORIDE 6 ML: 20 JELLY TOPICAL at 03:33

## 2024-04-26 ASSESSMENT — FIBROSIS 4 INDEX: FIB4 SCORE: 2.54

## 2024-04-26 ASSESSMENT — PAIN DESCRIPTION - PAIN TYPE: TYPE: ACUTE PAIN

## 2024-04-26 NOTE — ED PROVIDER NOTES
ER Provider Note    Scribed for Blake Ragland MD by Rika Reilly. 4/26/2024  3:18 AM    Primary Care Provider: MARY Albright    CHIEF COMPLAINT   Chief Complaint   Patient presents with    Pain     Patient has h/o of anal Ca; on radiation therapy; had his radiation yesterday and now complaining of severe butt pain; 10/10;    Denies any fever, diarrhea or any constipation,     EXTERNAL RECORDS REVIEWED  None  HPI/ROS  LIMITATION TO HISTORY   Select: : None  OUTSIDE HISTORIAN(S):  None    Drew Melton is a 68 y.o. male with history of rectal cancer who presents to the ED for evaluation of butt pain onset yesterday. Patient explains he has been receiving radiation treatment, stating his most recent treatment was yesterday. He states after that he has been experiencing severe butt pain, rating it 10/10. He states even standing hurts. He reports that he has pain medications, but they have not been working. Patient denies fever or vomiting. He has history of stroke.     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     [facility]    Stroke ([facility]) 09/01/2001    Right sided paralysis    Unspecified late effects of cerebrovascular disease     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       SURGICAL HISTORY  Past Surgical History:   Procedure Laterality Date    VA EXCIS RECTAL TUMOR, TRANSANAL, PARTIAL TH* N/A 2/6/2024    Procedure: INCISIONAL BIOPSY OF PERIANAL MASS;  Surgeon: Brigido Garcia MD, PhD;  Location: SURGERY [facility];  Service: General    COLONOSCOPY  1/24/2018    Procedure: COLONOSCOPY;  Surgeon: Lenin  LISS Danielle M.D.;  Location: SURGERY [facility];  Service: Gastroenterology    AMPUTATION, BELOW THE KNEE Right 2001    OTHER      cataract IOLI       FAMILY HISTORY  Family History   Problem Relation Age of Onset    No Known Problems Mother     No Known Problems Father     No Known Problems Maternal Grandmother     No Known Problems Maternal Grandfather     No Known Problems Paternal Grandmother     No Known Problems Paternal Grandfather     Stroke Neg Hx     Diabetes Neg Hx     Cancer Neg Hx     Heart Disease Neg Hx     Hypertension Neg Hx     Hyperlipidemia Neg Hx        SOCIAL HISTORY   reports that he quit smoking about 22 years ago. His smoking use included cigarettes. He has never used smokeless tobacco. He reports that he does not drink alcohol and does not use drugs.    CURRENT MEDICATIONS  Discharge Medication List as of 4/26/2024  4:23 AM        CONTINUE these medications which have NOT CHANGED    Details   capecitabine (XELODA) 500 MG tablet Take 1,500 mg/m2 by mouth 2 times a day., Historical Med      warfarin (COUMADIN) 2.5 MG Tab TAKE 1/2 TO 1 (ONE-HALF TO ONE) TABLET BY MOUTH ONCE DAILY AS DIRECTED BY THE COUMADIN CLINIC, Disp-90 Tablet, R-3, Normal             ALLERGIES  Patient has no known allergies.    PHYSICAL EXAM  /77   Pulse (!) 109   Temp 36.4 °C (97.5 °F) (Temporal)   Resp 14   Ht 1.829 m (6')   Wt 68 kg (150 lb)   SpO2 97%   BMI 20.34 kg/m²   Physical Exam  Vitals and nursing note reviewed.   Constitutional:       Appearance: Normal appearance.   Cardiovascular:      Rate and Rhythm: Regular rhythm. Tachycardia present.   Pulmonary:      Effort: Pulmonary effort is normal.      Breath sounds: Normal breath sounds.   Abdominal:      General: There is no distension.      Tenderness: There is no abdominal tenderness.   Genitourinary:     Comments: At anus there is tenderness, redness. No induration or fluctuance. No bleeding  Musculoskeletal:      Comments: Right leg BKA.     Neurological:      Mental Status: He is alert.      Comments: Right upper and lower extremity weakness.        COURSE & MEDICAL DECISION MAKING     ASSESSMENT, COURSE AND PLAN  Care Narrative:   Patient is a 68 year old male that presents to the ED for evaluation of severe butt pain. He explains that he has been receiving radiation treatment for his rectal cancer, noting his most recent appointment being yesterday. He states he soon after began experiencing butt pain, adding that it has become difficult for him to even stand. Patient reports no alleviation from his own pain medications. I will be ordering Morphine 4 Mg/Ml Injection to alleviate symptoms. Exam with findings of tenderness at skin around anus without induration, fluctuance.    4:07 AM - Patient reports feeling improved after medication. Patient had the opportunity to ask any questions. The plan for discharge was discussed with them and they were told to return for any new or worsening symptoms. He has follow up appointment today at 9am. Prescribed percocet for any further breakthrough pain he might have. Also prescribed stool softener to go with this. Discussed risks opiate use including constipation which could be more irritating for anal pain. Patient is understanding and agreeable to the plan for discharge.        PROBLEM LIST  #Pain secondary to radiation treatment    DISPOSITION AND DISCUSSIONS  I have discussed management of the patient with the following physicians and YESIKA's:  None    Discussion of management with other [facility] or appropriate source(s): None     Barriers to care at this time, including but not limited to:  None .     Decision tools and prescription drugs considered including, but not limited to:  Percocet .    I reviewed prescription monitoring program for patient's narcotic use before prescribing a scheduled drug.The patient will not drink alcohol nor drive with prescribed medications. The patient will return for new or worsening  symptoms and is stable at the time of discharge.    The patient is referred to a primary physician for blood pressure management, diabetic screening, and for all other preventative health concerns.    In prescribing controlled substances to this patient, I certify that I have obtained and reviewed the medical history of Drew Melton. I have also made a good linda effort to obtain applicable records from other providers who have treated the patient and no other records are available at this time.     I have conducted a physical exam and documented it. I have reviewed Mr. Melton’s prescription history as maintained by the [place] Prescription Monitoring Program.     I have assessed the patient’s risk for abuse, dependency, and addiction using the validated Opioid Risk Tool available at https://www.mdcalc.com/qssclc-fnxc-haaq-ort-narcotic-abuse.     Given the above, I believe the benefits of controlled substance therapy outweigh the risks. The reasons for prescribing controlled substances include non-narcotic, oral analgesic alternatives have been inadequate for pain control and in my professional opinion, controlled substances are the only reasonable choice for this patient because painful condition . Accordingly, I have discussed the risk and benefits, treatment plan, and alternative therapies with the patient.       DISPOSITION:  Patient will be discharged home in stable condition.    FOLLOW UP:  Discuss further pain control with your doctor later today          OUTPATIENT MEDICATIONS:  Discharge Medication List as of 4/26/2024  4:23 AM        START taking these medications    Details   oxyCODONE-acetaminophen (PERCOCET) 5-325 MG Tab Take 1 Tablet by mouth every 6 hours as needed for Moderate Pain for up to 6 days., Disp-12 Tablet, R-0, Normal           FINAL DIANGOSIS  1. Pain after radiation therapy Active   2. Anal cancer (HCC)      IRika (Scribe), am scribing for, and in the presence of,  SRIKANTH Peace II.    Electronically signed by: Rika Reilly (Scribe), 4/26/2024    IBlake II, M* personally performed the services described in this documentation, as scribed by Rika Reilly in my presence, and it is both accurate and complete.       The note accurately reflects work and decisions made by me.  Blake Ragland II, M.D.  4/26/2024  5:57 AM

## 2024-04-26 NOTE — ED TRIAGE NOTES
Chief Complaint   Patient presents with    Pain     Patient has h/o of anal Ca; on radiation therapy; had his radiation yesterday and now complaining of severe butt pain; 10/10;    Denies any fever, diarrhea or any constipation,       Pt to triage per WC; noted right BKA, AOX4 GCS15;   Pt back to lobby, educated on triage process and encourage to alert staff of any changes.     Vitals:    04/26/24 0219   BP: 131/77   Pulse: (!) 109   Resp: 14   Temp: 36.4 °C (97.5 °F)   SpO2: 97%

## 2024-04-28 ENCOUNTER — APPOINTMENT (OUTPATIENT)
Dept: RADIOLOGY | Facility: MEDICAL CENTER | Age: 69
DRG: 948 | End: 2024-04-28
Attending: EMERGENCY MEDICINE
Payer: MEDICARE

## 2024-04-28 ENCOUNTER — HOSPITAL ENCOUNTER (INPATIENT)
Facility: MEDICAL CENTER | Age: 69
DRG: 948 | End: 2024-04-28
Attending: EMERGENCY MEDICINE | Admitting: STUDENT IN AN ORGANIZED HEALTH CARE EDUCATION/TRAINING PROGRAM
Payer: MEDICARE

## 2024-04-28 DIAGNOSIS — T45.1X5A LEUKOPENIA DUE TO ANTINEOPLASTIC CHEMOTHERAPY (HCC): ICD-10-CM

## 2024-04-28 DIAGNOSIS — Z89.519 S/P BKA (BELOW KNEE AMPUTATION) UNILATERAL (HCC): ICD-10-CM

## 2024-04-28 DIAGNOSIS — R53.81 PHYSICAL DECONDITIONING: ICD-10-CM

## 2024-04-28 DIAGNOSIS — E87.6 HYPOKALEMIA: ICD-10-CM

## 2024-04-28 DIAGNOSIS — D70.1 LEUKOPENIA DUE TO ANTINEOPLASTIC CHEMOTHERAPY (HCC): ICD-10-CM

## 2024-04-28 DIAGNOSIS — G89.3 CANCER ASSOCIATED PAIN: ICD-10-CM

## 2024-04-28 DIAGNOSIS — E86.0 DEHYDRATION: ICD-10-CM

## 2024-04-28 DIAGNOSIS — C20 RECTAL CANCER (HCC): ICD-10-CM

## 2024-04-28 DIAGNOSIS — K62.89 RECTAL PAIN: ICD-10-CM

## 2024-04-28 DIAGNOSIS — R79.1 SUPRATHERAPEUTIC INR: ICD-10-CM

## 2024-04-28 DIAGNOSIS — C21.0 ANAL CANCER (HCC): ICD-10-CM

## 2024-04-28 PROBLEM — Z71.89 ACP (ADVANCE CARE PLANNING): Status: ACTIVE | Noted: 2020-07-30

## 2024-04-28 LAB
ALBUMIN SERPL BCP-MCNC: 4.3 G/DL (ref 3.2–4.9)
ALBUMIN/GLOB SERPL: 1.5 G/DL
ALP SERPL-CCNC: 70 U/L (ref 30–99)
ALT SERPL-CCNC: 43 U/L (ref 2–50)
ANION GAP SERPL CALC-SCNC: 15 MMOL/L (ref 7–16)
AST SERPL-CCNC: 31 U/L (ref 12–45)
BASOPHILS # BLD AUTO: 0.5 % (ref 0–1.8)
BASOPHILS # BLD: 0.02 K/UL (ref 0–0.12)
BILIRUB SERPL-MCNC: 2.4 MG/DL (ref 0.1–1.5)
BUN SERPL-MCNC: 22 MG/DL (ref 8–22)
CALCIUM ALBUM COR SERPL-MCNC: 9.3 MG/DL (ref 8.5–10.5)
CALCIUM SERPL-MCNC: 9.5 MG/DL (ref 8.5–10.5)
CHLORIDE SERPL-SCNC: 106 MMOL/L (ref 96–112)
CO2 SERPL-SCNC: 23 MMOL/L (ref 20–33)
CORTIS SERPL-MCNC: 12.8 UG/DL (ref 0–23)
CREAT SERPL-MCNC: 0.65 MG/DL (ref 0.5–1.4)
CRP SERPL HS-MCNC: 1.25 MG/DL (ref 0–0.75)
EOSINOPHIL # BLD AUTO: 0.06 K/UL (ref 0–0.51)
EOSINOPHIL NFR BLD: 1.4 % (ref 0–6.9)
ERYTHROCYTE [DISTWIDTH] IN BLOOD BY AUTOMATED COUNT: 61.2 FL (ref 35.9–50)
ERYTHROCYTE [SEDIMENTATION RATE] IN BLOOD BY WESTERGREN METHOD: 81 MM/HOUR (ref 0–20)
GFR SERPLBLD CREATININE-BSD FMLA CKD-EPI: 103 ML/MIN/1.73 M 2
GLOBULIN SER CALC-MCNC: 2.8 G/DL (ref 1.9–3.5)
GLUCOSE SERPL-MCNC: 105 MG/DL (ref 65–99)
HCT VFR BLD AUTO: 31.9 % (ref 42–52)
HGB BLD-MCNC: 11.8 G/DL (ref 14–18)
IMM GRANULOCYTES # BLD AUTO: 0.02 K/UL (ref 0–0.11)
IMM GRANULOCYTES NFR BLD AUTO: 0.5 % (ref 0–0.9)
INR PPP: 7.67 (ref 0.87–1.13)
LDH SERPL L TO P-CCNC: 386 U/L (ref 107–266)
LIPASE SERPL-CCNC: 36 U/L (ref 11–82)
LYMPHOCYTES # BLD AUTO: 0.36 K/UL (ref 1–4.8)
LYMPHOCYTES NFR BLD: 8.1 % (ref 22–41)
MAGNESIUM SERPL-MCNC: 1.7 MG/DL (ref 1.5–2.5)
MCH RBC QN AUTO: 33.8 PG (ref 27–33)
MCHC RBC AUTO-ENTMCNC: 37 G/DL (ref 32.3–36.5)
MCV RBC AUTO: 91.4 FL (ref 81.4–97.8)
MONOCYTES # BLD AUTO: 0.48 K/UL (ref 0–0.85)
MONOCYTES NFR BLD AUTO: 10.8 % (ref 0–13.4)
NEUTROPHILS # BLD AUTO: 3.49 K/UL (ref 1.82–7.42)
NEUTROPHILS NFR BLD: 78.7 % (ref 44–72)
NRBC # BLD AUTO: 0 K/UL
NRBC BLD-RTO: 0 /100 WBC (ref 0–0.2)
PLATELET # BLD AUTO: 150 K/UL (ref 164–446)
PMV BLD AUTO: 9.4 FL (ref 9–12.9)
POTASSIUM SERPL-SCNC: 2.7 MMOL/L (ref 3.6–5.5)
PROCALCITONIN SERPL-MCNC: 0.05 NG/ML
PROT SERPL-MCNC: 7.1 G/DL (ref 6–8.2)
PROTHROMBIN TIME: 66.2 SEC (ref 12–14.6)
RBC # BLD AUTO: 3.49 M/UL (ref 4.7–6.1)
SODIUM SERPL-SCNC: 144 MMOL/L (ref 135–145)
WBC # BLD AUTO: 4.4 K/UL (ref 4.8–10.8)

## 2024-04-28 PROCEDURE — 96366 THER/PROPH/DIAG IV INF ADDON: CPT

## 2024-04-28 PROCEDURE — 83615 LACTATE (LD) (LDH) ENZYME: CPT

## 2024-04-28 PROCEDURE — A9270 NON-COVERED ITEM OR SERVICE: HCPCS | Mod: JZ | Performed by: STUDENT IN AN ORGANIZED HEALTH CARE EDUCATION/TRAINING PROGRAM

## 2024-04-28 PROCEDURE — 82533 TOTAL CORTISOL: CPT

## 2024-04-28 PROCEDURE — 96367 TX/PROPH/DG ADDL SEQ IV INF: CPT

## 2024-04-28 PROCEDURE — 770001 HCHG ROOM/CARE - MED/SURG/GYN PRIV*

## 2024-04-28 PROCEDURE — 700101 HCHG RX REV CODE 250: Performed by: STUDENT IN AN ORGANIZED HEALTH CARE EDUCATION/TRAINING PROGRAM

## 2024-04-28 PROCEDURE — 85610 PROTHROMBIN TIME: CPT

## 2024-04-28 PROCEDURE — 700111 HCHG RX REV CODE 636 W/ 250 OVERRIDE (IP): Performed by: EMERGENCY MEDICINE

## 2024-04-28 PROCEDURE — 99285 EMERGENCY DEPT VISIT HI MDM: CPT

## 2024-04-28 PROCEDURE — 97602 WOUND(S) CARE NON-SELECTIVE: CPT

## 2024-04-28 PROCEDURE — 83735 ASSAY OF MAGNESIUM: CPT

## 2024-04-28 PROCEDURE — C9113 INJ PANTOPRAZOLE SODIUM, VIA: HCPCS | Performed by: STUDENT IN AN ORGANIZED HEALTH CARE EDUCATION/TRAINING PROGRAM

## 2024-04-28 PROCEDURE — 85652 RBC SED RATE AUTOMATED: CPT

## 2024-04-28 PROCEDURE — 84145 PROCALCITONIN (PCT): CPT

## 2024-04-28 PROCEDURE — 700105 HCHG RX REV CODE 258: Performed by: EMERGENCY MEDICINE

## 2024-04-28 PROCEDURE — 96375 TX/PRO/DX INJ NEW DRUG ADDON: CPT

## 2024-04-28 PROCEDURE — 99497 ADVNCD CARE PLAN 30 MIN: CPT | Performed by: STUDENT IN AN ORGANIZED HEALTH CARE EDUCATION/TRAINING PROGRAM

## 2024-04-28 PROCEDURE — A9270 NON-COVERED ITEM OR SERVICE: HCPCS | Mod: JZ | Performed by: EMERGENCY MEDICINE

## 2024-04-28 PROCEDURE — 80053 COMPREHEN METABOLIC PANEL: CPT

## 2024-04-28 PROCEDURE — 96365 THER/PROPH/DIAG IV INF INIT: CPT

## 2024-04-28 PROCEDURE — 700102 HCHG RX REV CODE 250 W/ 637 OVERRIDE(OP): Mod: JZ | Performed by: STUDENT IN AN ORGANIZED HEALTH CARE EDUCATION/TRAINING PROGRAM

## 2024-04-28 PROCEDURE — 96376 TX/PRO/DX INJ SAME DRUG ADON: CPT

## 2024-04-28 PROCEDURE — 83690 ASSAY OF LIPASE: CPT

## 2024-04-28 PROCEDURE — 85025 COMPLETE CBC W/AUTO DIFF WBC: CPT

## 2024-04-28 PROCEDURE — 36415 COLL VENOUS BLD VENIPUNCTURE: CPT

## 2024-04-28 PROCEDURE — 700102 HCHG RX REV CODE 250 W/ 637 OVERRIDE(OP): Mod: JZ | Performed by: EMERGENCY MEDICINE

## 2024-04-28 PROCEDURE — 700111 HCHG RX REV CODE 636 W/ 250 OVERRIDE (IP): Performed by: STUDENT IN AN ORGANIZED HEALTH CARE EDUCATION/TRAINING PROGRAM

## 2024-04-28 PROCEDURE — 99222 1ST HOSP IP/OBS MODERATE 55: CPT | Mod: 25,AI | Performed by: STUDENT IN AN ORGANIZED HEALTH CARE EDUCATION/TRAINING PROGRAM

## 2024-04-28 PROCEDURE — 96368 THER/DIAG CONCURRENT INF: CPT

## 2024-04-28 PROCEDURE — 86140 C-REACTIVE PROTEIN: CPT

## 2024-04-28 RX ORDER — ACETAMINOPHEN 325 MG/1
650 TABLET ORAL EVERY 6 HOURS PRN
Status: DISCONTINUED | OUTPATIENT
Start: 2024-05-03 | End: 2024-04-30

## 2024-04-28 RX ORDER — LABETALOL HYDROCHLORIDE 5 MG/ML
10 INJECTION, SOLUTION INTRAVENOUS EVERY 4 HOURS PRN
Status: DISCONTINUED | OUTPATIENT
Start: 2024-04-28 | End: 2024-04-30

## 2024-04-28 RX ORDER — POTASSIUM CHLORIDE 7.45 MG/ML
10 INJECTION INTRAVENOUS ONCE
Status: COMPLETED | OUTPATIENT
Start: 2024-04-28 | End: 2024-04-28

## 2024-04-28 RX ORDER — OXYCODONE HYDROCHLORIDE 10 MG/1
10 TABLET ORAL
Status: DISCONTINUED | OUTPATIENT
Start: 2024-04-28 | End: 2024-04-30

## 2024-04-28 RX ORDER — HYDROMORPHONE HYDROCHLORIDE 1 MG/ML
0.5 INJECTION, SOLUTION INTRAMUSCULAR; INTRAVENOUS; SUBCUTANEOUS
Status: DISCONTINUED | OUTPATIENT
Start: 2024-04-28 | End: 2024-05-01 | Stop reason: HOSPADM

## 2024-04-28 RX ORDER — ONDANSETRON 4 MG/1
4 TABLET, ORALLY DISINTEGRATING ORAL EVERY 4 HOURS PRN
Status: DISCONTINUED | OUTPATIENT
Start: 2024-04-28 | End: 2024-05-01 | Stop reason: HOSPADM

## 2024-04-28 RX ORDER — PANTOPRAZOLE SODIUM 40 MG/10ML
40 INJECTION, POWDER, LYOPHILIZED, FOR SOLUTION INTRAVENOUS 2 TIMES DAILY
Status: COMPLETED | OUTPATIENT
Start: 2024-04-28 | End: 2024-04-29

## 2024-04-28 RX ORDER — WARFARIN SODIUM 2.5 MG/1
1.25-2.5 TABLET ORAL DAILY
Status: ON HOLD | COMMUNITY
End: 2024-05-01

## 2024-04-28 RX ORDER — OXYCODONE HYDROCHLORIDE 5 MG/1
5 TABLET ORAL EVERY 4 HOURS PRN
Status: ON HOLD | COMMUNITY
End: 2024-05-01

## 2024-04-28 RX ORDER — SODIUM CHLORIDE AND POTASSIUM CHLORIDE 300; 900 MG/100ML; MG/100ML
INJECTION, SOLUTION INTRAVENOUS CONTINUOUS
Status: DISCONTINUED | OUTPATIENT
Start: 2024-04-28 | End: 2024-04-30

## 2024-04-28 RX ORDER — IPRATROPIUM BROMIDE AND ALBUTEROL SULFATE 2.5; .5 MG/3ML; MG/3ML
3 SOLUTION RESPIRATORY (INHALATION)
Status: DISCONTINUED | OUTPATIENT
Start: 2024-04-28 | End: 2024-05-01 | Stop reason: HOSPADM

## 2024-04-28 RX ORDER — SODIUM CHLORIDE 9 MG/ML
1000 INJECTION, SOLUTION INTRAVENOUS ONCE
Status: COMPLETED | OUTPATIENT
Start: 2024-04-28 | End: 2024-04-28

## 2024-04-28 RX ORDER — POTASSIUM CHLORIDE 20 MEQ/1
40 TABLET, EXTENDED RELEASE ORAL ONCE
Status: COMPLETED | OUTPATIENT
Start: 2024-04-28 | End: 2024-04-28

## 2024-04-28 RX ORDER — OXYCODONE HYDROCHLORIDE 5 MG/1
5 TABLET ORAL
Status: DISCONTINUED | OUTPATIENT
Start: 2024-04-28 | End: 2024-04-30

## 2024-04-28 RX ORDER — KETOROLAC TROMETHAMINE 15 MG/ML
15 INJECTION, SOLUTION INTRAMUSCULAR; INTRAVENOUS EVERY 6 HOURS
Status: DISCONTINUED | OUTPATIENT
Start: 2024-04-28 | End: 2024-04-28

## 2024-04-28 RX ORDER — ATORVASTATIN CALCIUM 40 MG/1
40 TABLET, FILM COATED ORAL EVERY EVENING
Status: DISCONTINUED | OUTPATIENT
Start: 2024-04-28 | End: 2024-05-01 | Stop reason: HOSPADM

## 2024-04-28 RX ORDER — MAGNESIUM SULFATE HEPTAHYDRATE 40 MG/ML
2 INJECTION, SOLUTION INTRAVENOUS ONCE
Status: COMPLETED | OUTPATIENT
Start: 2024-04-28 | End: 2024-04-28

## 2024-04-28 RX ORDER — ONDANSETRON 2 MG/ML
4 INJECTION INTRAMUSCULAR; INTRAVENOUS EVERY 4 HOURS PRN
Status: DISCONTINUED | OUTPATIENT
Start: 2024-04-28 | End: 2024-05-01 | Stop reason: HOSPADM

## 2024-04-28 RX ORDER — ACETAMINOPHEN 325 MG/1
650 TABLET ORAL EVERY 6 HOURS
Status: DISCONTINUED | OUTPATIENT
Start: 2024-04-28 | End: 2024-04-30

## 2024-04-28 RX ORDER — MORPHINE SULFATE 4 MG/ML
4 INJECTION INTRAVENOUS ONCE
Status: COMPLETED | OUTPATIENT
Start: 2024-04-28 | End: 2024-04-28

## 2024-04-28 RX ORDER — ONDANSETRON 2 MG/ML
4 INJECTION INTRAMUSCULAR; INTRAVENOUS ONCE
Status: COMPLETED | OUTPATIENT
Start: 2024-04-28 | End: 2024-04-28

## 2024-04-28 RX ORDER — SODIUM CHLORIDE, SODIUM LACTATE, POTASSIUM CHLORIDE, AND CALCIUM CHLORIDE .6; .31; .03; .02 G/100ML; G/100ML; G/100ML; G/100ML
500 INJECTION, SOLUTION INTRAVENOUS
Status: DISCONTINUED | OUTPATIENT
Start: 2024-04-28 | End: 2024-05-01 | Stop reason: HOSPADM

## 2024-04-28 RX ORDER — POLYETHYLENE GLYCOL 3350 17 G/17G
1 POWDER, FOR SOLUTION ORAL
Status: DISCONTINUED | OUTPATIENT
Start: 2024-04-28 | End: 2024-05-01 | Stop reason: HOSPADM

## 2024-04-28 RX ORDER — ACETAMINOPHEN 325 MG/1
650 TABLET ORAL EVERY 6 HOURS PRN
Status: DISCONTINUED | OUTPATIENT
Start: 2024-04-28 | End: 2024-04-28

## 2024-04-28 RX ORDER — AMOXICILLIN 250 MG
2 CAPSULE ORAL EVERY EVENING
Status: DISCONTINUED | OUTPATIENT
Start: 2024-04-28 | End: 2024-05-01 | Stop reason: HOSPADM

## 2024-04-28 RX ADMIN — POTASSIUM CHLORIDE 40 MEQ: 1500 TABLET, EXTENDED RELEASE ORAL at 10:16

## 2024-04-28 RX ADMIN — MORPHINE SULFATE 4 MG: 4 INJECTION INTRAVENOUS at 09:18

## 2024-04-28 RX ADMIN — ACETAMINOPHEN 650 MG: 325 TABLET, FILM COATED ORAL at 18:39

## 2024-04-28 RX ADMIN — MAGNESIUM SULFATE HEPTAHYDRATE 2 G: 2 INJECTION, SOLUTION INTRAVENOUS at 12:19

## 2024-04-28 RX ADMIN — POTASSIUM CHLORIDE AND SODIUM CHLORIDE: 900; 300 INJECTION, SOLUTION INTRAVENOUS at 11:30

## 2024-04-28 RX ADMIN — ACETAMINOPHEN 650 MG: 325 TABLET, FILM COATED ORAL at 12:19

## 2024-04-28 RX ADMIN — DOCUSATE SODIUM 50 MG AND SENNOSIDES 8.6 MG 2 TABLET: 8.6; 5 TABLET, FILM COATED ORAL at 18:39

## 2024-04-28 RX ADMIN — MORPHINE SULFATE 4 MG: 4 INJECTION INTRAVENOUS at 10:20

## 2024-04-28 RX ADMIN — POTASSIUM CHLORIDE 40 MEQ: 1500 TABLET, EXTENDED RELEASE ORAL at 18:39

## 2024-04-28 RX ADMIN — POTASSIUM CHLORIDE AND SODIUM CHLORIDE: 900; 300 INJECTION, SOLUTION INTRAVENOUS at 20:55

## 2024-04-28 RX ADMIN — SODIUM CHLORIDE 1000 ML: 9 INJECTION, SOLUTION INTRAVENOUS at 09:17

## 2024-04-28 RX ADMIN — POTASSIUM CHLORIDE 10 MEQ: 7.46 INJECTION, SOLUTION INTRAVENOUS at 10:20

## 2024-04-28 RX ADMIN — ATORVASTATIN CALCIUM 40 MG: 40 TABLET, FILM COATED ORAL at 18:39

## 2024-04-28 RX ADMIN — ONDANSETRON 4 MG: 2 INJECTION INTRAMUSCULAR; INTRAVENOUS at 09:17

## 2024-04-28 RX ADMIN — ACETAMINOPHEN 650 MG: 325 TABLET, FILM COATED ORAL at 23:33

## 2024-04-28 RX ADMIN — POTASSIUM CHLORIDE AND SODIUM CHLORIDE: 900; 300 INJECTION, SOLUTION INTRAVENOUS at 18:50

## 2024-04-28 RX ADMIN — PANTOPRAZOLE SODIUM 40 MG: 40 INJECTION, POWDER, FOR SOLUTION INTRAVENOUS at 18:39

## 2024-04-28 ASSESSMENT — COGNITIVE AND FUNCTIONAL STATUS - GENERAL
CLIMB 3 TO 5 STEPS WITH RAILING: TOTAL
DRESSING REGULAR UPPER BODY CLOTHING: TOTAL
SUGGESTED CMS G CODE MODIFIER MOBILITY: CM
MOVING TO AND FROM BED TO CHAIR: TOTAL
PERSONAL GROOMING: A LOT
EATING MEALS: A LOT
MOBILITY SCORE: 8
SUGGESTED CMS G CODE MODIFIER DAILY ACTIVITY: CL
MOVING FROM LYING ON BACK TO SITTING ON SIDE OF FLAT BED: A LOT
STANDING UP FROM CHAIR USING ARMS: TOTAL
WALKING IN HOSPITAL ROOM: TOTAL
DRESSING REGULAR LOWER BODY CLOTHING: A LOT
DAILY ACTIVITIY SCORE: 11
TOILETING: A LOT
HELP NEEDED FOR BATHING: A LOT
TURNING FROM BACK TO SIDE WHILE IN FLAT BAD: A LOT

## 2024-04-28 ASSESSMENT — ENCOUNTER SYMPTOMS
COUGH: 0
SPEECH CHANGE: 1
HEADACHES: 0
BRUISES/BLEEDS EASILY: 0
PALPITATIONS: 0
FOCAL WEAKNESS: 1
ROS GI COMMENTS: RECTAL PAIN
CHILLS: 0
NAUSEA: 1
WEAKNESS: 1
FEVER: 0
DIZZINESS: 0
DOUBLE VISION: 0
VOMITING: 0
SHORTNESS OF BREATH: 0
HEARTBURN: 0
BLURRED VISION: 0
ABDOMINAL PAIN: 0
DEPRESSION: 0
FALLS: 1
MYALGIAS: 1

## 2024-04-28 ASSESSMENT — PAIN DESCRIPTION - PAIN TYPE
TYPE: ACUTE PAIN;SURGICAL PAIN
TYPE: SURGICAL PAIN;ACUTE PAIN
TYPE: ACUTE PAIN

## 2024-04-28 ASSESSMENT — FIBROSIS 4 INDEX: FIB4 SCORE: 2.54

## 2024-04-28 ASSESSMENT — LIFESTYLE VARIABLES: SUBSTANCE_ABUSE: 0

## 2024-04-28 NOTE — ED NOTES
This RN rounded on pt once more. Noted that pt did not eat lunch, asked pt if would like crackers or sandwich or lean cuisine. Pt states no. Not hungry  right now.   Pain reassessed. Pt states pain continues to be at acceptable level.   Water provided at bedside.   Friend at bedside also offered refreshment however denies want.

## 2024-04-28 NOTE — PROGRESS NOTES
Inpatient Anticoagulation Service Note for 4/28/2024      Reason for Anticoagulation: Stroke, Deep Vein Thrombosis           Hemoglobin Value: (!) 11.8  Hematocrit Value: (!) 31.9  Lab Platelet Value: (!) 150  Target INR: 2.0 to 3.0    INR from last 7 days       Date/Time INR Value    04/28/24 0832 7.67          Dose from last 7 days       Date/Time Dose (mg)    04/28/24 1536 0          Significant Interactions: Not Applicable  Bridge Therapy: No     Reversal Agent Administered: Not Applicable    Plan: INR elevated. Hold warfarin. Per discussion with provider given no signs/symptoms of bleeding not giving vitamin K at this time. INR in AM     Education Material Provided?: No      Pharmacist suggested discharge dosing: to be determined closer to discharge     Beth Clifton, PharmD

## 2024-04-28 NOTE — ED PROVIDER NOTES
ED Provider Note    CHIEF COMPLAINT  Chief Complaint   Patient presents with    Abdominal Pain     X 1 month  Lower abdominals both sides  Pt denies urinary changes but endorses BM 'changes when eating different foods'    T-5000 GLF     Pt bib ems from home sp glf while transferring from toilet to wheelchair 3 hours ago  + thinners  -Headstrike  -LOC     EXTERNAL RECORDS REVIEWED  The patient was seen here two days ago with rectal pain, and was given pain medication and discharged. He has a history of rectal cancer and is recieving radiation treatment at this time. The patient was seen 4/21 also for rectal pain and had labs and CT imaging completed at that time, with no significant abnormalities. The patient has a history of a prior stroke with right sided paralysis and cognitive deficits.     HPI/ROS  LIMITATION TO HISTORY   Select: Altered mental status / Confusion  OUTSIDE HISTORIAN(S):  None    Drew Melton is a 68 y.o. male who presents to the Emergency Department with abdominal pain onset one month ago. The patient states he has felt this pain since eating popcorn, and explains his abdominal pain to be located on both sides. He is unsure of his last bowel movement, and states he believes his urination has been normal. The patient denies any episodes of vomiting. He states he has felt right sided numbness for a long time due to history of stroke. The patient's caregiver states that the patient does not take his stool softener medication, however does take his pain medication regularly.     The patient also has a reported ground level fall as of 3 hours ago, and is noted to be on blood thinners. The patient states he did not hit his head during this incident. His caregiver states he was at work when the patient fell, but the patient was transferring from the toilet to his wheelchair at the time of the fall.    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     [facility]    Stroke (HCC) 09/01/2001    Right sided paralysis    Unspecified late effects of cerebrovascular disease     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        SURGICAL HISTORY  Past Surgical History:   Procedure Laterality Date    AL EXCIS RECTAL TUMOR, TRANSANAL, PARTIAL TH* N/A 2/6/2024    Procedure: INCISIONAL BIOPSY OF PERIANAL MASS;  Surgeon: Brigido Garcia MD, PhD;  Location: SURGERY [facility];  Service: General    COLONOSCOPY  1/24/2018    Procedure: COLONOSCOPY;  Surgeon: Lenin Danielle M.D.;  Location: SURGERY [facility];  Service: Gastroenterology    AMPUTATION, BELOW THE KNEE Right 2001    OTHER      cataract IOLI        FAMILY HISTORY  Family History   Problem Relation Age of Onset    No Known Problems Mother     No Known Problems Father     No Known Problems Maternal Grandmother     No Known Problems Maternal Grandfather     No Known Problems Paternal Grandmother     No Known Problems Paternal Grandfather     Stroke Neg Hx     Diabetes Neg Hx     Cancer Neg Hx     Heart Disease Neg Hx     Hypertension Neg Hx     Hyperlipidemia Neg Hx        SOCIAL HISTORY   reports that he quit smoking about 22 years ago. His smoking use included cigarettes. He has never used smokeless tobacco. He reports that he does not drink alcohol and does not use drugs.    CURRENT MEDICATIONS  Previous Medications    CAPECITABINE (XELODA) 500 MG TABLET    Take 1,000 mg by mouth 2 times a day. 2 tablets (1000 mg), twice daily    DOCUSATE SODIUM (COLACE) 100 MG CAP    Take 1 Capsule by mouth 2 times a day.    OXYCODONE IMMEDIATE-RELEASE (ROXICODONE) 5 MG TAB    Take 5 mg  by mouth every four hours as needed for Severe Pain.    WARFARIN (COUMADIN) 2.5 MG TAB    Take 1.25-2.5 mg by mouth every day. Pt takes 1.25 mg on odd days and 2.5 mg on even days       ALLERGIES  Patient has no known allergies.    PHYSICAL EXAM  BP (!) 141/91   Pulse (!) 101   Temp 36.7 °C (98 °F) (Temporal)   Resp 16   Ht 1.829 m (6')   Wt 68 kg (150 lb)   SpO2 97%    Constitutional: Nontoxic appearing. Alert in no apparent distress.  HENT: Normocephalic, Atraumatic. Bilateral external ears normal. Nose normal.  Moist mucous membranes.  Oropharynx clear.  Eyes: Pupils are equal and reactive. Conjunctiva normal.   Neck: Supple, full range of motion  Heart: Regular rate and rhythm.  No murmurs.    Lungs: No respiratory distress, normal work of breathing. Lungs clear to auscultation bilaterally.  Abdomen Soft, no distention.  No tenderness to palpation.  Musculoskeletal: Atraumatic. No obvious deformities noted.  No lower extremity edema.  Skin: Warm, Dry.  No erythema, No rash.   Rectal: Scattered skin breakdown around the rectum with surrounding erythema. No fluctuance or induration. Scant yellow drainage present.   Neurologic: Alert and oriented x3. Moving all extremities spontaneously without focal deficits.  Psychiatric: Affect normal, Mood normal, Appears appropriate and not intoxicated.    DIAGNOSTIC STUDIES / PROCEDURES    LABS  Labs Reviewed   CBC WITH DIFFERENTIAL - Abnormal; Notable for the following components:       Result Value    WBC 4.4 (*)     RBC 3.49 (*)     Hemoglobin 11.8 (*)     Hematocrit 31.9 (*)     MCH 33.8 (*)     MCHC 37.0 (*)     RDW 61.2 (*)     Platelet Count 150 (*)     Neutrophils-Polys 78.70 (*)     Lymphocytes 8.10 (*)     Lymphs (Absolute) 0.36 (*)     All other components within normal limits   COMP METABOLIC PANEL - Abnormal; Notable for the following components:    Potassium 2.7 (*)     Glucose 105 (*)     Total Bilirubin 2.4 (*)     All other components within normal  limits   LIPASE   ESTIMATED GFR   URINALYSIS   MAGNESIUM     RADIOLOGY  I have independently interpreted the diagnostic imaging associated with this visit and am waiting the final reading from the radiologist.   My preliminary interpretation is as follows: no bowel obstruction    Radiologist interpretation:  [id number] VIEW   Final Result      No acute process.          COURSE & MEDICAL DECISION MAKING    8:36 AM - Patient seen and examined at bedside. Discussed plan of care, including diagnostic workup. Patient agrees to the plan of care. The patient will be medicated with Zofran injection 4 mg, morphine injection 4 mg and NS infusion 1000 mL. Ordered for DX- abdomen, CBC w/ differential, CMP, Lipase, UA to evaluate his symptoms.     9:20 AM- Patient was reevaluated at bedside. Discussed radiology results with the patient and his caregiver. Rectal exam performed by myself and nursing staff at this time. Additional history obtained at this time from the caregiver and noted in the HPI.     9:54 AM- Patient was reevaluated at bedside. The patient's symptoms have not subsided at this time. Paged Oncology.         ASSESSMENT, COURSE AND PLAN  Care Narrative: Patient with history of rectal cancer currently receiving radiation who presents with ongoing rectal pain.  He has been seen here twice in the last few weeks for similar complaints.  He is afebrile with normal vital signs on arrival.  I did consider further imaging with CT however his abdominal exam is overall benign and he was recently imaged last week and normal.  His rectal exam does show some skin breakdown related to the radiation treatment.  There is no obvious sign of infection or abscess.  Labs do not show leukocytosis concerning for infectious process.  He does have significantly low potassium at 2.7.  No renal dysfunction or other electrolyte abnormality.  Abdominal x-ray does not show signs of bowel obstruction or perforation.  Plan to admit for pain  control and electrolyte repletion.      10:22 AM- I discussed the patient's case and the above findings with Dr. Mccurdy (Oncology) who is aware the patient is here and is recommending palliative care consult at this time.     10:38 AM- I discussed the patient's case and the above findings with Dr. Yoon (Hospitalist) who agrees to see the patient or care.       ADDITIONAL PROBLEM LIST  Problem #1: Rectal pain -related to radiation, admit for pain control and palliative care consultation    Problem #2: Hypokalemia -initiation replete      DISPOSITION AND DISCUSSIONS  I have discussed management of the patient with the following physicians and YESIKA's:  Dr. Yoon (Hospitalist) Dr. Mccurdy (Oncology)      Escalation of care considered, and ultimately not performed:diagnostic imaging        DISPOSITION:  Patient will be hospitalized by Dr. Yoon in guarded condition.    FINAL DIAGNOSIS  1. Rectal pain    2. Hypokalemia      The note accurately reflects work and decisions made by me.  Kelley Hanson M.D.  4/28/2024  4:39 PM     Bernice MONROE (Thiago), am scribing for, and in the presence of, Kelley Hanson M.D..    Electronically signed by: Bernice Nance (Thiago), 4/28/2024    Kelley MONROE M.D. personally performed the services described in this documentation, as scribed by Bernice Nance in my presence, and it is both accurate and complete.

## 2024-04-28 NOTE — ED NOTES
This Rn rounded on Pt. States doing ok. Friend continues to be at bedside. Denies additional needs at this time.

## 2024-04-28 NOTE — ASSESSMENT & PLAN NOTE
Resolved  Due to inadequate PO intake  Mg normal  No further testing for planned hospice enrollment

## 2024-04-28 NOTE — ED NOTES
Pt swallowed potassium pills without issues once pills broken in half. IV potassium running along with bolus.   Pt placed 2 L NC after second dose of Morphine.

## 2024-04-28 NOTE — ED NOTES
MD at bedside now. Pt states pain much improved with second dose of morphine.  Vitals stable on 2L NC.   This RN helped pt removed pants and change into hospital underwear.

## 2024-04-28 NOTE — ED TRIAGE NOTES
Chief Complaint   Patient presents with    Abdominal Pain     X 1 month  Lower abdominals both sides  Pt denies urinary changes but endorses BM 'changes when eating different foods'    T-5000 GLF     Pt bib ems from home sp glf while transferring from toilet to wheelchair 3 hours ago  + thinners  -Headstrike  -LOC     Pt also endorses rectal pain from recent CA excision 2/2024

## 2024-04-28 NOTE — ED NOTES
Lab called with critical potassium, assist RN notified ERP, new orders placed. Will administer once received pharmacy verification.   Lab called to process add on magnesium.

## 2024-04-28 NOTE — ED NOTES
Pt moved to [facility] and back to [facility]. Felt gassy. Did not need to urinate or have BM so unable to collect sample.     Pillow placed under pt's bottom to provide additional cushion to wound. First bag of potassium complete. Second bag of  Potassium running however hard stop on upper limit of Kcl for 100ml/hour. Running at that rate.    Pt states pain at acceptable level at this time.   Friend continues to be at bedside.

## 2024-04-28 NOTE — ASSESSMENT & PLAN NOTE
Resolved s/p phytonadione  Likely due to inadequate PO intake  Continue warfarin for now pending hospice enrollment

## 2024-04-28 NOTE — ED NOTES
Pt on call  light. States wants to try going to [facility] once more.   This Rn got pt up however unable to pass BM due to discomfort. Urinal at bedside.   Pt denies additional needs.

## 2024-04-28 NOTE — H&P
Hospital Medicine History & Physical Note    Date of Service  4/28/2024    Primary Care Physician  MARCELO Albright.    Consultants  Oncology (Dr. Mccurdy)  Palliative care    Code Status  Full Code    Chief Complaint  Chief Complaint   Patient presents with    Abdominal Pain     X 1 month  Lower abdominals both sides  Pt denies urinary changes but endorses BM 'changes when eating different foods'    T-5000 GLF     Pt bib ems from home sp glf while transferring from toilet to wheelchair 3 hours ago  + thinners  -Headstrike  -LOC       History of Presenting Illness  Drew Melton is a 68 y.o. male with history of recent diagnosis of rectal squamous cell carcinoma on radiation therapy, history of CVA with residual dysarthria and right-sided weakness on warfarin, PVD s/p right BKA who presented 4/28/2024 with evaluation for severe rectal pain.  Patient reported severe pain in rectum, as well as lower abdomen, reported a ground-level fall.  Admits to poor oral intake.  In ER, noted to have potassium of 2.7, no acute findings on KUB.  Medical oncology has been consulted, recommended palliative care evaluation, formal evaluation to follow.  Palliative care consulted.  Admission requested by ERP.  Therefore, admitted to medicine service for further evaluation and treatment.    I discussed the plan of care with patient, family, bedside RN, and pharmacy.    Review of Systems  Review of Systems   Constitutional:  Negative for chills and fever.   HENT:  Negative for hearing loss and tinnitus.    Eyes:  Negative for blurred vision and double vision.   Respiratory:  Negative for cough and shortness of breath.    Cardiovascular:  Negative for chest pain and palpitations.   Gastrointestinal:  Positive for nausea. Negative for abdominal pain, heartburn and vomiting.        Rectal pain   Genitourinary:  Negative for dysuria and urgency.   Musculoskeletal:  Positive for falls, joint pain and myalgias.   Skin:   Negative for itching and rash.   Neurological:  Positive for speech change, focal weakness (right) and weakness. Negative for dizziness and headaches.   Endo/Heme/Allergies:  Negative for environmental allergies. Does not bruise/bleed easily.   Psychiatric/Behavioral:  Negative for depression and substance abuse.    All other systems reviewed and are negative.      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    Surgical History   has a past surgical history that includes amputation, below the knee (Right, 2001); other; colonoscopy (1/24/2018); and pr excis rectal tumor, transanal, partial th* (N/A, 2/6/2024).     Family History  family history includes No Known Problems in his father, maternal grandfather, maternal grandmother, mother, paternal grandfather, and paternal grandmother.   Family history reviewed with patient. There is family history that is pertinent to the chief complaint.     Social History   reports that he quit smoking about 22 years ago. His smoking use included cigarettes. He has never used smokeless tobacco. He reports that he does not drink alcohol and does not use drugs.    Allergies  No Known Allergies    Medications  Prior to Admission Medications   Prescriptions Last Dose Informant Patient Reported? Taking?   capecitabine (XELODA) 500 MG tablet 4/27/2024 at PM Family Member, Patient Yes Yes   Sig: Take 1,000 mg by mouth 2 times a day. 2 tablets (1000 mg), twice daily   docusate sodium (COLACE) 100 MG Cap 4/27/2024 at PM Family Member, Patient No Yes   Sig: Take 1 Capsule by  mouth 2 times a day.   oxyCODONE immediate-release (ROXICODONE) 5 MG Tab 4/28/2024 at 0200 Family Member, Patient Yes Yes   Sig: Take 5 mg by mouth every four hours as needed for Severe Pain.   warfarin (COUMADIN) 2.5 MG Tab 4/27/2024 at PM Family Member, Patient Yes Yes   Sig: Take 1.25-2.5 mg by mouth every day. Pt takes 1.25 mg on odd days and 2.5 mg on even days      Facility-Administered Medications: None       Physical Exam  Temp:  [36.7 °C (98 °F)] 36.7 °C (98 °F)  Pulse:  [] 79  Resp:  [16] 16  BP: ()/(53-91) 118/74  SpO2:  [94 %-100 %] 96 %  Blood Pressure : 114/86   Temperature: 36.7 °C (98 °F)   Pulse: 97   Respiration: 16   Pulse Oximetry: 94 %       Physical Exam  Vitals and nursing note reviewed.   Constitutional:       Appearance: He is ill-appearing.   HENT:      Head: Normocephalic and atraumatic.      Nose: Nose normal.      Mouth/Throat:      Mouth: Mucous membranes are dry.      Pharynx: Oropharynx is clear.   Eyes:      General: No scleral icterus.     Extraocular Movements: Extraocular movements intact.   Cardiovascular:      Rate and Rhythm: Normal rate and regular rhythm.      Pulses: Normal pulses.      Heart sounds:      No friction rub.   Pulmonary:      Effort: No respiratory distress.      Breath sounds: No wheezing or rales.   Chest:      Chest wall: No tenderness.   Abdominal:      General: There is no distension.      Tenderness: There is no abdominal tenderness. There is no guarding or rebound.   Musculoskeletal:         General: No tenderness.      Cervical back: Neck supple. No tenderness.      Comments: S/p right BKA   Skin:     Capillary Refill: Capillary refill takes less than 2 seconds.   Neurological:      Mental Status: He is alert and oriented to person, place, and time.      Comments: Chronic dysarthria  Right-sided hemiparesis from CVA   Psychiatric:         Mood and Affect: Mood normal.         Laboratory:  Recent Labs     04/28/24  0832   WBC 4.4*   RBC 3.49*  "  HEMOGLOBIN 11.8*   HEMATOCRIT 31.9*   MCV 91.4   MCH 33.8*   MCHC 37.0*   RDW 61.2*   PLATELETCT 150*   MPV 9.4     Recent Labs     04/28/24  0832   SODIUM 144   POTASSIUM 2.7*   CHLORIDE 106   CO2 23   GLUCOSE 105*   BUN 22   CREATININE 0.65   CALCIUM 9.5     Recent Labs     04/28/24  0832   ALTSGPT 43   ASTSGOT 31   ALKPHOSPHAT 70   TBILIRUBIN 2.4*   LIPASE 36   GLUCOSE 105*     Recent Labs     04/28/24  0832   INR 7.67*     No results for input(s): \"NTPROBNP\" in the last 72 hours.      No results for input(s): \"TROPONINT\" in the last 72 hours.    Imaging:  [id number] VIEW   Final Result      No acute process.          X-Ray:  I have personally reviewed the images and compared with prior images.    Assessment/Plan:  Justification for Admission Status  I anticipate this patient will require at least 2 midnights of hospitalization, therefore appropriate for inpatient status.      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  He stated he is agreeable for further diagnostic workup and treatment of cancer-including radiation therapy, chemotherapy, surgical intervention if needed.  When asked if he is agreeable for full CODE STATUS-he stated he would like time to think over, in the meantime agreeable for CPR/defibrillation/intubation and or mechanical ventilation if needed.  Diagnosis, prognosis, question and concern addressed.  Full CODE STATUS confirmed.  ACP: 16 minutes        VTE prophylaxis: therapeutic anticoagulation with warfarin, dosing as per pharmacy,  -- supratherapeutic INR

## 2024-04-28 NOTE — ED NOTES
Med Rec complete per pt and pt's family  Allergies Reviewed    Pt takes WARFARIN, last dose 4/27 PM

## 2024-04-28 NOTE — ED NOTES
Lab called at 1217 to report critical values  PT 66.2  INR 7.67    MD Sands messaged with critical result at 1223. Replied immediately asking if pt has any bleeding concerns. No bleeding noted at this time  from any orifice.   However this RN recommends wound consult for wound to anus as when evaluated earlier yellow looking discharge noted and redness around site. As wound is within folds of anus, it's a very wet area and pt reports has been using prescribed cream as usual.   MD confirms will order wound consult for evaluation while pt admitted.

## 2024-04-28 NOTE — ASSESSMENT & PLAN NOTE
Due to inadequate PO intake due to food/drink aversion  Continue IVF for supportive care pending hospice enrollment

## 2024-04-28 NOTE — ED NOTES
Report called to [facility] RN.   Expressed concerns for additional potassium without CBC repeat scheduled as pt has received 40mEQ orally + 10mEq IV + almost done with additional bag of 40mEq' continuous fluids.   Wound care supplies sent up with patient during transport. All belongings taken with pt upon leaving ED.
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
## 2024-04-29 LAB
ALBUMIN SERPL BCP-MCNC: 3.3 G/DL (ref 3.2–4.9)
ANION GAP SERPL CALC-SCNC: 8 MMOL/L (ref 7–16)
ANISOCYTOSIS BLD QL SMEAR: ABNORMAL
APPEARANCE UR: CLEAR
BASOPHILS # BLD AUTO: 0 % (ref 0–1.8)
BASOPHILS # BLD: 0 K/UL (ref 0–0.12)
BILIRUB UR QL STRIP.AUTO: NEGATIVE
BUN SERPL-MCNC: 15 MG/DL (ref 8–22)
CALCIUM ALBUM COR SERPL-MCNC: 8.4 MG/DL (ref 8.5–10.5)
CALCIUM SERPL-MCNC: 7.8 MG/DL (ref 8.5–10.5)
CHLORIDE SERPL-SCNC: 114 MMOL/L (ref 96–112)
CO2 SERPL-SCNC: 21 MMOL/L (ref 20–33)
COLOR UR: YELLOW
CREAT SERPL-MCNC: 0.52 MG/DL (ref 0.5–1.4)
EOSINOPHIL # BLD AUTO: 0.26 K/UL (ref 0–0.51)
EOSINOPHIL NFR BLD: 6.9 % (ref 0–6.9)
ERYTHROCYTE [DISTWIDTH] IN BLOOD BY AUTOMATED COUNT: 65.3 FL (ref 35.9–50)
GFR SERPLBLD CREATININE-BSD FMLA CKD-EPI: 110 ML/MIN/1.73 M 2
GLUCOSE SERPL-MCNC: 104 MG/DL (ref 65–99)
GLUCOSE UR STRIP.AUTO-MCNC: NEGATIVE MG/DL
HCT VFR BLD AUTO: 27.3 % (ref 42–52)
HGB BLD-MCNC: 9.3 G/DL (ref 14–18)
INR PPP: >=10 (ref 0.87–1.13)
KETONES UR STRIP.AUTO-MCNC: ABNORMAL MG/DL
LEUKOCYTE ESTERASE UR QL STRIP.AUTO: NEGATIVE
LYMPHOCYTES # BLD AUTO: 0.23 K/UL (ref 1–4.8)
LYMPHOCYTES NFR BLD: 6.1 % (ref 22–41)
MAGNESIUM SERPL-MCNC: 2 MG/DL (ref 1.5–2.5)
MANUAL DIFF BLD: NORMAL
MCH RBC QN AUTO: 33.3 PG (ref 27–33)
MCHC RBC AUTO-ENTMCNC: 34.1 G/DL (ref 32.3–36.5)
MCV RBC AUTO: 97.8 FL (ref 81.4–97.8)
MICRO URNS: ABNORMAL
MICROCYTES BLD QL SMEAR: ABNORMAL
MONOCYTES # BLD AUTO: 0.23 K/UL (ref 0–0.85)
MONOCYTES NFR BLD AUTO: 6.1 % (ref 0–13.4)
MORPHOLOGY BLD-IMP: NORMAL
NEUTROPHILS # BLD AUTO: 2.99 K/UL (ref 1.82–7.42)
NEUTROPHILS NFR BLD: 80.9 % (ref 44–72)
NITRITE UR QL STRIP.AUTO: NEGATIVE
NRBC # BLD AUTO: 0 K/UL
NRBC BLD-RTO: 0 /100 WBC (ref 0–0.2)
PH UR STRIP.AUTO: 5 [PH] (ref 5–8)
PHOSPHATE SERPL-MCNC: 1.3 MG/DL (ref 2.5–4.5)
PLATELET # BLD AUTO: 127 K/UL (ref 164–446)
PLATELET BLD QL SMEAR: NORMAL
PMV BLD AUTO: 9.6 FL (ref 9–12.9)
POTASSIUM SERPL-SCNC: 4.4 MMOL/L (ref 3.6–5.5)
PROT UR QL STRIP: NEGATIVE MG/DL
PROTHROMBIN TIME: 92.5 SEC (ref 12–14.6)
RBC # BLD AUTO: 2.79 M/UL (ref 4.7–6.1)
RBC BLD AUTO: PRESENT
RBC UR QL AUTO: NEGATIVE
SODIUM SERPL-SCNC: 143 MMOL/L (ref 135–145)
SP GR UR STRIP.AUTO: 1.02
UROBILINOGEN UR STRIP.AUTO-MCNC: 0.2 MG/DL
WBC # BLD AUTO: 3.7 K/UL (ref 4.8–10.8)

## 2024-04-29 PROCEDURE — 99497 ADVNCD CARE PLAN 30 MIN: CPT | Performed by: NURSE PRACTITIONER

## 2024-04-29 PROCEDURE — 99233 SBSQ HOSP IP/OBS HIGH 50: CPT | Performed by: INTERNAL MEDICINE

## 2024-04-29 PROCEDURE — 36415 COLL VENOUS BLD VENIPUNCTURE: CPT

## 2024-04-29 PROCEDURE — C9113 INJ PANTOPRAZOLE SODIUM, VIA: HCPCS | Performed by: STUDENT IN AN ORGANIZED HEALTH CARE EDUCATION/TRAINING PROGRAM

## 2024-04-29 PROCEDURE — 700102 HCHG RX REV CODE 250 W/ 637 OVERRIDE(OP): Performed by: NURSE PRACTITIONER

## 2024-04-29 PROCEDURE — 99223 1ST HOSP IP/OBS HIGH 75: CPT | Mod: 25 | Performed by: NURSE PRACTITIONER

## 2024-04-29 PROCEDURE — A9270 NON-COVERED ITEM OR SERVICE: HCPCS | Performed by: NURSE PRACTITIONER

## 2024-04-29 PROCEDURE — 700101 HCHG RX REV CODE 250: Performed by: STUDENT IN AN ORGANIZED HEALTH CARE EDUCATION/TRAINING PROGRAM

## 2024-04-29 PROCEDURE — 700111 HCHG RX REV CODE 636 W/ 250 OVERRIDE (IP): Performed by: STUDENT IN AN ORGANIZED HEALTH CARE EDUCATION/TRAINING PROGRAM

## 2024-04-29 PROCEDURE — A9270 NON-COVERED ITEM OR SERVICE: HCPCS | Performed by: STUDENT IN AN ORGANIZED HEALTH CARE EDUCATION/TRAINING PROGRAM

## 2024-04-29 PROCEDURE — 700102 HCHG RX REV CODE 250 W/ 637 OVERRIDE(OP): Performed by: STUDENT IN AN ORGANIZED HEALTH CARE EDUCATION/TRAINING PROGRAM

## 2024-04-29 PROCEDURE — 85007 BL SMEAR W/DIFF WBC COUNT: CPT

## 2024-04-29 PROCEDURE — 85027 COMPLETE CBC AUTOMATED: CPT

## 2024-04-29 PROCEDURE — 85610 PROTHROMBIN TIME: CPT

## 2024-04-29 PROCEDURE — 81003 URINALYSIS AUTO W/O SCOPE: CPT

## 2024-04-29 PROCEDURE — 80069 RENAL FUNCTION PANEL: CPT

## 2024-04-29 PROCEDURE — 700102 HCHG RX REV CODE 250 W/ 637 OVERRIDE(OP)

## 2024-04-29 PROCEDURE — 83735 ASSAY OF MAGNESIUM: CPT

## 2024-04-29 PROCEDURE — A9270 NON-COVERED ITEM OR SERVICE: HCPCS

## 2024-04-29 PROCEDURE — 770001 HCHG ROOM/CARE - MED/SURG/GYN PRIV*

## 2024-04-29 RX ORDER — PHYTONADIONE 5 MG/1
5 TABLET ORAL ONCE
Status: COMPLETED | OUTPATIENT
Start: 2024-04-29 | End: 2024-04-29

## 2024-04-29 RX ORDER — DEXAMETHASONE 4 MG/1
4 TABLET ORAL ONCE
Status: COMPLETED | OUTPATIENT
Start: 2024-04-29 | End: 2024-04-29

## 2024-04-29 RX ORDER — GABAPENTIN 300 MG/1
300 CAPSULE ORAL NIGHTLY
Status: DISCONTINUED | OUTPATIENT
Start: 2024-04-29 | End: 2024-05-01 | Stop reason: HOSPADM

## 2024-04-29 RX ADMIN — PANTOPRAZOLE SODIUM 40 MG: 40 INJECTION, POWDER, FOR SOLUTION INTRAVENOUS at 04:22

## 2024-04-29 RX ADMIN — POTASSIUM CHLORIDE AND SODIUM CHLORIDE: 900; 300 INJECTION, SOLUTION INTRAVENOUS at 14:11

## 2024-04-29 RX ADMIN — ACETAMINOPHEN 650 MG: 325 TABLET, FILM COATED ORAL at 16:34

## 2024-04-29 RX ADMIN — PANTOPRAZOLE SODIUM 40 MG: 40 INJECTION, POWDER, FOR SOLUTION INTRAVENOUS at 16:34

## 2024-04-29 RX ADMIN — OXYCODONE HYDROCHLORIDE 10 MG: 10 TABLET ORAL at 04:22

## 2024-04-29 RX ADMIN — ACETAMINOPHEN 650 MG: 325 TABLET, FILM COATED ORAL at 13:34

## 2024-04-29 RX ADMIN — GABAPENTIN 300 MG: 300 CAPSULE ORAL at 21:31

## 2024-04-29 RX ADMIN — ATORVASTATIN CALCIUM 40 MG: 40 TABLET, FILM COATED ORAL at 16:34

## 2024-04-29 RX ADMIN — DEXAMETHASONE 4 MG: 4 TABLET ORAL at 13:34

## 2024-04-29 RX ADMIN — ACETAMINOPHEN 650 MG: 325 TABLET, FILM COATED ORAL at 04:22

## 2024-04-29 RX ADMIN — POTASSIUM CHLORIDE AND SODIUM CHLORIDE: 900; 300 INJECTION, SOLUTION INTRAVENOUS at 04:21

## 2024-04-29 RX ADMIN — PHYTONADIONE 5 MG: 5 TABLET ORAL at 05:13

## 2024-04-29 RX ADMIN — POTASSIUM CHLORIDE AND SODIUM CHLORIDE: 900; 300 INJECTION, SOLUTION INTRAVENOUS at 21:40

## 2024-04-29 ASSESSMENT — ENCOUNTER SYMPTOMS
FALLS: 0
COUGH: 0
DEPRESSION: 0
NERVOUS/ANXIOUS: 0
HALLUCINATIONS: 0
HEADACHES: 0
DOUBLE VISION: 0
ROS GI COMMENTS: ANAL PAIN
INSOMNIA: 0
CHILLS: 0
WEIGHT LOSS: 1
NAUSEA: 0
FEVER: 0
CONSTIPATION: 1
SHORTNESS OF BREATH: 0
SEIZURES: 0
BACK PAIN: 0
SORE THROAT: 0
BLURRED VISION: 0
PALPITATIONS: 0

## 2024-04-29 ASSESSMENT — LIFESTYLE VARIABLES
TOTAL SCORE: 0
HOW MANY TIMES IN THE PAST YEAR HAVE YOU HAD 5 OR MORE DRINKS IN A DAY: 0
ON A TYPICAL DAY WHEN YOU DRINK ALCOHOL HOW MANY DRINKS DO YOU HAVE: 0
TOTAL SCORE: 0
TOTAL SCORE: 0
ALCOHOL_USE: NO
EVER FELT BAD OR GUILTY ABOUT YOUR DRINKING: NO
EVER HAD A DRINK FIRST THING IN THE MORNING TO STEADY YOUR NERVES TO GET RID OF A HANGOVER: NO
HAVE PEOPLE ANNOYED YOU BY CRITICIZING YOUR DRINKING: NO
HAVE YOU EVER FELT YOU SHOULD CUT DOWN ON YOUR DRINKING: NO
SUBSTANCE_ABUSE: 0
CONSUMPTION TOTAL: NEGATIVE
AVERAGE NUMBER OF DAYS PER WEEK YOU HAVE A DRINK CONTAINING ALCOHOL: 0

## 2024-04-29 ASSESSMENT — PATIENT HEALTH QUESTIONNAIRE - PHQ9
2. FEELING DOWN, DEPRESSED, IRRITABLE, OR HOPELESS: NOT AT ALL
SUM OF ALL RESPONSES TO PHQ9 QUESTIONS 1 AND 2: 0
SUM OF ALL RESPONSES TO PHQ9 QUESTIONS 1 AND 2: 0
2. FEELING DOWN, DEPRESSED, IRRITABLE, OR HOPELESS: NOT AT ALL
1. LITTLE INTEREST OR PLEASURE IN DOING THINGS: NOT AT ALL
1. LITTLE INTEREST OR PLEASURE IN DOING THINGS: NOT AT ALL

## 2024-04-29 ASSESSMENT — PAIN DESCRIPTION - PAIN TYPE
TYPE: ACUTE PAIN

## 2024-04-29 ASSESSMENT — FIBROSIS 4 INDEX: FIB4 SCORE: 2.11

## 2024-04-29 NOTE — CARE PLAN
The patient is Stable - Low risk of patient condition declining or worsening    Shift Goals  Clinical Goals: Safety  Patient Goals: Remain updated on POC    Progress made toward(s) clinical / shift goals:      Problem: Skin Integrity  Goal: Skin integrity is maintained or improved  Outcome: Progressing  Note: Appropriate interventions in place to maintain/ improve skin integrity.      Problem: Fall Risk  Goal: Patient will remain free from falls  Outcome: Progressing  Note: Appropriate fall precautions in place.

## 2024-04-29 NOTE — PROGRESS NOTES
4 Eyes Skin Assessment Completed by BENJAMIN Ludwig and BENJAMIN Cristobal.    Head WDL  Ears dry  Nose WDL  Mouth WDL  Neck WDL  Breast/Chest WDL  Shoulder Blades WDL  Spine WDL  (R) Arm/Elbow/Hand Bruising  (L) Arm/Elbow/Hand Bruising   Left inner thumb tear  Abdomen dry  Groin Redness and Excoriation; barrier paste applied  Scrotum/Coccyx/Buttocks Redness, Blanching, Non-Blanching, Excoriation, and Discoloration;sacral mepilex applied   Right buttock    Left buttock: cleansed, visco paste and barrier paste applied per wound order    (R) Leg BKA; Redness, Blanching, and Bruising; scratches  (L) Leg Redness, Blanching, and Bruising; scratches   Left lateral shin/knee; mepilex applied  (L) Heel/Foot/Toe Redness, Blanching, Discoloration, and Bruising   Left medial ankle; mepilex applied          Devices In Places Blood Pressure Cuff, Pulse Ox, and Nasal Cannula      Interventions In Place NC W/Ear Foams, Heel Mepilex, Sacral Mepilex, TAP System, Pillows, Q2 Turns, Dri-Augie Pads, and Pressure Redistribution Mattress    Possible Skin Injury Yes    Pictures Uploaded Into Epic Yes, previously done  Wound Consult Placed Yes, previously done  RN Wound Prevention Protocol Ordered Yes, previously done

## 2024-04-29 NOTE — DISCHARGE PLANNING
Case Management Discharge Planning    Admission Date: 4/28/2024  GMLOS: 2.6  ALOS: 1    6-Clicks ADL Score: 11  6-Clicks Mobility Score: 8  PT and/or OT Eval ordered: Yes  Post-acute Referrals Ordered: Yes  Post-acute Choice Obtained: No  Has referral(s) been sent to post-acute provider:  Yes      Anticipated Discharge Dispo: Discharge Disposition: Discharged to home/self care (01)  Discharge Address: [address] [place] [address]  [address]  [facility] [address]  Discharge Contact Phone Number: [phone] (Home Phone)    DME Needed: No    Action(s) Taken: DC Assessment Complete (See below)    Escalations Completed: None    Medically Clear: No    Next Steps: LMSW to follow for any CM needs    Barriers to Discharge: Medical clearance    Is the patient up for discharge tomorrow: No

## 2024-04-29 NOTE — PROGRESS NOTES
Report received from ER RNHollie at 1550.  Patient arrived to T 426 via hospital bed by transport at 1605.  Assessment complete.    A&O x 3, disoriented to situation. Patient calls appropriately.   Patient ambulates with x2 assist. Patient wheel chair bound. Aphasia and R sided weakness d/t history of CVA. Bed alarm on.   Patient has 2/10 pain. No pharmacological interventions provided at this time. Pain managed with prescribed medications.  Denies N&V. Tolerating regular diet. 1:1 feeder.  R BKA.  - void, + flatus, + BM, last BM 4/28.  Patient denies SOB.    Review plan with of care with patient. Call light and personal belongings within reach. Hourly rounding in place. All needs met at this time.
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
## 2024-04-29 NOTE — DISCHARGE PLANNING
Case Management Discharge Planning    Admission Date: 4/28/2024  GMLOS: 2.6  ALOS: 1    Action(s) Taken: DC Assessment Complete (See below)    Care Transition Team Assessment    LMSW met with pt at bedside to conduct assessment, pts DEJUAN Schulte at bedside. Eris answers all questions for assessment, Pt seems to be oriented but confused to conversation in hospital setting.   Pt and Caregiver Eris have been friends for 40+ years and Eris has been caretaking for the pt since 2001.    Eris reports no issues with housing, food insecurity, or transport. Eris drives pt to all apts.     Information Source  Orientation Level: Oriented to place, Oriented to situation, Oriented to person, Oriented to time  Information Given By: Caregiver  Informant's Name: Eris  Who is responsible for making decisions for patient? : POA  Name(s) of Primary Decision Maker: Eris    Readmission Evaluation  Is this a readmission?: No    Elopement Risk  Legal Hold: No  Ambulatory or Self Mobile in Wheelchair: No-Not an Elopement Risk  Elopement Risk: Not at Risk for Elopement    Interdisciplinary Discharge Planning  Primary Care Physician: Becky Negrete  Lives with - Patient's Self Care Capacity: Unrelated Adult  Patient or legal guardian wants to designate a caregiver: Yes  Caregiver name: Eris  Caregiver contact info: [phone]  Support Systems: Friends / Neighbors  Housing / Facility: Mobile Home  Do You Take your Prescribed Medications Regularly: Yes  Able to Return to Previous ADL's: Future Time w/Therapy  Mobility Issues: Yes  Assistance Needed: Yes  Durable Medical Equipment:  (Wheelchair at Baseline / Missing leg)    Discharge Preparedness  What is your plan after discharge?: Skilled nursing facility  What are your discharge supports?: Other (comment) (Caregiver Eris)  Prior Functional Level: Independent with Activities of Daily Living, Uses Wheelchair, Wheelchair Dependent  Difficulity with ADLs: Walking  Difficulty with ADLs  Comment: Wheelchair    Functional Assesment  Prior Functional Level: Independent with Activities of Daily Living, Uses Wheelchair, Wheelchair Dependent    Finances  Financial Barriers to Discharge: No  Prescription Coverage: Yes    Vision / Hearing Impairment  Vision Impairment : Yes  Right Eye Vision: Wears Glasses  Left Eye Vision: Wears Glasses  Hearing Impairment : No    Advance Directive  Advance Directive?: DPOA for Health Care  Durable Power of  Name and Contact : [facility] [phone]    Domestic Abuse  Have you ever been the victim of abuse or violence?: No  Physical Abuse or Sexual Abuse: No  Verbal Abuse or Emotional Abuse: No  Possible Abuse/Neglect Reported to:: Not Applicable    Psychological Assessment  History of Substance Abuse: None  History of Psychiatric Problems: No  Non-compliant with Treatment: No  Newly Diagnosed Illness: No    Discharge Risks or Barriers  Discharge risks or barriers?: Complex medical needs  Patient risk factors: Vulnerable adult, Readmission    Anticipated Discharge Information  Discharge Disposition: Discharged to home/self care (01)  Discharge Address: [address] [place] [address]  [address]  [place] [address]  Discharge Contact Phone Number: [phone] (Home Phone)

## 2024-04-29 NOTE — DISCHARGE PLANNING
Spoke with Alen at [facility], patient would not be able to have any cancer tx while at SNF. ELO Wilson advised.

## 2024-04-29 NOTE — PROGRESS NOTES
NOC HOSPITALIST CROSS COVER    Notified by RN regarding critically supratherapeutic INR of > 10. It had been 7.67 on previous check, for which dosing was held. Per nursing report, the patient does have scant sanguineous drainage from a sacral wound. Given the level of his supratherapeutic INR, discussed with pharmacy and will give 5 mg PO Phytonadione to reverse. Pharmacy to follow up INR and dose warfarin as appropriate.     Monitor closely for bleeding.    -----------------------------------------------------------------------------------------------------------    Electronically signed by:  Janiya Cueto, TOM, APRN, ELVIRAP-BC  Hospitalist Services

## 2024-04-29 NOTE — DISCHARGE PLANNING
Case Management Discharge Planning    Admission Date: 4/28/2024  GMLOS: 2.6  ALOS: 1    6-Clicks ADL Score: 11  6-Clicks Mobility Score: 8    Anticipated Discharge Dispo: Discharge Disposition: Discharged to home/self care (01)  Discharge Address: [address] [place], [place] [address]  [place] [address]  Discharge Contact Phone Number: [phone] (Home Phone)    ARAM completed PASRR for if pt discharges to post accute placement.   Pending PT/OT recommendations.   PASRR - [id number]

## 2024-04-29 NOTE — DIETARY
NUTRITION SERVICES - Alert received for newly identified wound.     Wound team saw pt yesterday for deep tissue pressure injury to buttocks (POA).  No nutrition recommendations at this time.  RD will follow per dept guidelines.

## 2024-04-29 NOTE — PROGRESS NOTES
[facility] Medicine Daily Progress Note    Date of Service  4/29/2024    Chief Complaint  Drew Melton is a 68 y.o. male admitted 4/28/2024 with rectal pain    Hospital Course  Drew Melton is a 68 y.o. male with history of recent diagnosis of rectal squamous cell carcinoma on radiation therapy, history of CVA with residual dysarthria and right-sided weakness on warfarin, PVD s/p right BKA who presented 4/28/2024 with evaluation for severe rectal pain.  Patient reported severe pain in rectum, as well as lower abdomen, reported a ground-level fall.  Admits to poor oral intake.  In ER, noted to have potassium of 2.7, no acute findings on KUB.  Medical oncology has been consulted, recommended palliative care evaluation, formal evaluation to follow.  Palliative care consulted.  Admission requested by ERP.  Therefore, admitted to medicine service for further evaluation and treatment.     Interval Problem Update  Patient was seen and examined at bedside.  No acute events overnight. Patient is resting comfortably in bed and in no acute distress.     Pain control  INR >10  5 mg PO Phytonadione     I have discussed this patient's plan of care and discharge plan at IDT rounds today with Case Management, Nursing, Nursing leadership, and other members of the IDT team.    Consultants/Specialty  oncology and palliative care    Code Status  Full Code    Disposition  The patient is not medically cleared for discharge to home or a post-acute facility.      I have placed the appropriate orders for post-discharge needs.    Review of Systems  Review of Systems   Constitutional:  Negative for chills and fever.   HENT:  Negative for congestion and sore throat.    Eyes:  Negative for blurred vision and double vision.   Respiratory:  Negative for cough and shortness of breath.    Cardiovascular:  Negative for chest pain and palpitations.   Gastrointestinal:         Anal pain   Genitourinary:  Negative for dysuria and  frequency.   Musculoskeletal:  Negative for back pain and falls.   Skin:  Negative for rash.   Neurological:  Negative for seizures and headaches.   Psychiatric/Behavioral:  Negative for hallucinations and substance abuse.         Physical Exam  Temp:  [36.6 °C (97.9 °F)-37 °C (98.6 °F)] 36.6 °C (97.9 °F)  Pulse:  [85-95] 92  Resp:  [16-18] 18  BP: (123-153)/(65-75) 153/69  SpO2:  [93 %-98 %] 97 %    Physical Exam  Vitals reviewed.   HENT:      Head: Normocephalic.      Right Ear: External ear normal.      Left Ear: External ear normal.      Nose: No congestion.      Mouth/Throat:      Pharynx: No oropharyngeal exudate.   Eyes:      General: No scleral icterus.  Cardiovascular:      Rate and Rhythm: Normal rate and regular rhythm.      Heart sounds: No murmur heard.  Pulmonary:      Breath sounds: No wheezing.   Abdominal:      Palpations: Abdomen is soft.      Tenderness: There is no abdominal tenderness. There is no guarding or rebound.   Musculoskeletal:         General: No swelling or deformity.   Skin:     Coloration: Skin is not jaundiced.      Findings: No bruising.   Neurological:      General: No focal deficit present.      Motor: No weakness.   Psychiatric:         Mood and Affect: Mood normal.         Behavior: Behavior normal.         Fluids    Intake/Output Summary (Last 24 hours) at 4/29/2024 1556  Last data filed at 4/29/2024 1338  Gross per 24 hour   Intake 240 ml   Output 400 ml   Net -160 ml       Laboratory  Recent Labs     04/28/24  0832 04/29/24  0305   WBC 4.4* 3.7*   RBC 3.49* 2.79*   HEMOGLOBIN 11.8* 9.3*   HEMATOCRIT 31.9* 27.3*   MCV 91.4 97.8   MCH 33.8* 33.3*   MCHC 37.0* 34.1   RDW 61.2* 65.3*   PLATELETCT 150* 127*   MPV 9.4 9.6     Recent Labs     04/28/24  0832 04/29/24  0305   SODIUM 144 143   POTASSIUM 2.7* 4.4   CHLORIDE 106 114*   CO2 23 21   GLUCOSE 105* 104*   BUN 22 15   CREATININE 0.65 0.52   CALCIUM 9.5 7.8*     Recent Labs     04/28/24  0832 04/29/24  0305   INR 7.67*  >=10.00*               Imaging  [id number] VIEW   Final Result      No acute process.           Assessment/Plan  * Anal cancer (HCC)- (present on admission)  Assessment & Plan  Currently undergoing radiation therapy  Oncology recommendations  Palliative care following  Pain control    Supratherapeutic INR  Assessment & Plan  INR >10  5 mg PO Phytonadione     S/P BKA (below knee amputation) unilateral (HCC)- (present on admission)  Assessment & Plan  Status post right BKA    Hemiplegia of dominant side as late effect following cerebrovascular disease (HCC)- (present on admission)  Assessment & Plan  Has residual right-sided weakness, dysarthria  Continue warfarin, statin    Cancer associated pain  Assessment & Plan  Supportive pain control  Palliative care consult for systemic pain management    Dehydration  Assessment & Plan  IVF    Hypokalemia  Assessment & Plan  Replace  Replace Mg    ACP (advance care planning)- (present on admission)  Assessment & Plan  Palliative care following    Dyslipidemia (high LDL; low HDL)- (present on admission)  Assessment & Plan  Statin         VTE prophylaxis:    therapeutic anticoagulation with coumadin dosing per pharmacy, adjust PRN      I have performed a physical exam and reviewed and updated ROS and Plan today (4/29/2024). In review of yesterday's note (4/28/2024), there are no changes except as documented above.      Greater than 51 minutes spent prepping to see patient (e.g. review of tests) obtaining and/or reviewing separately obtained history. Performing a medically appropriate examination and/ evaluation.  Counseling and educating the patient/family/caregiver.  Ordering medications, tests, or procedures.  Referring and communicating with other health care professionals.  Documenting clinical information in EPIC.  Independently interpreting results and communicating results to patient/family/caregiver.  Care coordination.

## 2024-04-29 NOTE — CARE PLAN
Problem: Knowledge Deficit - Standard  Goal: Patient and family/care givers will demonstrate understanding of plan of care, disease process/condition, diagnostic tests and medications  Description: Target End Date:  1-3 days or as soon as patient condition allows    Document in Patient Education    1.  Patient and family/caregiver oriented to unit, equipment, visitation policy and means for communicating concern  2.  Complete/review Learning Assessment  3.  Assess knowledge level of disease process/condition, treatment plan, diagnostic tests and medications  4.  Explain disease process/condition, treatment plan, diagnostic tests and medications  Outcome: Progressing   The patient is Stable - Low risk of patient condition declining or worsening    Shift Goals  Clinical Goals: wound care, skin integrity, hydration, rest  Patient Goals: rest    Progress made toward(s) clinical / shift goals:  plan of care discussed with the pt and verbalized understanding.

## 2024-04-29 NOTE — PROGRESS NOTES
4 Eyes Skin Assessment Completed by BENJAMIN LEHMAN and BENJAMIN Mcbride.    Head WDL  Ears WDL  Nose WDL  Mouth WDL  Neck WDL  Breast/Chest WDL  Shoulder Blades WDL  Spine WDL  (R) Arm/Elbow/Hand WDL  (L) Arm/Elbow/Hand Bruising, anterior thumb tear WOODY  Abdomen WDL  Groin WDL  Scrotum/Coccyx/Buttocks scrotal redness, wound right buttock, wound left sacral area, wound perianal area- cleansed & viscopaste applied  (R) Leg scratches  (L) Leg scratches, bruising lateral area - mepilex in place    (L) Heel/Foot/Toe discoloration - mepilex in place          Devices In Places Blood Pressure Cuff and Pulse Ox, SCD at the left leg, PIV x 1      Interventions In Place Heel Mepilex, TAP System, Pillows, Q2 Turns, Barrier Cream, Dri-Augie Pads, and Heels Loaded W/Pillows, VANDANA bed - ordered    Possible Skin Injury Yes    Pictures Uploaded Into Epic Yes, previously  Wound Consult Placed Yes, previously  RN Wound Prevention Protocol Ordered Yes, previously

## 2024-04-29 NOTE — DISCHARGE PLANNING
Received choice form @: No choice received  Agency/Facility name: [facility]  Sent referral per choice form @: [phone]

## 2024-04-29 NOTE — CONSULTS
"MRN: [id number]  Date of palliative consult: 24  Reason for consult: Non-chronic pain management  Referring provider: Dr. Hanson  Location of consult: [facility]00  Additional consulting services: None    HPI:   Drew Melton is a 68 y.o. 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  Multiple recent emergency department visits for rectal pain.  Workup notable for hypokalemia without acute findings on KUB supratherapeutic INR.     Pain History:  Onset: 1 month but much worse over last week  Location: rectal area   Duration: constant with acute exacerbation  Characteristics: Unable to qualify \"ouch\"; did respond yes to a qualifiers of burning and sharp  Aggravating factors: sitting; bowel movements  Alleviating factors: has not had much relief from oral morphine or oral oxycodone  Radiation: non-radiation  Treatments: scheduled tylenol, oral morphine solution 4 mg x 2 doses and oxycodone 10 mg x 1 dose  Severity: 5/10    Additional symptoms: constipation    Medication Allergy/Sensitivities:  No Known Allergies    ROS:    Review of Systems   Constitutional:  Positive for malaise/fatigue and weight loss.   Gastrointestinal:  Positive for constipation. Negative for nausea.        Poor appetite due to fear of eating due to rectal pain   Musculoskeletal:  Negative for back pain.   Psychiatric/Behavioral:  Negative for depression and substance abuse. The patient is not nervous/anxious and does not have insomnia.      PE:   Recent vital signs  BMI: Body mass index is 20.34 kg/m².    Temp (24hrs), Av.8 °C (98.2 °F), Min:36.6 °C (97.9 °F), Max:37 °C (98.6 °F)  Temperature: 36.6 °C (97.9 °F)  Pulse  Av.9  Min: 78  Max: 101   Blood " Call to patient. Started Humira injections 32-34 days ago. States he spoke with his Humira ambassador this am and she gave him some reassurance. States can take up to 4 months to be able to tell if medication is working. States overall he is doing \"90% better.\" Still has someday's that are more uncomfortable than others. States he is requesting Prednisone refill \"for the difficult days. I would love to have 10-20 at home just in case.\" States he has 2 weddings, his wife's birthday, Alyssa, and 2 other big birthday parties coming up and having the prednisone just in case he is having a bad day on one of those days, he could take it. \"It gives me confidence, helps me go to the bathroom, and helps me feel better over all.\" Advised would get message to team for review. Pt agrees. Last OV recs f/u telephone visit 4 weeks after starting medication. Will await someone to call him to schedule visit.     Pharmacy verified.    Pressure : (!) 153/75 (Anitra RN notified), NIBP: 130/65       Physical Exam  HENT:      Mouth/Throat:      Mouth: Mucous membranes are moist.      Pharynx: Oropharynx is clear.   Pulmonary:      Effort: Pulmonary effort is normal.      Breath sounds: Normal breath sounds.   Abdominal:      General: There is no distension.      Palpations: Abdomen is soft.   Musculoskeletal:      Comments: Right BKA   Skin:     Coloration: Skin is pale.   Neurological:      Mental Status: He is alert and oriented to person, place, and time.      Comments: Dysarthric with speech   Psychiatric:         Mood and Affect: Mood normal.         Behavior: Behavior normal.         Thought Content: Thought content normal.         Judgment: Judgment normal.       Recent Labs     04/28/24  0832 04/29/24  0305   SODIUM 144 143   POTASSIUM 2.7* 4.4   CHLORIDE 106 114*   CO2 23 21   GLUCOSE 105* 104*   BUN 22 15   CREATININE 0.65 0.52   CALCIUM 9.5 7.8*     Recent Labs     04/28/24  0832 04/29/24  0305   WBC 4.4* 3.7*   RBC 3.49* 2.79*   HEMOGLOBIN 11.8* 9.3*   HEMATOCRIT 31.9* 27.3*   MCV 91.4 97.8   MCH 33.8* 33.3*   MCHC 37.0* 34.1   RDW 61.2* 65.3*   PLATELETCT 150* 127*   MPV 9.4 9.6     ASSESSMENT/PLAN WITH SHARED DECISION MAKING:   Medications reviewed. Labs Reviewed.   Pertinent imaging reviewed.    PHYSICAL ASPECTS OF CARE  Palliative Performance Scale: 50%    # Squamous cell carcinoma of the rectum undergoing radiation   - Pending oncology consult   - Reached out to [facility] [place] as patient had a consult with Dr. Lane 3/11  - Personalized Radiation Oncology; call placed and patient scheduled to be done 5/8 (now later due to hospitalization)  # Cancer associated pain  - Patient is not responding to opioids per his report (morphine/oxycodone trialed)  - Trial dexamethasone 4 mg p.o. x 1 dose give with food to avoid gastric irritation  - Patient is already on Protonix 40 mg IV twice daily  - Start gabapentin 300 mg PO HS  - Monitor for  constipation; bowel regimen in place  # Dehydration with multiple electrolyte derangements - continue with fluids/replacement, encourage oral intake  # Supratherapeutic INR  # PVD status post BKA  # History of CVA resulting in hemiplegia of the dominant side, dysarthria, and cognitive challenges  # Other chronic conditions include DLD    SOCIAL ASPECTS OF CARE  Patient is single.  He lives with his caregiver and healthcare agent Eris Gomez.  Eris works graveyard's cleaning at the [employer].    SPIRITUAL ASPECTS OF CARE   Patient is [employer] and is wearing a wooden cross.  He pointed at both ears and said Richie and God.  Offered spiritual care visit from  which patient would appreciate.  Order placed.    GOALS OF CARE/SERIOUS ILLNESS CONVERSATION  Introduced myself to patient. Discussed role of palliative care and reason for consult.  He is agreeable to discuss goals of care.  Patient has significant dysarthria but answers simple yes/no questions well and he took this approach with questioning.  We discussed his CODE STATUS.  He expressed he likely would not want CPR and would not want to go on life support as he would prefer a natural death.  He would like to discuss with his healthcare agent prior to changing anything.  We reviewed POLST form in brief.  Patient is unsure about any type of artificial nutrition in the future.  He became slightly overwhelmed due to his challenging speech.  Confirmed I will reach out to Eris and set up a time to meet together. Provided palliative care contact information and encouraged patient to reach out with any questions/needs.     Phone call to patient's healthcare agent Eris.  Introduced myself and discussed role on care team.  Provided update of above information and Eris is in agreement.  He is planning on visiting the patient tomorrow morning around 9 AM after he gets off work.  Confirmed I will coordinate and we can review POLST form and clarify  "patient's healthcare wishes for future care.  Offered phone number but Eris confirms she will see me tomorrow and can get it then.    Code Status: Full    ACP Documents: Advance Directive on file reviewed: Healthcare agent is Eris Gomez.    16 minutes spent discussing advance care planning, this time excludes any other billed services.    Interval diagnostic studies and medical documentation entries pertinent to this case were reviewed independently by me. This patient has at least one acute or chronic illness or injury that poses a threat to life or bodily function. This patient suffers from a high risk of morbidly from additional invasive diagnostic testing or intensive treatment. Discussion of recommendations and coordination of care undertaken with primary provider/treatment team.      Mari \"Mulu\" ABDIAZIZ Parra, [facility]-BC  Palliative Care Nurse Practitioner   Mon-Fri 8AM-5PM  [phone]      "

## 2024-04-29 NOTE — HOSPITAL COURSE
Drew Melton is a 68 y.o. male with history of recent diagnosis of rectal squamous cell carcinoma on radiation therapy, history of CVA with residual dysarthria and right-sided weakness on warfarin, PVD s/p right BKA who presented 4/28/2024 with evaluation for severe rectal pain.  Patient reported severe pain in rectum, as well as lower abdomen, reported a ground-level fall.  Admits to poor oral intake.  In ER, noted to have potassium of 2.7, no acute findings on KUB.  Medical oncology has been consulted, recommended palliative care evaluation, formal evaluation to follow.  Palliative care consulted.  Admission requested by ERP.  Therefore, admitted to medicine service for further evaluation and treatment.

## 2024-04-29 NOTE — PROGRESS NOTES
Inpatient Anticoagulation Service Note for 4/29/2024    Reason for Anticoagulation: Stroke, Deep Vein Thrombosis   Target INR: 2.0 to 3.0    Hemoglobin Value: (!) 9.3 (Results confirmed by repeat testing.)  Hematocrit Value: (!) 27.3  Lab Platelet Value: (!) 127    INR from last 7 days       Date/Time INR Value    04/29/24 0305  >/= 10.00    04/28/24 0832 7.67     Dose from last 7 days       Date/Time Dose (mg)    04/29/24  0    04/28/24  0     Assessment:   Home Dose: Per [facility], Warfarin 2.5 mg po every M/W/F and Warfarin 1.25 mg PO on all other days. (Recent decrease)   Time in therapeutic range outpatient: 74% reported   Established interaction (home meds): Statin, PPI   New/Acute potential interactions: No major DDIs identified.   Bridge Therapy: No   Reversal Agent Administered: Yes, Vitamin K 5 mg PO x one this morning (4/29/24).   Inpatient Diet: Regular easy to chew, 1:1 supervision. Albumin is normal.   CBC: +Leukopenia, anemia & thrombocytopenia. Serosanguinous output reported in notes from sacral wound.   INR: Supratherapeutic. Also supratherapeutic at 4.3 at latest [facility] clinic visit 4/23/24. Patient was instructed to hold warfarin x 2 days then start a reduced dosing regimen as noted above.      Plan: Continue to hold warfarin until INR returns to therapeutic range or closer to 3. Continue daily INR monitoring.     Education Material Provided?: No (Established [facility] patient)    Pharmacist suggested discharge dosing: Warfarin 2.5 mg po every M/W/F and Warfarin 1.25 mg PO on all other days     Cherie Garcia, [place], BCPS

## 2024-04-30 ENCOUNTER — HOSPICE ADMISSION (OUTPATIENT)
Dept: HOSPICE | Facility: HOSPICE | Age: 69
End: 2024-04-30
Payer: MEDICARE

## 2024-04-30 VITALS
DIASTOLIC BLOOD PRESSURE: 66 MMHG | WEIGHT: 150 LBS | HEIGHT: 72 IN | RESPIRATION RATE: 16 BRPM | TEMPERATURE: 98.4 F | BODY MASS INDEX: 20.32 KG/M2 | HEART RATE: 90 BPM | OXYGEN SATURATION: 93 % | SYSTOLIC BLOOD PRESSURE: 130 MMHG

## 2024-04-30 PROBLEM — D70.1 LEUKOPENIA DUE TO ANTINEOPLASTIC CHEMOTHERAPY (HCC): Status: ACTIVE | Noted: 2024-04-30

## 2024-04-30 PROBLEM — D64.9 NORMOCYTIC ANEMIA: Status: ACTIVE | Noted: 2024-04-30

## 2024-04-30 PROBLEM — T45.1X5A LEUKOPENIA DUE TO ANTINEOPLASTIC CHEMOTHERAPY (HCC): Status: ACTIVE | Noted: 2024-04-30

## 2024-04-30 LAB
ALBUMIN SERPL BCP-MCNC: 3.3 G/DL (ref 3.2–4.9)
BASOPHILS # BLD AUTO: 0.4 % (ref 0–1.8)
BASOPHILS # BLD: 0.01 K/UL (ref 0–0.12)
BUN SERPL-MCNC: 10 MG/DL (ref 8–22)
CALCIUM ALBUM COR SERPL-MCNC: 8.7 MG/DL (ref 8.5–10.5)
CALCIUM SERPL-MCNC: 8.1 MG/DL (ref 8.5–10.5)
CHLORIDE SERPL-SCNC: 113 MMOL/L (ref 96–112)
CO2 SERPL-SCNC: 21 MMOL/L (ref 20–33)
CREAT SERPL-MCNC: 0.52 MG/DL (ref 0.5–1.4)
EOSINOPHIL # BLD AUTO: 0.01 K/UL (ref 0–0.51)
EOSINOPHIL NFR BLD: 0.4 % (ref 0–6.9)
ERYTHROCYTE [DISTWIDTH] IN BLOOD BY AUTOMATED COUNT: 67.7 FL (ref 35.9–50)
GFR SERPLBLD CREATININE-BSD FMLA CKD-EPI: 110 ML/MIN/1.73 M 2
GLUCOSE SERPL-MCNC: 122 MG/DL (ref 65–99)
HCT VFR BLD AUTO: 28.3 % (ref 42–52)
HGB BLD-MCNC: 9.8 G/DL (ref 14–18)
IMM GRANULOCYTES # BLD AUTO: 0.06 K/UL (ref 0–0.11)
IMM GRANULOCYTES NFR BLD AUTO: 2.1 % (ref 0–0.9)
INR PPP: 2.66 (ref 0.87–1.13)
LYMPHOCYTES # BLD AUTO: 0.21 K/UL (ref 1–4.8)
LYMPHOCYTES NFR BLD: 7.4 % (ref 22–41)
MAGNESIUM SERPL-MCNC: 1.8 MG/DL (ref 1.5–2.5)
MCH RBC QN AUTO: 33.7 PG (ref 27–33)
MCHC RBC AUTO-ENTMCNC: 34.6 G/DL (ref 32.3–36.5)
MCV RBC AUTO: 97.3 FL (ref 81.4–97.8)
MONOCYTES # BLD AUTO: 0.09 K/UL (ref 0–0.85)
MONOCYTES NFR BLD AUTO: 3.2 % (ref 0–13.4)
NEUTROPHILS # BLD AUTO: 2.45 K/UL (ref 1.82–7.42)
NEUTROPHILS NFR BLD: 86.5 % (ref 44–72)
NRBC # BLD AUTO: 0.02 K/UL
NRBC BLD-RTO: 0.7 /100 WBC (ref 0–0.2)
PHOSPHATE SERPL-MCNC: 1.8 MG/DL (ref 2.5–4.5)
PLATELET # BLD AUTO: 126 K/UL (ref 164–446)
PMV BLD AUTO: 9.3 FL (ref 9–12.9)
POTASSIUM SERPL-SCNC: 5 MMOL/L (ref 3.6–5.5)
PROTHROMBIN TIME: 28.8 SEC (ref 12–14.6)
RBC # BLD AUTO: 2.91 M/UL (ref 4.7–6.1)
SODIUM SERPL-SCNC: 143 MMOL/L (ref 135–145)
WBC # BLD AUTO: 2.8 K/UL (ref 4.8–10.8)

## 2024-04-30 PROCEDURE — A9270 NON-COVERED ITEM OR SERVICE: HCPCS | Performed by: STUDENT IN AN ORGANIZED HEALTH CARE EDUCATION/TRAINING PROGRAM

## 2024-04-30 PROCEDURE — 99497 ADVNCD CARE PLAN 30 MIN: CPT | Performed by: NURSE PRACTITIONER

## 2024-04-30 PROCEDURE — 700102 HCHG RX REV CODE 250 W/ 637 OVERRIDE(OP): Performed by: STUDENT IN AN ORGANIZED HEALTH CARE EDUCATION/TRAINING PROGRAM

## 2024-04-30 PROCEDURE — 99498 ADVNCD CARE PLAN ADDL 30 MIN: CPT | Performed by: NURSE PRACTITIONER

## 2024-04-30 PROCEDURE — A9270 NON-COVERED ITEM OR SERVICE: HCPCS | Performed by: NURSE PRACTITIONER

## 2024-04-30 PROCEDURE — 700105 HCHG RX REV CODE 258: Performed by: STUDENT IN AN ORGANIZED HEALTH CARE EDUCATION/TRAINING PROGRAM

## 2024-04-30 PROCEDURE — 99232 SBSQ HOSP IP/OBS MODERATE 35: CPT | Performed by: STUDENT IN AN ORGANIZED HEALTH CARE EDUCATION/TRAINING PROGRAM

## 2024-04-30 PROCEDURE — 700102 HCHG RX REV CODE 250 W/ 637 OVERRIDE(OP): Performed by: NURSE PRACTITIONER

## 2024-04-30 PROCEDURE — 700101 HCHG RX REV CODE 250: Performed by: STUDENT IN AN ORGANIZED HEALTH CARE EDUCATION/TRAINING PROGRAM

## 2024-04-30 PROCEDURE — 99233 SBSQ HOSP IP/OBS HIGH 50: CPT | Mod: 25 | Performed by: NURSE PRACTITIONER

## 2024-04-30 PROCEDURE — 770001 HCHG ROOM/CARE - MED/SURG/GYN PRIV*

## 2024-04-30 RX ORDER — HALOPERIDOL 5 MG/ML
1 INJECTION INTRAMUSCULAR
Status: DISCONTINUED | OUTPATIENT
Start: 2024-04-30 | End: 2024-05-01 | Stop reason: HOSPADM

## 2024-04-30 RX ORDER — SODIUM CHLORIDE, SODIUM LACTATE, POTASSIUM CHLORIDE, CALCIUM CHLORIDE 600; 310; 30; 20 MG/100ML; MG/100ML; MG/100ML; MG/100ML
INJECTION, SOLUTION INTRAVENOUS ONCE
Status: COMPLETED | OUTPATIENT
Start: 2024-04-30 | End: 2024-04-30

## 2024-04-30 RX ORDER — DEXAMETHASONE 4 MG/1
4 TABLET ORAL 2 TIMES DAILY
Status: DISCONTINUED | OUTPATIENT
Start: 2024-04-30 | End: 2024-05-01 | Stop reason: HOSPADM

## 2024-04-30 RX ORDER — LACTULOSE 20 G/30ML
10 SOLUTION ORAL
Status: DISCONTINUED | OUTPATIENT
Start: 2024-04-30 | End: 2024-05-01 | Stop reason: HOSPADM

## 2024-04-30 RX ORDER — BISACODYL 10 MG
10 SUPPOSITORY, RECTAL RECTAL
Status: DISCONTINUED | OUTPATIENT
Start: 2024-04-30 | End: 2024-05-01 | Stop reason: HOSPADM

## 2024-04-30 RX ORDER — WARFARIN SODIUM 2.5 MG/1
1.25 TABLET ORAL
Status: DISCONTINUED | OUTPATIENT
Start: 2024-04-30 | End: 2024-05-01 | Stop reason: HOSPADM

## 2024-04-30 RX ORDER — WARFARIN SODIUM 2.5 MG/1
2.5 TABLET ORAL
Status: DISCONTINUED | OUTPATIENT
Start: 2024-05-01 | End: 2024-05-01 | Stop reason: HOSPADM

## 2024-04-30 RX ORDER — CARBOXYMETHYLCELLULOSE SODIUM 5 MG/ML
1 SOLUTION/ DROPS OPHTHALMIC PRN
Status: DISCONTINUED | OUTPATIENT
Start: 2024-04-30 | End: 2024-05-01 | Stop reason: HOSPADM

## 2024-04-30 RX ORDER — OXYCODONE HYDROCHLORIDE AND ACETAMINOPHEN 5; 325 MG/1; MG/1
2 TABLET ORAL EVERY 6 HOURS
Status: DISCONTINUED | OUTPATIENT
Start: 2024-04-30 | End: 2024-05-01 | Stop reason: HOSPADM

## 2024-04-30 RX ORDER — HALOPERIDOL 2 MG/ML
1 SOLUTION ORAL
Status: DISCONTINUED | OUTPATIENT
Start: 2024-04-30 | End: 2024-05-01 | Stop reason: HOSPADM

## 2024-04-30 RX ADMIN — OXYCODONE AND ACETAMINOPHEN 2 TABLET: 5; 325 TABLET ORAL at 11:32

## 2024-04-30 RX ADMIN — OXYCODONE 5 MG: 5 TABLET ORAL at 02:11

## 2024-04-30 RX ADMIN — OXYCODONE 5 MG: 5 TABLET ORAL at 08:32

## 2024-04-30 RX ADMIN — ACETAMINOPHEN 650 MG: 325 TABLET, FILM COATED ORAL at 04:59

## 2024-04-30 RX ADMIN — ACETAMINOPHEN 650 MG: 325 TABLET, FILM COATED ORAL at 00:05

## 2024-04-30 RX ADMIN — SODIUM CHLORIDE, POTASSIUM CHLORIDE, SODIUM LACTATE AND CALCIUM CHLORIDE: 600; 310; 30; 20 INJECTION, SOLUTION INTRAVENOUS at 14:07

## 2024-04-30 RX ADMIN — ATORVASTATIN CALCIUM 40 MG: 40 TABLET, FILM COATED ORAL at 17:40

## 2024-04-30 RX ADMIN — WARFARIN SODIUM 1.25 MG: 2.5 TABLET ORAL at 18:04

## 2024-04-30 RX ADMIN — GABAPENTIN 300 MG: 300 CAPSULE ORAL at 20:41

## 2024-04-30 RX ADMIN — OXYCODONE AND ACETAMINOPHEN 2 TABLET: 5; 325 TABLET ORAL at 17:39

## 2024-04-30 RX ADMIN — POTASSIUM CHLORIDE AND SODIUM CHLORIDE: 900; 300 INJECTION, SOLUTION INTRAVENOUS at 04:54

## 2024-04-30 RX ADMIN — DEXAMETHASONE 4 MG: 4 TABLET ORAL at 11:32

## 2024-04-30 ASSESSMENT — ENCOUNTER SYMPTOMS
NERVOUS/ANXIOUS: 0
DEPRESSION: 0
INSOMNIA: 1
CONSTIPATION: 0
ABDOMINAL PAIN: 1

## 2024-04-30 ASSESSMENT — PAIN DESCRIPTION - PAIN TYPE
TYPE: ACUTE PAIN
TYPE: ACUTE PAIN

## 2024-04-30 ASSESSMENT — LIFESTYLE VARIABLES: SUBSTANCE_ABUSE: 0

## 2024-04-30 NOTE — PROGRESS NOTES
[facility] Medicine Daily Progress Note    Date of Service  4/30/2024    Chief Complaint  Drew Melton is a 68 y.o. male admitted 4/28/2024 with rectal pain    Hospital Course  Drew Melton is a 68 y.o. male with history of recent diagnosis of rectal squamous cell carcinoma on radiation therapy, history of CVA with residual dysarthria and right-sided weakness on warfarin, PVD s/p right BKA who presented 4/28/2024 with evaluation for severe rectal pain.  Patient reported severe pain in rectum, as well as lower abdomen, reported a ground-level fall.  Admits to poor oral intake.  In ER, noted to have potassium of 2.7, no acute findings on KUB.  Medical oncology has been consulted, recommended palliative care evaluation, formal evaluation to follow.  Palliative care consulted.  Admission requested by ERP.  Therefore, admitted to medicine service for further evaluation and treatment.     Interval Problem Update    COLLEEN ON  POC discussed with palliative ABDIAZIZ Harwdick. He and his POA Eris have opted for hospice referral.  POC discussed with oncologist Dr. Gibbs and radiation oncologist Dr. Adams. They had counseled him that he has curable disease. Advised them he has clearly opted for hospice.  He is preparing to meet Richie.  Advised him that we transition to relief of suffering at EOL if those are his wishes.  He has pain in his rectum and perineum.  He was agreeable to de-escalation of further diagnostic testing.  Comfort care meds ordered, continuing IVF for now.  POC discussed with pharmacist Cherie, will continue warfarin without INR monitoring for now pending medication reconciliation and POC from [facility].  INR reviewed, decreased to 2.7.  CBC reviewed, stable Hgb / Plt with decreased WBC 2.8.  Mg normal.  RFP reviewed, normal. K increased to 5.  NS with 40 Kcl transitioned to LR.      I have discussed this patient's plan of care and discharge plan at IDT rounds today with Case Management, Nursing,  Nursing leadership, and other members of the IDT team.    Consultants/Specialty  oncology and palliative care    Code Status  DNAR/DNI    Disposition  The patient is medically cleared for discharge to home or a post-acute facility.  Anticipate discharge to: hospice    I have placed the appropriate orders for post-discharge needs.    Review of Systems  Review of Systems   Gastrointestinal:  Positive for abdominal pain.   Psychiatric/Behavioral:  Negative for depression. The patient is not nervous/anxious.         Physical Exam  Temp:  [36.6 °C (97.9 °F)-36.8 °C (98.2 °F)] 36.7 °C (98.1 °F)  Pulse:  [] 100  Resp:  [17-18] 17  BP: (103-153)/(69-73) 103/69  SpO2:  [92 %-97 %] 95 %    Physical Exam  Vitals and nursing note reviewed.   Constitutional:       Appearance: He is ill-appearing (Chronically).      Comments: Appears comfortable   HENT:      Head:      Comments: Slight bitemporal wasting     Nose: Nose normal.   Eyes:      General: No scleral icterus.     Conjunctiva/sclera: Conjunctivae normal.   Pulmonary:      Effort: Pulmonary effort is normal. No respiratory distress.   Genitourinary:     Comments: No diaz  Musculoskeletal:      Cervical back: Neck supple.   Neurological:      Mental Status: He is alert.      Cranial Nerves: Dysarthria present.   Psychiatric:         Attention and Perception: Attention and perception normal.         Mood and Affect: Mood normal. Affect is blunt.         Speech: Speech is slurred.         Behavior: Behavior normal. Behavior is cooperative.         Thought Content: Thought content normal.         Cognition and Memory: Cognition and memory normal.         Judgment: Judgment normal.         Fluids    Intake/Output Summary (Last 24 hours) at 4/30/2024 1353  Last data filed at 4/30/2024 0210  Gross per 24 hour   Intake --   Output 750 ml   Net -750 ml       Laboratory  Recent Labs     04/28/24  0832 04/29/24  0305 04/30/24  0536   WBC 4.4* 3.7* 2.8*   RBC 3.49* 2.79* 2.91*    HEMOGLOBIN 11.8* 9.3* 9.8*   HEMATOCRIT 31.9* 27.3* 28.3*   MCV 91.4 97.8 97.3   MCH 33.8* 33.3* 33.7*   MCHC 37.0* 34.1 34.6   RDW 61.2* 65.3* 67.7*   PLATELETCT 150* 127* 126*   MPV 9.4 9.6 9.3     Recent Labs     04/28/24  0832 04/29/24  0305 04/30/24  0536   SODIUM 144 143 143   POTASSIUM 2.7* 4.4 5.0   CHLORIDE 106 114* 113*   CO2 23 21 21   GLUCOSE 105* 104* 122*   BUN 22 15 10   CREATININE 0.65 0.52 0.52   CALCIUM 9.5 7.8* 8.1*     Recent Labs     04/28/24  0832 04/29/24  0305 04/30/24  0536   INR 7.67* >=10.00* 2.66*               Imaging  [id number] VIEW   Final Result      No acute process.           Assessment/Plan  * Anal cancer (HCC)- (present on admission)  Assessment & Plan  Stage IIIA with curative intent  Follows with [facility] oncologist Dr. Caceres and rad Onc Dr. Angel  Chemoradiation paused due to poor performance status  Palliative consulted, opted for hospice enrollment    Normocytic anemia- (present on admission)  Assessment & Plan  Likely AOCD  No further testing for planned hospice enrollment    Leukopenia due to antineoplastic chemotherapy (HCC)- (present on admission)  Assessment & Plan  Due to xeloda  Oncology consulted, holding further antineoplastic therapy  No further testing for planned hospice enrollment    Supratherapeutic INR- (present on admission)  Assessment & Plan  Resolved s/p phytonadione  Likely due to inadequate PO intake  Continue warfarin for now pending hospice enrollment    Dehydration- (present on admission)  Assessment & Plan  Due to inadequate PO intake due to food/drink aversion  Continue IVF for supportive care pending hospice enrollment    Hypokalemia  Assessment & Plan  Resolved  Due to inadequate PO intake  Mg normal  No further testing for planned hospice enrollment    Cancer associated pain- (present on admission)  Assessment & Plan  Palliative care consult for pain management  Hospice enrollment    S/P BKA (below knee amputation) unilateral (HCC)- (present on  admission)  Assessment & Plan  Status post right BKA    Dyslipidemia (high LDL; low HDL)- (present on admission)  Assessment & Plan  Statin    Hemiplegia of dominant side as late effect following cerebrovascular disease (HCC)- (present on admission)  Assessment & Plan  Has residual right-sided weakness, dysarthria  Continue warfarin, statin    Cognitive deficit, post-stroke- (present on admission)  Assessment & Plan  Delirium precautions       VTE prophylaxis:    therapeutic anticoagulation with coumadin dosing per pharmacy, adjust PRN    I have performed a physical exam and reviewed and updated ROS and Plan today (4/30/2024). In review of yesterday's note (4/29/2024), there are no changes except as documented above.

## 2024-04-30 NOTE — DISCHARGE PLANNING
Referral Management Team     Choice form signed for   [facility]    Referral sent per choice     1330 Referral accepted by [facility]   DC transport scheduled for 5/1/24 at 10:00 am.  Medical team notified.

## 2024-04-30 NOTE — PROGRESS NOTES
"MRN: [id number]  Date of palliative consult: 04/29/24  Reason for consult: Non-chronic pain management  Referring provider: Dr. Hanson  Location of consult: [facility]-00  Additional consulting services: None    HPI:   Drew Melton is a 68 y.o. 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  Multiple recent emergency department visits for rectal pain.  Workup notable for hypokalemia without acute findings on KUB supratherapeutic INR.     Pain History:  Onset: 1 month but much worse over last week  Location: rectal area   Duration: constant with acute exacerbation  Characteristics: Unable to qualify \"ouch\"; did respond yes to a qualifiers of burning and sharp  Aggravating factors: sitting; bowel movements  Alleviating factors: has not had much relief from oral morphine or oral oxycodone  Radiation: non-radiation  Treatments: scheduled tylenol, oral morphine solution 4 mg x 2 doses and oxycodone 10 mg x 1 dose  Severity: 5/10    Additional symptoms: constipation    Interval History:  4/30 patient still reports 5/10 severity.  He has not noticed a huge difference in his pain.  He did not sleep well and only sleeps about an hour at a time.  Reviewed pain ratings and it appears that oxycodone 10 mg helps take patient's pain from a 5/10 severity to a 2/10 in severity.  Will schedule some pain medication to see if this helps as patient may have a difficult time asking for pain medication given his dysarthria.  See assessment and plan for details.  Discussed care options with patient and POA and they have elected for hospice care.  Okay with continued selective treatment while in the hospital.    ROS:    Review of Systems   Constitutional:  Negative " for malaise/fatigue.   Gastrointestinal:  Negative for constipation.        Poor appetite due to fear of eating due to rectal pain and avoidance of bowel movements.    Psychiatric/Behavioral:  Negative for substance abuse. The patient has insomnia. The patient is not nervous/anxious.      PE:   Recent vital signs  BMI: Body mass index is 20.34 kg/m².    Temp (24hrs), Av.7 °C (98 °F), Min:36.6 °C (97.9 °F), Max:36.8 °C (98.2 °F)  Temperature: 36.6 °C (97.9 °F)  Pulse  Av  Min: 78  Max: 101   Blood Pressure : 132/72       Physical Exam  HENT:      Mouth/Throat:      Mouth: Mucous membranes are moist.      Pharynx: Oropharynx is clear.   Pulmonary:      Effort: Pulmonary effort is normal.      Breath sounds: Normal breath sounds.   Abdominal:      General: There is no distension.      Palpations: Abdomen is soft.   Musculoskeletal:      Comments: Right BKA   Skin:     Coloration: Skin is pale.   Neurological:      Mental Status: He is alert and oriented to person, place, and time.      Comments: Dysarthric with speech   Psychiatric:         Mood and Affect: Mood normal.         Behavior: Behavior normal.         Thought Content: Thought content normal.         Judgment: Judgment normal.     Recent Labs     24  0832 24  0305 24  0536   SODIUM 144 143 143   POTASSIUM 2.7* 4.4 5.0   CHLORIDE 106 114* 113*   CO2 23 21 21   GLUCOSE 105* 104* 122*   BUN 22 15 10   CREATININE 0.65 0.52 0.52   CALCIUM 9.5 7.8* 8.1*     Recent Labs     24  0832 24  0305 24  0536   WBC 4.4* 3.7* 2.8*   RBC 3.49* 2.79* 2.91*   HEMOGLOBIN 11.8* 9.3* 9.8*   HEMATOCRIT 31.9* 27.3* 28.3*   MCV 91.4 97.8 97.3   MCH 33.8* 33.3* 33.7*   MCHC 37.0* 34.1 34.6   RDW 61.2* 65.3* 67.7*   PLATELETCT 150* 127* 126*   MPV 9.4 9.6 9.3     ASSESSMENT/PLAN WITH SHARED DECISION MAKING:   Review    PHYSICAL ASPECTS OF CARE  Palliative Performance Scale: 40%    # Squamous cell carcinoma of the rectum undergoing radiation    4/29  - Pending oncology consult   - Reached out to [facility] as patient had a consult with Dr. Lane 3/11  - [facility]; call placed and patient scheduled to be done 5/8 (now later due to hospitalization)  4/30  - Seen by Dr. Gibbs yesterday; Xeloda being held  - Long discussion with patient and patient's POA/roommate Eris and they have elected not to continue withy cancer treatment and elect hospice care  - Discussed primary oncologist is Dr. Caceres with Dr. Prince who will notify choice regarding hospice  - I called [facility] to notify them of discontinuation of radiation oncology and election for hospice  # Cancer associated pain  4/29  - Patient is not responding to opioids per his report (morphine/oxycodone trialed)  - Trial dexamethasone 4 mg p.o. x 1 dose give with food to avoid gastric irritation  - Patient is already on Protonix 40 mg IV twice daily  - Start gabapentin 300 mg PO HS  - Monitor for constipation; bowel regimen in place  4/30  Start dexamethasone 4 mg PO BID x 7 days  Start oxycodone-acetaminophen 5-35 mg 2 tablets PO Q 6 hours  Discontinue scheduled acetaminophen in lieu of above  Increase nocturnal gabapentin to 600 mg PO HS  Continue bowel regimen; smear this morning  # Dehydration with multiple electrolyte derangements - continue with fluids/replacement, encourage oral intake  # Supratherapeutic INR received oral phytonadione 4/29  # PVD status post BKA  # History of CVA resulting in hemiplegia of the dominant side, dysarthria, and cognitive challenges  # Other chronic conditions include DLD    SOCIAL ASPECTS OF CARE  Patient is single.  He lives with his caregiver and healthcare agent Eris Gomez.  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    SPIRITUAL ASPECTS OF CARE   Patient is Mandaeism and is wearing a wooden cross.  He pointed at both ears and said Richie and God.  Offered spiritual care visit from   which patient would appreciate.  Order placed 4/29.     GOALS OF CARE/SERIOUS ILLNESS CONVERSATION  4/29 Introduced myself to patient. Discussed role of palliative care and reason for consult.  He is agreeable to discuss goals of care.  Patient has significant dysarthria but answers simple yes/no questions well and he took this approach with questioning.  We discussed his CODE STATUS.  He expressed he likely would not want CPR and would not want to go on life support as he would prefer a natural death.  He would like to discuss with his healthcare agent prior to changing anything.  We reviewed POLST form in brief.  Patient is unsure about any type of artificial nutrition in the future.  He became slightly overwhelmed due to his challenging speech.  Confirmed I will reach out to Eris and set up a time to meet together. Provided palliative care contact information and encouraged patient to reach out with any questions/needs.     Phone call to patient's healthcare agent Eris.  Introduced myself and discussed role on care team.  Provided update of above information and Eris is in agreement.  He is planning on visiting the patient tomorrow morning around 9 AM after he gets off work.  Confirmed I will coordinate and we can review POLST form and clarify patient's healthcare wishes for future care.  Offered phone number but Eris confirms she will see me tomorrow and can get it then.    4/30 met with patient and patient's healthcare proxy/power of  Eris.  Long discussion regarding care options including SNF while holding cancer treatment in hopes to complete radiation therapy versus hospice care.  All options discussed in detail.  Patient ultimately deferred to Eris and Eris feels that cancer treatment is causing patient to have failure to thrive and significant pain.  He feels that hospice is the best option at this point.  Patient agrees.  Patient somewhat emotional.  Patient able to make preferences known  "however his dysarthria and loss of function on his dominant hand make it challenging to know how much patient fully understands.  Recommended POLST form which was completed for DNR, comfort focused treatment, no artificial nutrition, and no IV fluids.  Patient requested that Eris to sign due to his problems with speech and inability to use his dominant hand.  At this time patient is okay continuing with IV fluids and other treatments without escalation of care while he remains in the hospital.  Obtain hospice choice as Eris needs to go home to get some rest as he works 11 PM to 7 AM at the [employer] 5 days a week.  I confirmed I would have the referral management team/hospice team reach out this morning versus this afternoon.  All questions answered and concerns addressed.    Code Status: Full; changed for DNR/DNI.     ACP Documents: Advance Directive on file reviewed: Healthcare agent is Eris Gomez.    50 minutes spent discussing advance care planning, this time excludes any other billed services.    Interval diagnostic studies and medical documentation entries pertinent to this case were reviewed independently by me. This patient has at least one acute or chronic illness or injury that poses a threat to life or bodily function. This patient suffers from a high risk of morbidly from additional invasive diagnostic testing or intensive treatment. Discussion of recommendations and coordination of care undertaken with primary provider/treatment team.      Mari \"Mulu\"ABDIAZIZ Parra, [facility]  Palliative Care Nurse Practitioner   Mon-Fri 8AM-5PM  [phone]      "

## 2024-04-30 NOTE — DISCHARGE PLANNING
Case Management Discharge Planning    Admission Date: 4/28/2024  GMLOS: 2.6  ALOS: 2    6-Clicks ADL Score: 11  6-Clicks Mobility Score: 8    Anticipated Discharge Dispo: Discharge Disposition: Discharged to home/self care (01)  Discharge Address: [address]  [place] [address]  Discharge Contact Phone Number: [phone] (Home Phone)    Pt discussed during morning rounds with provider.   Provider states pt and POA will be meeting with APRN from Palliative Care Team regarding discussion around goals of care, code status, and plan upon discharge.     LMSW to follow and provide assistance in discharge planning.     1100 Update  After discussion with Palliative APRN, pt and pts POA have decided to pursue hospice at home at this time.   LMSW to follow, Referral Mgmt Team to obtain choice.     1317 Update  LMSW following, [facility] chosen.     1615 Update  DC transport scheduled for 5/1/24 at 10am with [facility].    IMM - N/A

## 2024-04-30 NOTE — PROGRESS NOTES
Inpatient Anticoagulation Service Note for 4/30/2024    Reason for Anticoagulation: Stroke, Deep Vein Thrombosis   Target INR: 2.0 to 3.0     Hemoglobin Value: (!) 9.8  Hematocrit Value: (!) 28.3  Lab Platelet Value: (!) 126  Target INR: 2.0 to 3.0  INR from last 7 days       Date/Time INR Value    04/30/24 0536 2.66    04/29/24 0305  >/= 10.00     04/28/24 0832 7.67     Dose from last 7 days       Date/Time Dose (mg)    04/30/24  1.25    04/29/24  0    04/28/24  0     Assessment:   Home Dose: Per [facility], Warfarin 2.5 mg po every M/W/F and Warfarin 1.25 mg PO on all other days. (Recent decrease for INR of 4.3 in clinic on 4/23/24)   Time in therapeutic range outpatient: 74% reported   Established interaction (home meds): Statin, PPI   New/Acute potential interactions: No major DDIs identified.   Bridge Therapy: No   Reversal Agent Administered: Yes, Vitamin K 5 mg PO x one (4/29/24 AM).   Inpatient Diet: Regular easy to chew, 1:1 supervision. Albumin is normal.   CBC: Continued Leukopenia, anemia & thrombocytopenia; stable.   INR: Returns to therapeutic range. Anticipate INR will continue to trend downward and may take time to overcome vitamin K reversal.      Plan: Conservative dosing - resume RCC reduced regimen, Warfarin 1.25 mg tonight.     Education Material Provided?: No (Established [facility] patient)     Pharmacist suggested discharge dosing: Warfarin 2.5 mg po every M/W/F and Warfarin 1.25 mg PO on all other days     Cherie Garcia PharmD, BCPS    Addendum:   New start Dexamethasone 4 mg PO BID x 7 days per palliative care.   Steroids have the potential to either increase OR decrease the INR.   Pharmacist following daily

## 2024-04-30 NOTE — CARE PLAN
The patient is Stable - Low risk of patient condition declining or worsening    Shift Goals  Clinical Goals: Maintain skin integrity, safety, rest  Patient Goals: rest    Progress made toward(s) clinical / shift goals:  Pt skin assessed with 2 RN skin check. Wounds dressed per order, patient turned Q2 hrs when not refusing. Patient educated on the importance of turning and offloading pressure of bony prominences to aid in healing and preventing wounds. Pt with fall precautions in place, bed alarm verified on. Resting between interventions.     Problem: Pain - Standard  Goal: Alleviation of pain or a reduction in pain to the patient’s comfort goal  Description: Target End Date:  Prior to discharge or change in level of care    Document on Vitals flowsheet    1.  Document pain using the appropriate pain scale per order or unit policy  2.  Educate and implement non-pharmacologic comfort measures (i.e. relaxation, distraction, massage, cold/heat therapy, etc.)  3.  Pain management medications as ordered  4.  Reassess pain after pain med administration per policy  5.  If opiods administered assess patient's response to pain medication is appropriate per POSS sedation scale  6.  Follow pain management plan developed in collaboration with patient and interdisciplinary team (including palliative care or pain specialists if applicable)  Outcome: Progressing     Problem: Knowledge Deficit - Standard  Goal: Patient and family/care givers will demonstrate understanding of plan of care, disease process/condition, diagnostic tests and medications  Description: Target End Date:  1-3 days or as soon as patient condition allows    Document in Patient Education    1.  Patient and family/caregiver oriented to unit, equipment, visitation policy and means for communicating concern  2.  Complete/review Learning Assessment  3.  Assess knowledge level of disease process/condition, treatment plan, diagnostic tests and medications  4.   Explain disease process/condition, treatment plan, diagnostic tests and medications  Outcome: Progressing     Problem: Skin Integrity  Goal: Skin integrity is maintained or improved  Description: Target End Date:  Prior to discharge or change in level of care    Document interventions on Skin Risk/Jonathan flowsheet groups and corresponding LDA    1.  Assess and monitor skin integrity, appearance and/or temperature  2.  Assess risk factors for impaired skin integrity and/or pressures ulcers  3.  Implement precautions to protect skin integrity in collaboration with interdisciplinary team  4.  Implement pressure ulcer prevention protocol if at risk for skin breakdown  5.  Confirm wound care consult if at risk for skin breakdown  6.  Ensure patient use of pressure relieving devices  (Low air loss bed, waffle overlay, heel protectors, ROHO cushion, etc)  Outcome: Progressing     Problem: Fall Risk  Goal: Patient will remain free from falls  Description: Target End Date:  Prior to discharge or change in level of care    Document interventions on the Lin Butts Fall Risk Assessment    1.  Assess for fall risk factors  2.  Implement fall precautions  Outcome: Progressing       Patient is not progressing towards the following goals:

## 2024-04-30 NOTE — PROGRESS NOTES
Pt is A&O 4, responses delayed and slurred   Pain + declining PRN intervention at this time   - nausea  Tolerating a regular diet   Incision -  - Drains  + Voids  + flatus  + BM  Up max assist, tx to WC (baseline)  SCD's on LLE   Bed alarm on, pt high fall risk per daphnie padilla  Reviewed plan of care with patient, bed in lowest position and locked, pt resting comfortably now, call light within reach, all needs met at this time. Interventions will be executed per plan of care

## 2024-04-30 NOTE — PROGRESS NOTES
Palliative Care   Received messaged from patient's radiation oncologist Dr. Geoff Adams. Provided updated regarding goals of care discussions with patient and patient's agent. Dr. Adams discussed patient's cancer is treatable and requested we reach out to him prior to hospice discussions in future. Confirmed that I called his office 4/29. Discussed options were provided to patient and his POA including rehabilitative care with return to cancer treatment vs opting out of treatment and the risks involved (cancer progression). They have opted for hospice care and are pending evaluation/hospice acceptance. Encouraged him to reach out to patient and his healthcare agent to discuss his thoughts and recommendations and reach out to me if anything changes.     Updated attending physician Dr. Prince.     Mari Morocho, BABAK.P.R.N.  Palliative Care Nurse Practitioner  [phone]

## 2024-04-30 NOTE — PROGRESS NOTES
Lab called with questions regarding patients PT lab result of 28.8, which is significantly lower than yesterdays results. Lab informed of 5mg PO Phytonadione reversal that was administered to patient per MAR yesterday.

## 2024-04-30 NOTE — PROGRESS NOTES
4 Eyes Skin Assessment Completed by BENJAMIN Guzman and BENJAMIN Mcbride.    Head WDL  Ears WDL  Nose WDL  Mouth WDL  Neck WDL  Breast/Chest WDL  Shoulder Blades WDL  Spine WDL  (R) Arm/Elbow/Hand Redness, Blanching, and Bruising  (L) Arm/Elbow/Hand Redness and Blanching  Abdomen WDL  Groin Redness and Blanching  Scrotum/Coccyx/Buttocks Redness and Blanching, R buttock with scabbing, wound to L buttock ad rectal area with wound dressing applied.   (R) Leg Scab and Abrasion; scratching to thigh area   (L) Leg Redness, Scab(scratching to thigh) Bruising, Discoloration to calf area, mepilex in place over  (R) Heel/Foot/Toe BKA  (L) Heel/Foot/Toe Redness and Blanching          Devices In Places Pulse Ox and SCD's      Interventions In Place Heel Mepilex, TAP System, Pillows, Q2 Turns, Low Air Loss Mattress, Barrier Cream, Dri-Augie Pads, and Heels Loaded W/Pillows    Possible Skin Injury Yes    Pictures Uploaded Into Epic Yes  Wound Consult Placed Yes  RN Wound Prevention Protocol Ordered Yes

## 2024-04-30 NOTE — DISCHARGE PLANNING
DC Transport Scheduled    Transport Company Scheduled:  [employer]  Spoke with YNES at [facility]  to schedule transport.      Scheduled Date:5/1/2024  Scheduled Time: 1000    Destination: Home: [address] [place] [id number]     Notified care team of scheduled transport via Voalte.     If there are any changes needed to the DC transportation scheduled, please contact [facility] at ext. [phone] between the hours of 8897-1358 Mon-Fri. If outside those hours, contact the ED Case Manager at ext. [phone].

## 2024-05-01 ENCOUNTER — PHARMACY VISIT (OUTPATIENT)
Dept: PHARMACY | Facility: MEDICAL CENTER | Age: 69
End: 2024-05-01
Payer: COMMERCIAL

## 2024-05-01 ENCOUNTER — HOME CARE VISIT (OUTPATIENT)
Dept: HOSPICE | Facility: HOSPICE | Age: 69
End: 2024-05-01
Payer: MEDICARE

## 2024-05-01 VITALS
HEART RATE: 81 BPM | DIASTOLIC BLOOD PRESSURE: 79 MMHG | TEMPERATURE: 97.7 F | HEIGHT: 72 IN | OXYGEN SATURATION: 93 % | SYSTOLIC BLOOD PRESSURE: 147 MMHG | RESPIRATION RATE: 17 BRPM | BODY MASS INDEX: 20.32 KG/M2 | WEIGHT: 150 LBS

## 2024-05-01 DIAGNOSIS — G89.3 CANCER ASSOCIATED PAIN: ICD-10-CM

## 2024-05-01 DIAGNOSIS — C21.0 ANAL CANCER (HCC): Primary | ICD-10-CM

## 2024-05-01 DIAGNOSIS — Z51.5 HOSPICE CARE: ICD-10-CM

## 2024-05-01 DIAGNOSIS — C20 RECTAL CANCER (HCC): ICD-10-CM

## 2024-05-01 LAB
GAMMA INTERFERON BACKGROUND BLD IA-ACNC: 0.03 IU/ML
M TB IFN-G BLD-IMP: NEGATIVE
M TB IFN-G CD4+ BCKGRND COR BLD-ACNC: 0 IU/ML
MITOGEN IGNF BCKGRD COR BLD-ACNC: 6.27 IU/ML
QFT TB2 - NIL TBQ2: -0.01 IU/ML

## 2024-05-01 PROCEDURE — 99239 HOSP IP/OBS DSCHRG MGMT >30: CPT | Performed by: STUDENT IN AN ORGANIZED HEALTH CARE EDUCATION/TRAINING PROGRAM

## 2024-05-01 PROCEDURE — RXMED WILLOW AMBULATORY MEDICATION CHARGE: Performed by: STUDENT IN AN ORGANIZED HEALTH CARE EDUCATION/TRAINING PROGRAM

## 2024-05-01 RX ORDER — SENNA AND DOCUSATE SODIUM 50; 8.6 MG/1; MG/1
2 TABLET, FILM COATED ORAL 2 TIMES DAILY PRN
Qty: 28 TABLET | Refills: 10 | Status: SHIPPED | OUTPATIENT
Start: 2024-05-01 | End: 2024-05-05 | Stop reason: SDUPTHER

## 2024-05-01 RX ORDER — HALOPERIDOL 2 MG/ML
2-4 SOLUTION ORAL EVERY 4 HOURS PRN
Qty: 360 ML | Refills: 0 | Status: SHIPPED | OUTPATIENT
Start: 2024-05-01 | End: 2024-05-05 | Stop reason: SDUPTHER

## 2024-05-01 RX ORDER — CARBOXYMETHYLCELLULOSE SODIUM 5 MG/ML
1 SOLUTION/ DROPS OPHTHALMIC PRN
Qty: 15 ML | Refills: 0
Start: 2024-05-01 | End: 2024-05-01

## 2024-05-01 RX ORDER — OXYCODONE HYDROCHLORIDE AND ACETAMINOPHEN 5; 325 MG/1; MG/1
2 TABLET ORAL EVERY 6 HOURS
Qty: 56 TABLET | Refills: 0
Start: 2024-05-01 | End: 2024-05-01

## 2024-05-01 RX ORDER — ACETAMINOPHEN 650 MG/1
650 SUPPOSITORY RECTAL EVERY 6 HOURS PRN
Qty: 5 SUPPOSITORY | Refills: 10 | Status: SHIPPED | OUTPATIENT
Start: 2024-05-01 | End: 2024-05-05 | Stop reason: SDUPTHER

## 2024-05-01 RX ORDER — OXYCODONE HYDROCHLORIDE 5 MG/1
5 TABLET ORAL EVERY 4 HOURS PRN
Qty: 30 TABLET | Refills: 0 | Status: SHIPPED | OUTPATIENT
Start: 2024-05-01 | End: 2024-05-01

## 2024-05-01 RX ORDER — BISACODYL 10 MG
10 SUPPOSITORY, RECTAL RECTAL PRN
Qty: 5 SUPPOSITORY | Refills: 10 | Status: SHIPPED | OUTPATIENT
Start: 2024-05-01 | End: 2024-05-05 | Stop reason: SDUPTHER

## 2024-05-01 RX ORDER — OXYCODONE HYDROCHLORIDE AND ACETAMINOPHEN 5; 325 MG/1; MG/1
2 TABLET ORAL EVERY 6 HOURS
Qty: 56 TABLET | Refills: 0 | Status: SHIPPED | OUTPATIENT
Start: 2024-05-01 | End: 2024-05-01 | Stop reason: SDUPTHER

## 2024-05-01 RX ORDER — GABAPENTIN 300 MG/1
300 CAPSULE ORAL NIGHTLY
Qty: 90 CAPSULE | Refills: 0
Start: 2024-05-01 | End: 2024-05-01

## 2024-05-01 RX ORDER — ONDANSETRON 4 MG/1
4 TABLET, ORALLY DISINTEGRATING ORAL EVERY 6 HOURS PRN
Qty: 15 TABLET | Refills: 10 | Status: SHIPPED | OUTPATIENT
Start: 2024-05-01 | End: 2024-05-05 | Stop reason: SDUPTHER

## 2024-05-01 RX ORDER — DEXAMETHASONE 4 MG/1
4 TABLET ORAL 2 TIMES DAILY
Qty: 60 TABLET | Refills: 11 | Status: SHIPPED | OUTPATIENT
Start: 2024-05-01 | End: 2024-05-05 | Stop reason: SDUPTHER

## 2024-05-01 RX ORDER — GABAPENTIN 300 MG/1
300 CAPSULE ORAL NIGHTLY
Qty: 30 CAPSULE | Refills: 11 | Status: SHIPPED | OUTPATIENT
Start: 2024-05-01 | End: 2024-05-05 | Stop reason: SDUPTHER

## 2024-05-01 RX ORDER — ONDANSETRON 4 MG/1
4 TABLET, ORALLY DISINTEGRATING ORAL EVERY 4 HOURS PRN
Qty: 30 TABLET | Refills: 0
Start: 2024-05-01 | End: 2024-05-01

## 2024-05-01 RX ORDER — OXYCODONE HYDROCHLORIDE 5 MG/1
5 TABLET ORAL ONCE
Status: COMPLETED | OUTPATIENT
Start: 2024-05-01 | End: 2024-05-01

## 2024-05-01 RX ORDER — AMOXICILLIN 250 MG
2 CAPSULE ORAL EVERY EVENING
Qty: 60 TABLET | Refills: 0
Start: 2024-05-01 | End: 2024-05-01

## 2024-05-01 RX ORDER — DEXAMETHASONE 4 MG/1
4 TABLET ORAL 2 TIMES DAILY
Qty: 10 TABLET | Refills: 0
Start: 2024-05-01 | End: 2024-05-01 | Stop reason: SDUPTHER

## 2024-05-01 RX ORDER — LACTULOSE 20 G/30ML
10 SOLUTION ORAL
Qty: 450 ML | Refills: 0
Start: 2024-05-01 | End: 2024-05-01

## 2024-05-01 RX ORDER — HALOPERIDOL 2 MG/ML
1 SOLUTION ORAL
Qty: 30 ML | Refills: 0
Start: 2024-05-01 | End: 2024-05-01

## 2024-05-01 RX ORDER — OXYCODONE HYDROCHLORIDE AND ACETAMINOPHEN 5; 325 MG/1; MG/1
2 TABLET ORAL EVERY 6 HOURS
Qty: 480 TABLET | Refills: 0 | Status: SHIPPED | OUTPATIENT
Start: 2024-05-01 | End: 2024-05-05 | Stop reason: SDUPTHER

## 2024-05-01 RX ORDER — MORPHINE SULFATE 100 MG/5ML
10-20 SOLUTION ORAL
Qty: 240 ML | Refills: 0 | Status: SHIPPED | OUTPATIENT
Start: 2024-05-01 | End: 2024-05-05 | Stop reason: SDUPTHER

## 2024-05-01 RX ADMIN — DEXAMETHASONE 4 MG: 4 TABLET ORAL at 08:57

## 2024-05-01 RX ADMIN — OXYCODONE 5 MG: 5 TABLET ORAL at 09:31

## 2024-05-01 RX ADMIN — OXYCODONE AND ACETAMINOPHEN 2 TABLET: 5; 325 TABLET ORAL at 00:34

## 2024-05-01 RX ADMIN — OXYCODONE AND ACETAMINOPHEN 2 TABLET: 5; 325 TABLET ORAL at 05:39

## 2024-05-01 SDOH — ECONOMIC STABILITY: GENERAL

## 2024-05-01 ASSESSMENT — PAIN DESCRIPTION - PAIN TYPE
TYPE: ACUTE PAIN

## 2024-05-01 ASSESSMENT — ACTIVITIES OF DAILY LIVING (ADL)
BATHING_REQUIRES_ASSISTANCE: 1
MONEY MANAGEMENT (EXPENSES/BILLS): TOTALLY DEPENDENT
DRESSING_REQUIRES_ASSISTANCE: 1

## 2024-05-01 ASSESSMENT — ENCOUNTER SYMPTOMS
CONSTIPATION: 1
LAST BOWEL MOVEMENT: 66960
SLEEP QUALITY: FAIR

## 2024-05-01 ASSESSMENT — SOCIAL DETERMINANTS OF HEALTH (SDOH): ACTIVE STRESSOR - HEALTH CHANGES: 1

## 2024-05-01 NOTE — PROGRESS NOTES
4 Eyes Skin Assessment Completed by BENJAMIN Jackson and BENJAMIN Kaba.    Head WDL  Ears WDL  Nose WDL  Mouth WDL  Neck WDL  Breast/Chest WDL  Shoulder Blades WDL  Spine Redness and Blanching  (R) Arm/Elbow/Hand Redness, Blanching, Abrasion to elbow  (L) Arm/Elbow/Hand Redness, Blanching, and Bruising, Scattered abrasions  Abdomen WDL  Groin WDL  Scrotum/Coccyx/Buttocks Redness, Blanching, Wound to L buttock - red, purple, pink - dressing changed and in place. Scabbing to R buttock  (R) Leg BKE, old scar.  (L) Leg Scattered abrasions, scratches to thigh  (R) Heel/Foot/Toe BKA  (L) Heel/Foot/Toe WDL          Devices In Places Pulse Ox and SCD's      Interventions In Place Heel Mepilex, TAP System, Pillows, Q2 Turns, Low Air Loss Mattress, Barrier Cream, Dri-Augie Pads, and Heels Loaded W/Pillows    Possible Skin Injury Yes    Pictures Uploaded Into Epic Yes  Wound Consult Placed Yes  RN Wound Prevention Protocol Ordered Yes

## 2024-05-01 NOTE — PROGRESS NOTES
Assumed care of patient at 1900. Bedside report received. Assessment complete.    A&O x3. Patient calls appropriately.  Reports 1/10 pain. Pain managed with prescribed medications per MAR.  Denies CP/SOB.   Tolerating regular diet. Denies N/V.  + void. + flatus. + BM. Last BM 4/30.  Skin per flowsheets  R BKA. L UE flaccid  Mobility: wheelchair bound  SCD on LLE    All needs met at this time. Call light and belongings within reach. Fall precautions in place. Hourly rounding in place.   [id number]

## 2024-05-01 NOTE — ASSESSMENT & PLAN NOTE
Due to xeloda  Oncology consulted, holding further antineoplastic therapy  No further testing for planned hospice enrollment

## 2024-05-01 NOTE — CARE PLAN
Problem: Pain - Standard  Goal: Alleviation of pain or a reduction in pain to the patient’s comfort goal  Outcome: Progressing     Problem: Knowledge Deficit - Standard  Goal: Patient and family/care givers will demonstrate understanding of plan of care, disease process/condition, diagnostic tests and medications  Outcome: Progressing     Problem: Skin Integrity  Goal: Skin integrity is maintained or improved  Outcome: Progressing   The patient is Stable - Low risk of patient condition declining or worsening    Shift Goals  Clinical Goals: skin integrity, safety, pain control  Patient Goals: rest, comfort    Progress made toward(s) clinical / shift goals:  Pain managed per MAR with reported relief. Patient free from falls or injury at this time. Buttock wound cleansed and dressing changed per protocol.    Patient is not progressing towards the following goals:

## 2024-05-01 NOTE — DISCHARGE INSTRUCTIONS
Discharge Instructions per Huy Prince M.D.    You were hospitalized for recurrent pain and worsening after treatment for anal cancer (squamous cell carcinoma). You have opted for Hospice care to focus on remaining quality of life.    DIET: Regular    ACTIVITY: Regular    DIAGNOSIS: Anal Squamous Cell Carcinoma    Call the hospice agency for any needs, questions, or concerns. Do NOT call 911 or go to Emergency Department unless directed by the Hospice agency.

## 2024-05-01 NOTE — DISCHARGE SUMMARY
Discharge Summary    CHIEF COMPLAINT ON ADMISSION  Chief Complaint   Patient presents with    Abdominal Pain     X 1 month  Lower abdominals both sides  Pt denies urinary changes but endorses BM 'changes when eating different foods'    T-5000 GLF     Pt bib ems from home sp glf while transferring from toilet to wheelchair 3 hours ago  + thinners  -Headstrike  -LOC       Reason for Admission  Uncontrolled Cancer Pain, Failure to Thrive     Admission Date  4/28/2024    CODE STATUS  DNAR/DNI    HPI & HOSPITAL COURSE    Drew Melton is a 68 y.o. 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      He reported severe pain in rectum, as well as lower abdomen, reported a ground-level fall.  He indicated poor oral intake from food aversion due to dyschezia.  In ER, noted to have potassium of 2.7, no acute findings on KUB.  Medical oncology was consulted, recommended palliative care evaluation and holding xeloda due to cytopenias and diarrhea. He and his POA Eris met with palliative care and opted for enrollment in hospice for focus on remaining QOL and not prolonging life. He was advised by his radiation oncologist and medical oncologist that his stage III disease was still treatable and potentially curable, for which he opted for hospice instead.    Therefore, he is discharged in guarded and stable condition to hospice.    The patient met 2-midnight criteria for an inpatient stay at the time of discharge.    Discharge Date  5/1/2024    FOLLOW UP ITEMS POST DISCHARGE  Hospice    DISCHARGE DIAGNOSES  Principal Problem:    Anal cancer (HCC) (POA: Yes)  Active Problems:    Cognitive deficit, post-stroke (POA: Yes)      Overview: ICD-10 transition    Hemiplegia of dominant side as late effect  following cerebrovascular disease (HCC) (POA: Yes)      Overview: Consequence of ICH, remote    Dyslipidemia (high LDL; low HDL) (Chronic) (POA: Yes)    S/P BKA (below knee amputation) unilateral (HCC) (POA: Yes)    Cancer associated pain (POA: Yes)    Hypokalemia (POA: Unknown)    Dehydration (POA: Yes)    Supratherapeutic INR (POA: Yes)    Leukopenia due to antineoplastic chemotherapy (HCC) (POA: Yes)    Normocytic anemia (POA: Yes)  Resolved Problems:    Rectal cancer (HCC) (POA: Yes)      FOLLOW UP  Future Appointments   Date Time Provider Department Center   5/1/2024 10:15 AM Cathie Reeder R.N. [facility] None     [facility]  [address]  [place] [id number]  [phone]  Call  As needed      MEDICATIONS ON DISCHARGE     Medication List        START taking these medications        Instructions   carboxymethylcellulose 0.5 % Soln  Commonly known as: Refresh Tears   Administer 1 Drop into both eyes as needed (dry eyes).  Dose: 1 Drop     dexamethasone 4 MG Tabs  Commonly known as: Decadron   Take 1 Tablet by mouth 2 times a day.  Dose: 4 mg     gabapentin 300 MG Caps  Commonly known as: Neurontin   Take 1 Capsule by mouth every evening.  Dose: 300 mg     haloperidol 2 MG/ML Conc  Commonly known as: Haldol   Take 0.5 mL by mouth every 2 hours as needed (Agitation/Delirium).  Dose: 1 mg     lactulose 20 GM/30ML Soln   Take 15 mL by mouth 1 time a day as needed (constipation).  Dose: 10 g     ondansetron 4 MG Tbdp  Commonly known as: Zofran ODT   Take 1 Tablet by mouth every four hours as needed for Nausea/Vomiting (give PO if IV route is unavailable.).  Dose: 4 mg     oxyCODONE-acetaminophen 5-325 MG Tabs  Commonly known as: Percocet   Take 2 Tablets by mouth every 6 hours for 7 days.  Dose: 2 Tablet     senna-docusate 8.6-50 MG Tabs  Commonly known as: Pericolace Or Senokot S   Take 2 Tablets by mouth every evening.  Dose: 2 Tablet            CONTINUE taking these medications         Instructions   oxyCODONE immediate-release 5 MG Tabs  Commonly known as: Roxicodone   Take 5 mg by mouth every four hours as needed for Severe Pain.  Dose: 5 mg     warfarin 2.5 MG Tabs  Commonly known as: Coumadin   Take 1.25-2.5 mg by mouth every day. Pt takes 1.25 mg on odd days and 2.5 mg on even days  Dose: 1.25-2.5 mg            STOP taking these medications      capecitabine 500 MG tablet  Commonly known as: Xeloda     docusate sodium 100 MG Caps  Commonly known as: Colace              Allergies  No Known Allergies    DIET  Orders Placed This Encounter   Procedures    Diet Order Diet: Regular (easy to chew); Miscellaneous modifications: (optional): Finger Foods ; Tray Modifications (optional): SLP - 1:1 Supervision by Nursing     Standing Status:   Standing     Number of Occurrences:   1     Order Specific Question:   Diet:     Answer:   Regular [1]     Comments:   easy to chew     Order Specific Question:   Miscellaneous modifications: (optional)     Answer:   Finger Foods  [2]     Order Specific Question:   Tray Modifications (optional)     Answer:   SLP - 1:1 Supervision by Nursing       ACTIVITY  As tolerated.  Weight bearing as tolerated    CONSULTATIONS  Medical Oncology  Palliative Care    PROCEDURES  None    LABORATORY  Lab Results   Component Value Date    SODIUM 143 04/30/2024    POTASSIUM 5.0 04/30/2024    CHLORIDE 113 (H) 04/30/2024    CO2 21 04/30/2024    GLUCOSE 122 (H) 04/30/2024    BUN 10 04/30/2024    CREATININE 0.52 04/30/2024        Lab Results   Component Value Date    WBC 2.8 (L) 04/30/2024    HEMOGLOBIN 9.8 (L) 04/30/2024    HEMATOCRIT 28.3 (L) 04/30/2024    PLATELETCT 126 (L) 04/30/2024        Total time of the discharge process exceeds 33 minutes.

## 2024-05-02 ENCOUNTER — HOME CARE VISIT (OUTPATIENT)
Dept: HOSPICE | Facility: HOSPICE | Age: 69
End: 2024-05-02
Payer: MEDICARE

## 2024-05-02 VITALS — SYSTOLIC BLOOD PRESSURE: 128 MMHG | HEART RATE: 80 BPM | DIASTOLIC BLOOD PRESSURE: 76 MMHG | RESPIRATION RATE: 20 BRPM

## 2024-05-02 VITALS — HEART RATE: 80 BPM | RESPIRATION RATE: 20 BRPM | DIASTOLIC BLOOD PRESSURE: 64 MMHG | SYSTOLIC BLOOD PRESSURE: 122 MMHG

## 2024-05-02 ASSESSMENT — ENCOUNTER SYMPTOMS
PAIN LOCATION - PAIN QUALITY: SHARP
PAIN SEVERITY GOAL: 2/10
PAIN SEVERITY GOAL: 2/10
PAIN LOCATION - PAIN SEVERITY: 5/10
PAIN: 1
DYSPNEA ON EXERTION: 1
PAIN LOCATION: BUTT
PAIN: 1
LOWEST PAIN SEVERITY IN PAST 24 HOURS: 0/10
FATIGUE: 1
HIGHEST PAIN SEVERITY IN PAST 24 HOURS: 7/10
MUSCLE WEAKNESS: 1
FATIGUES EASILY: 1
SUBJECTIVE PAIN PROGRESSION: WAXING AND WANING
SKIN LESIONS: 1
SHORTNESS OF BREATH: 1
FATIGUES EASILY: 1
PERSON REPORTING PAIN: PATIENT
HIGHEST PAIN SEVERITY IN PAST 24 HOURS: 7/10
SHORTNESS OF BREATH: 1
SUBJECTIVE PAIN PROGRESSION: WAXING AND WANING
PERSON REPORTING PAIN: PATIENT
PAIN LOCATION - PAIN FREQUENCY: INTERMITTENT
SKIN LESIONS: 1
LOWEST PAIN SEVERITY IN PAST 24 HOURS: 2/10
SLEEP QUALITY: FAIR

## 2024-05-02 ASSESSMENT — SOCIAL DETERMINANTS OF HEALTH (SDOH): ACTIVE STRESSOR - HEALTH CHANGES: 1

## 2024-05-02 ASSESSMENT — PATIENT HEALTH QUESTIONNAIRE - PHQ9: CLINICAL INTERPRETATION OF PHQ2 SCORE: 0

## 2024-05-03 ENCOUNTER — HOME CARE VISIT (OUTPATIENT)
Dept: HOSPICE | Facility: HOSPICE | Age: 69
End: 2024-05-03
Payer: MEDICARE

## 2024-05-04 ENCOUNTER — APPOINTMENT (OUTPATIENT)
Dept: RADIOLOGY | Facility: MEDICAL CENTER | Age: 69
End: 2024-05-04
Attending: STUDENT IN AN ORGANIZED HEALTH CARE EDUCATION/TRAINING PROGRAM
Payer: MEDICARE

## 2024-05-04 ENCOUNTER — HOSPICE ADMISSION (OUTPATIENT)
Dept: HOSPICE | Facility: HOSPICE | Age: 69
End: 2024-05-04
Payer: MEDICARE

## 2024-05-04 ENCOUNTER — HOSPITAL ENCOUNTER (EMERGENCY)
Facility: MEDICAL CENTER | Age: 69
End: 2024-05-04
Attending: STUDENT IN AN ORGANIZED HEALTH CARE EDUCATION/TRAINING PROGRAM
Payer: MEDICARE

## 2024-05-04 ENCOUNTER — HOME CARE VISIT (OUTPATIENT)
Dept: HOSPICE | Facility: HOSPICE | Age: 69
End: 2024-05-04

## 2024-05-04 ENCOUNTER — HOME CARE VISIT (OUTPATIENT)
Dept: HOSPICE | Facility: HOSPICE | Age: 69
End: 2024-05-04
Payer: MEDICARE

## 2024-05-04 VITALS
OXYGEN SATURATION: 94 % | HEART RATE: 82 BPM | RESPIRATION RATE: 18 BRPM | HEIGHT: 72 IN | BODY MASS INDEX: 20.32 KG/M2 | SYSTOLIC BLOOD PRESSURE: 132 MMHG | WEIGHT: 150 LBS | DIASTOLIC BLOOD PRESSURE: 74 MMHG | TEMPERATURE: 97.5 F

## 2024-05-04 DIAGNOSIS — S06.5XAA SUBDURAL HEMATOMA (HCC): ICD-10-CM
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
## 2024-05-04 DIAGNOSIS — Z51.5 COMFORT MEASURES ONLY STATUS: ICD-10-CM

## 2024-05-04 DIAGNOSIS — R09.02 HYPOXIA: ICD-10-CM

## 2024-05-04 LAB
D DIMER PPP IA.FEU-MCNC: 1.18 UG/ML (FEU) (ref 0–0.5)
EKG IMPRESSION: NORMAL

## 2024-05-04 ASSESSMENT — FIBROSIS 4 INDEX: FIB4 SCORE: 2.51

## 2024-05-04 NOTE — ED NOTES
Pt given meal try at bedside.   States does not want it and wants it removed. Done.  States doesn't want to eat because he doesn't want to poop because pooping hurts. This RN asked pt if wants stool softener, pt states no.    Warmed coffee for patient and given ice water.

## 2024-05-04 NOTE — DISCHARGE PLANNING
MSW received message from Celi with [facility]. [employer] is running behind on DME delivery. Will updated MSW when its delivered.      Celi messaged MSW and stated pt's DME is going to be delivered soon. MSW arranged transport with pt GMT for [id number]. Res [id number]. Cost $152. MSW updated Celi and bedside RN.

## 2024-05-04 NOTE — ED NOTES
Med rec completed per [facility] hospice note and [facility] pharmacy dispense report

## 2024-05-04 NOTE — ED NOTES
Bedside report received from off going RN/tech: Eva, assumed care of patient.  POC discussed with patient. Call light within reach, all needs addressed at this time.       Fall risk interventions in place: Move the patient closer to the nurse's station, Patient's personal possessions are with in their safe reach, Place socks on patient, Place fall risk sign on patient's door, Give patient urinal if applicable, Keep floor surfaces clean and dry, and Accompanied to restroom (all applicable per [place] Fall risk assessment)   Continuous monitoring: Pulse Ox or Blood Pressure  IVF/IV medications: Not Applicable   Oxygen: How many liters 2L  Bedside sitter: Not Applicable   Isolation: Not Applicable    Pt continues to rest with POA at bedside with stable vitals on 2L NC. Call light continues to be within reach.

## 2024-05-04 NOTE — ED NOTES
Resource RN:    Pt sitting up on gurney with all safety precautions in place. DEJUAN Schulte at bedside.     Notified to use call light if they have any needs

## 2024-05-04 NOTE — DISCHARGE PLANNING
MSW spoke to Ruddy with [facility]. He will get talk to Jalyn regarding pt and let MSW know plan to get pt re-established on hospice.     MSW spoke to Celi with [facility]. She is ordering pt's DME and will call MSW when pt can d/c home. MSW spoke to Celi with [facility] and she is planning for [employer] to deliver pt's concentrator before 2pm today. MSW,  Celi and pt's DEJUAN all spoke together regarding plan. Will arrange transport home for pt once DME is delivered.

## 2024-05-04 NOTE — DISCHARGE PLANNING
Medical Social Work     The pt is on service with [facility], the pt and family are wanting to go home and be placed back on hospice. The pt is now on 2 LPM, MATTHIAS called the on call RN for [facility] and spoke with Jalyn. Jalyn advised SW that they will come to [facility] in the morning and sign th pt back up for hospice and get the O2 the ERP is requesting. Per Jalyn, RN she advised MATTHIAS that we call at around 8:15am to check in with them about this pt and that they should be coming to [facility] to do the re admit to hospice and to set up transport for the pt to get home.     [facility]: [phone]

## 2024-05-04 NOTE — ED NOTES
Pt on call light requesting assistance to use urinal. Pt unable to use urinal, states feels like he has to pee but unable to.  ERP notified.   Bladder scanned. 215 mL noted in bladder.

## 2024-05-04 NOTE — ED NOTES
Pt helped onto toilet where he successfully urinated.  Helped back into wheelchair where he's connected to oxygen on carrier so he's able to wheel himself around room.

## 2024-05-04 NOTE — ED NOTES
Pt updated with POC. Pt verbalized understanding.  Pt preferred to stay on his wheelchair.  Pt maintained on oxygen at 2 lpm via nasal cannula.

## 2024-05-04 NOTE — ED NOTES
Pt provided lunch tray.  Pt doesn't want to eat for now.  Pt updated with POC.    Denied any new complaints. No current needs identified.  Maco in low position, side rail up for pt safety. Call light within reach.

## 2024-05-04 NOTE — ED NOTES
Bedside report received from off going RN Hollie, assumed care of patient.  POC discussed with patient. Call light within reach, all needs addressed at this time.       Fall risk interventions in place: Move the patient closer to the nurse's station, Patient's personal possessions are with in their safe reach, Place fall risk sign on patient's door, Give patient urinal if applicable, and Keep floor surfaces clean and dry (all applicable per [place] Fall risk assessment)   Continuous monitoring: Pulse Ox or Blood Pressure  IVF/IV medications: Not Applicable   Oxygen: How many liters 2L  Bedside sitter: Not Applicable   Isolation: Not Applicable
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
## 2024-05-04 NOTE — ED NOTES
Informed CM Celi re: details of Pt room air saturation and with Oxygen consumption.    Per CM still waiting for his oxygen to be deliver at home.

## 2024-05-04 NOTE — FACE TO FACE
"Face to Face Note  -  Durable Medical Equipment    Humberto Bagley M.D. - NPI: [id number]  I certify that this patient is under my care and that they had a durable medical equipment(DME)face to face encounter by myself that meets the physician DME face-to-face encounter requirements with this patient on:    Date of encounter:   Patient:                    MRN:                       YOB: 2023  Drew Melton  [id number]  1955     The encounter with the patient was in whole, or in part, for the following medical condition, which is the primary reason for durable medical equipment:  Other - Hypoxia    I certify that, based on my findings, the following durable medical equipment is medically necessary:    Oxygen   HOME O2 Saturation Measurements:(Values must be present for Home Oxygen orders)  Room air sat at rest: 83%  Room air sat with amb: below knee amputated  With liters of O2: 2, O2 sat at rest with O2: 94%  With Liters of O2: NA, O2 sat with amb with O2 : below knee amputated  Is the patient mobile?: No  If patient feels more short of breath, they can go up to 6 liters per minute and contact healthcare provider.    Supporting Symptoms: The patient requires supplemental oxygen, as the following interventions have been tried with limited or no improvement: \"Ambulation with oximetry.    My Clinical findings support the need for the above equipment due to:  Hypoxia  "

## 2024-05-04 NOTE — ED NOTES
SW contacted and came to bedside. Working on getting pt reestablished with hospice and coordinating oxygen delivery. Pt notified.

## 2024-05-04 NOTE — PROGRESS NOTES
ED Observation Progress Note    Date of Service: 24    Interval History and Interventions  Patient was initially evaluated overnight after he fell off his toilet and could not get back up.  Workup was initially started prior to the patient's partner and POA arrival.  The patient was recently started on hospice.  Both the patient and his POA confirmed that he did not want any further treatment, admission to the hospital, and requested to return home with reinitiation of hospice.  He was found to need oxygen.  Orders were placed.  We are awaiting oxygen to be delivered at which time he will be discharged home to resume hospice.    Physical Exam  BP (!) 171/78   Pulse 75   Temp 36.4 °C (97.5 °F) (Temporal)   Resp 17   Ht 1.829 m (6')   Wt 68 kg (150 lb)   SpO2 (!) 83%   BMI 20.34 kg/m² .    Constitutional: Awake and alert. Nontoxic  HENT:  Grossly normal  Eyes: Grossly normal  Neck: Normal range of motion  Cardiovascular: Normal heart rate   Thorax & Lungs: No respiratory distress  Abdomen: Nontender  Skin:  No pathologic rash.   Psychiatric: Calm.    Labs  Results for orders placed or performed during the hospital encounter of 24   D-DIMER   Result Value Ref Range    D-Dimer 1.18 (H) 0.00 - 0.50 ug/mL (FEU)   EKG (NOW)   Result Value Ref Range    Report       [facility]    Test Date:  2024  Pt Name:    GLADYS BARRAZA                Department: ER  MRN:        [id number]                      Room:       [facility]  Gender:     Male                         Technician: [id number]  :        1955                   Requested By:JEREMY MEDINA  Order #:    [id number]                    Reading MD:    Measurements  Intervals                                Axis  Rate:       96                           P:          76  TX:         139                          QRS:        55  QRSD:       104                          T:          105  QT:         355  QTc:         449    Interpretive Statements  Sinus rhythm  Nonspecific T abnormalities, lateral leads  Compared to ECG 01/19/2024 14:50:56  T-wave abnormality now present  Early repolarization no longer present         Radiology  CT-HEAD W/O   Final Result         1.  11.7 mm mixed density left holohemispheric subdural hematoma, appears predominantly subacute with minimal hyperdense component compatible with minimal acute hemorrhage.   2.  Nonspecific white matter changes, commonly associated with small vessel ischemic disease.  Associated mild cerebral atrophy is noted.   3.  Atherosclerosis.      These findings were discussed with the patient's clinician, Mikhail Jones, on 5/4/2024 2:18 AM.      DX-CHEST-PORTABLE (1 VIEW)   Final Result         1.  Left basilar atelectasis, no focal infiltrate          Problem List  1. Fall, initial encounter        2. Comfort measures only status        3. Subdural hematoma (HCC)        4. Hypoxia  DME O2 New Set Up        Electronically signed by: Humberto Bagley M.D., 5/4/2024 1:56 PM

## 2024-05-04 NOTE — ED NOTES
Message received from Celi with hospice asking for order for home oxygen to be established.   Report given to oncoming RN. Notified about necessity for order and working with hospice RN to get oxygen tank delivered prior to going home.   Celi notified about Ulysses RN taking over and requesting order as needed.

## 2024-05-04 NOTE — DISCHARGE INSTRUCTIONS
Drew should coordinate with his hospice organization for additional at home services and also follow-up with his primary care provider.

## 2024-05-04 NOTE — ED TRIAGE NOTES
Chief Complaint   Patient presents with    Fall     Pt fell off toilet today, -head strike, -LOC, -thinners. C/o low back pain. H/o falls        BIB [facility] from home to G27, pt on monitor and in gown. Pt consists of: above complaint, pt A&Ox4, placed on 2L O2 NC, spo2 high 80s on room air. 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 Has POLST at bedside. Pt POA wanting pt to check in d/t falls.     Medications given en route:1g APAP     /78   Pulse (!) 116   Temp 37.3 °C (99.1 °F) (Temporal)   Resp 15   Ht 1.829 m (6')   Wt 68 kg (150 lb)   SpO2 89%   BMI 20.34 kg/m²

## 2024-05-05 ENCOUNTER — HOME CARE VISIT (OUTPATIENT)
Dept: HOSPICE | Facility: HOSPICE | Age: 69
End: 2024-05-05

## 2024-05-05 VITALS — RESPIRATION RATE: 16 BRPM | HEART RATE: 76 BPM

## 2024-05-05 DIAGNOSIS — C21.0 ANAL CANCER (HCC): ICD-10-CM

## 2024-05-05 DIAGNOSIS — G89.3 CANCER ASSOCIATED PAIN: ICD-10-CM

## 2024-05-05 DIAGNOSIS — Z51.5 HOSPICE CARE: ICD-10-CM

## 2024-05-05 PROCEDURE — 665036 HSPC NOTICE OF ELECTION NOE

## 2024-05-05 PROCEDURE — S9126 HOSPICE CARE, IN THE HOME, P: HCPCS

## 2024-05-05 PROCEDURE — G0299 HHS/HOSPICE OF RN EA 15 MIN: HCPCS

## 2024-05-05 RX ORDER — MORPHINE SULFATE 100 MG/5ML
10-20 SOLUTION ORAL
Qty: 240 ML | Refills: 0 | Status: SHIPPED | OUTPATIENT
Start: 2024-05-05 | End: 2025-05-05

## 2024-05-05 RX ORDER — ACETAMINOPHEN 650 MG/1
650 SUPPOSITORY RECTAL EVERY 6 HOURS PRN
Qty: 5 SUPPOSITORY | Refills: 10 | Status: SHIPPED | OUTPATIENT
Start: 2024-05-05 | End: 2025-05-05

## 2024-05-05 RX ORDER — HALOPERIDOL 2 MG/ML
2-4 SOLUTION ORAL EVERY 4 HOURS PRN
Qty: 360 ML | Refills: 0 | Status: SHIPPED | OUTPATIENT
Start: 2024-05-05 | End: 2025-05-05

## 2024-05-05 RX ORDER — DEXAMETHASONE 4 MG/1
4 TABLET ORAL 2 TIMES DAILY
Qty: 60 TABLET | Refills: 11 | Status: SHIPPED | OUTPATIENT
Start: 2024-05-05 | End: 2025-05-05

## 2024-05-05 RX ORDER — GABAPENTIN 300 MG/1
300 CAPSULE ORAL NIGHTLY
Qty: 30 CAPSULE | Refills: 11 | Status: SHIPPED | OUTPATIENT
Start: 2024-05-05 | End: 2025-05-05

## 2024-05-05 RX ORDER — ONDANSETRON 4 MG/1
4 TABLET, ORALLY DISINTEGRATING ORAL EVERY 6 HOURS PRN
Qty: 15 TABLET | Refills: 10 | Status: SHIPPED | OUTPATIENT
Start: 2024-05-05 | End: 2025-05-05

## 2024-05-05 RX ORDER — SENNA AND DOCUSATE SODIUM 50; 8.6 MG/1; MG/1
2 TABLET, FILM COATED ORAL 2 TIMES DAILY PRN
Qty: 28 TABLET | Refills: 10 | Status: SHIPPED | OUTPATIENT
Start: 2024-05-05 | End: 2024-05-06 | Stop reason: SDUPTHER

## 2024-05-05 RX ORDER — OXYCODONE HYDROCHLORIDE AND ACETAMINOPHEN 5; 325 MG/1; MG/1
2 TABLET ORAL EVERY 6 HOURS
Qty: 480 TABLET | Refills: 0 | Status: SHIPPED | OUTPATIENT
Start: 2024-05-05 | End: 2025-05-05

## 2024-05-05 RX ORDER — BISACODYL 10 MG
10 SUPPOSITORY, RECTAL RECTAL PRN
Qty: 5 SUPPOSITORY | Refills: 10 | Status: SHIPPED | OUTPATIENT
Start: 2024-05-05 | End: 2025-05-05

## 2024-05-05 SDOH — ECONOMIC STABILITY: GENERAL

## 2024-05-05 SDOH — ECONOMIC STABILITY: HOUSING INSECURITY: EVIDENCE OF SMOKING MATERIAL: 0

## 2024-05-05 ASSESSMENT — ENCOUNTER SYMPTOMS
PAIN LOCATION - RELIEVING FACTORS: PAIN MEDICATION
LIMITED RANGE OF MOTION: 1
SHORTNESS OF BREATH: 1
PAIN LOCATION - PAIN SEVERITY: 4/10
MUSCLE WEAKNESS: 1
CONSTIPATION: 1
PAIN LOCATION - PAIN QUALITY: ACHE
FATIGUES EASILY: 1
FATIGUE: 1
SHORTNESS OF BREATH: 1
LAST BOWEL MOVEMENT: 66964
PERSON REPORTING PAIN: PATIENT
PAIN LOCATION: RECTUM
STOOL FREQUENCY: LESS THAN DAILY
HIGHEST PAIN SEVERITY IN PAST 24 HOURS: 4/10
PAIN: 1
SLEEP QUALITY: ADEQUATE
SKIN LESIONS: 1
PAIN LOCATION - PAIN FREQUENCY: CONSTANT
SUBJECTIVE PAIN PROGRESSION: UNCHANGED
LOWEST PAIN SEVERITY IN PAST 24 HOURS: 2/10
PAIN SEVERITY GOAL: 3/10

## 2024-05-05 ASSESSMENT — ACTIVITIES OF DAILY LIVING (ADL)
MONEY MANAGEMENT (EXPENSES/BILLS): TOTALLY DEPENDENT
AMBULATION_REQUIRES_ASSISTANCE: 1
DRESSING_REQUIRES_ASSISTANCE: 1
BATHING_REQUIRES_ASSISTANCE: 1
AMBULATION ASSISTANCE: NON-AMBULATORY

## 2024-05-05 ASSESSMENT — PATIENT HEALTH QUESTIONNAIRE - PHQ9: CLINICAL INTERPRETATION OF PHQ2 SCORE: 0

## 2024-05-05 NOTE — PROGRESS NOTES
Patient discussed with Celi Crowley RN. Patient re-admitting to hospice after going to the ER s/p fall. No medication changes at this time.

## 2024-05-05 NOTE — DISCHARGE PLANNING
MSW spoke to Ninfa at [facility]. Their  is running behind. She has one transport ahead then will be back to get pt. MSW updated bedside RN and Hospice Rn Celi.

## 2024-05-05 NOTE — ED NOTES
Pt assisted to bathroom need 2 person assist.    Pt keep safe and comfortable to bed after bathroom.

## 2024-05-05 NOTE — ED NOTES
Pt discharged to home. Pt was given follow up instructions. Pt verbalized understanding of all instructions for discharge, pt taken home via [facility]. AOx4

## 2024-05-05 NOTE — ED NOTES
Bedside report received from off going  RN miryam,assumed care of patient.  POC discussed with patient. Call light within reach, all needs addressed at this time.       Fall risk interventions in place: Patient Refuses (all applicable per [place] Fall risk assessment)   Continuous monitoring: Not Applicable   IVF/IV medications: Not Applicable   Oxygen: How many liters 2L  Bedside sitter: Not Applicable   Isolation: Not Applicable

## 2024-05-05 NOTE — ED NOTES
Assisted PT from bathroom to wheel chair. PT currently sitting in his wheel chair in his room per his request. Call light within reach. Awaiting [facility] transport.

## 2024-05-06 ENCOUNTER — HOME CARE VISIT (OUTPATIENT)
Dept: HOSPICE | Facility: HOSPICE | Age: 69
End: 2024-05-06

## 2024-05-06 VITALS — HEART RATE: 100 BPM | RESPIRATION RATE: 14 BRPM

## 2024-05-06 DIAGNOSIS — G89.3 CANCER ASSOCIATED PAIN: ICD-10-CM

## 2024-05-06 DIAGNOSIS — C21.0 ANAL CANCER (HCC): ICD-10-CM

## 2024-05-06 DIAGNOSIS — Z51.5 HOSPICE CARE: ICD-10-CM

## 2024-05-06 PROCEDURE — RXMED WILLOW AMBULATORY MEDICATION CHARGE: Performed by: REHABILITATION PRACTITIONER

## 2024-05-06 PROCEDURE — S9126 HOSPICE CARE, IN THE HOME, P: HCPCS

## 2024-05-06 RX ORDER — SENNA AND DOCUSATE SODIUM 50; 8.6 MG/1; MG/1
TABLET, FILM COATED ORAL
Qty: 120 TABLET | Refills: 23 | Status: SHIPPED | OUTPATIENT
Start: 2024-05-06 | End: 2025-05-06

## 2024-05-06 RX ORDER — POLYETHYLENE GLYCOL 3350 17 G/17G
17 POWDER, FOR SOLUTION ORAL DAILY
Qty: 119 G | Refills: 23 | Status: SHIPPED | OUTPATIENT
Start: 2024-05-06 | End: 2025-05-06

## 2024-05-06 SDOH — ECONOMIC STABILITY: HOUSING INSECURITY: EVIDENCE OF SMOKING MATERIAL: 0

## 2024-05-06 ASSESSMENT — ENCOUNTER SYMPTOMS
PAIN LOCATION: SACRUM
SLEEP QUALITY: ADEQUATE
SKIN LESIONS: 1
PAIN LOCATION - EXACERBATING FACTORS: PRESSURE
SHORTNESS OF BREATH: 1
LIMITED RANGE OF MOTION: 1
STOOL FREQUENCY: LESS THAN DAILY
PAIN: 1
CONSTIPATION: 1
MUSCLE WEAKNESS: 1
CONTUSION: 1
FATIGUE: 1
PERSON REPORTING PAIN: PATIENT
LOWER EXTREMITY EDEMA: 1
PAIN LOCATION - PAIN SEVERITY: 3/10
LAST BOWEL MOVEMENT: 66966

## 2024-05-06 ASSESSMENT — ACTIVITIES OF DAILY LIVING (ADL)
AMBULATION ASSISTANCE: NON-AMBULATORY
CURRENT_FUNCTION: STAND BY ASSIST

## 2024-05-06 ASSESSMENT — SOCIAL DETERMINANTS OF HEALTH (SDOH): ACTIVE STRESSOR - NO STRESS FACTORS: 1

## 2024-05-07 ENCOUNTER — PHARMACY VISIT (OUTPATIENT)
Dept: PHARMACY | Facility: MEDICAL CENTER | Age: 69
End: 2024-05-07
Payer: COMMERCIAL

## 2024-05-07 PROCEDURE — S9126 HOSPICE CARE, IN THE HOME, P: HCPCS

## 2024-05-08 ENCOUNTER — HOME CARE VISIT (OUTPATIENT)
Dept: HOSPICE | Facility: HOSPICE | Age: 69
End: 2024-05-08
Payer: MEDICARE

## 2024-05-08 PROCEDURE — S9126 HOSPICE CARE, IN THE HOME, P: HCPCS

## 2024-05-08 ASSESSMENT — ENCOUNTER SYMPTOMS
LOWER EXTREMITY EDEMA: 1
DENIES PAIN: 1
FATIGUE: 1
PERSON REPORTING PAIN: PATIENT
LAST BOWEL MOVEMENT: 66968
CONSTIPATION: 1
SLEEP QUALITY: ADEQUATE
FATIGUES EASILY: 1

## 2024-05-08 ASSESSMENT — ACTIVITIES OF DAILY LIVING (ADL)
GROOMING_REQUIRES_ASSISTANCE: 1
LAUNDRY_REQUIRES_ASSISTANCE: 1
PHYSICAL_TRANSFER_REQUIRES_ASSISTANCE: 1
SHOPPING_REQUIRES_ASSISTANCE: 1
BATHING_REQUIRES_ASSISTANCE: 1

## 2024-05-08 ASSESSMENT — SOCIAL DETERMINANTS OF HEALTH (SDOH)
ACTIVE STRESSOR - LOSS OF CONTROL: 1
ACTIVE STRESSOR - NO STRESS FACTORS: 1
ACTIVE STRESSOR - EXPRESSED EMOTIONAL NEED: 1

## 2024-05-08 ASSESSMENT — PATIENT HEALTH QUESTIONNAIRE - PHQ9: CLINICAL INTERPRETATION OF PHQ2 SCORE: 0

## 2024-05-09 ENCOUNTER — HOME CARE VISIT (OUTPATIENT)
Dept: HOSPICE | Facility: HOSPICE | Age: 69
End: 2024-05-09
Payer: MEDICARE

## 2024-05-09 PROCEDURE — S9126 HOSPICE CARE, IN THE HOME, P: HCPCS

## 2024-05-10 PROCEDURE — S9126 HOSPICE CARE, IN THE HOME, P: HCPCS

## 2024-05-11 PROCEDURE — S9126 HOSPICE CARE, IN THE HOME, P: HCPCS

## 2024-05-12 PROCEDURE — S9126 HOSPICE CARE, IN THE HOME, P: HCPCS

## 2024-05-13 PROCEDURE — S9126 HOSPICE CARE, IN THE HOME, P: HCPCS

## 2024-05-14 DIAGNOSIS — Z79.01 CHRONIC ANTICOAGULATION: ICD-10-CM

## 2024-05-14 PROCEDURE — S9126 HOSPICE CARE, IN THE HOME, P: HCPCS

## 2024-05-15 ENCOUNTER — HOME CARE VISIT (OUTPATIENT)
Dept: HOSPICE | Facility: HOSPICE | Age: 69
End: 2024-05-15
Payer: MEDICARE

## 2024-05-15 VITALS — HEART RATE: 86 BPM | RESPIRATION RATE: 16 BRPM

## 2024-05-15 PROCEDURE — S9126 HOSPICE CARE, IN THE HOME, P: HCPCS

## 2024-05-15 PROCEDURE — RXMED WILLOW AMBULATORY MEDICATION CHARGE: Performed by: REHABILITATION PRACTITIONER

## 2024-05-15 SDOH — ECONOMIC STABILITY: HOUSING INSECURITY: EVIDENCE OF SMOKING MATERIAL: 0

## 2024-05-15 ASSESSMENT — ACTIVITIES OF DAILY LIVING (ADL): CURRENT_FUNCTION: STAND BY ASSIST

## 2024-05-15 ASSESSMENT — ENCOUNTER SYMPTOMS
LOWER EXTREMITY EDEMA: 1
SLEEP QUALITY: ADEQUATE
MUSCLE WEAKNESS: 1
CONTUSION: 1
DRY SKIN: 1
DENIES PAIN: 1
FATIGUE: 1
DESCRIPTION OF MEMORY LOSS: SHORT TERM
LIMITED RANGE OF MOTION: 1
PERSON REPORTING PAIN: PATIENT
LOWEST PAIN SEVERITY IN PAST 24 HOURS: 0/10
FATIGUES EASILY: 1
LAST BOWEL MOVEMENT: 66975
STOOL FREQUENCY: DAILY
FORGETFULNESS: 1
HIGHEST PAIN SEVERITY IN PAST 24 HOURS: 5/10
PAIN SEVERITY GOAL: 0/10

## 2024-05-15 ASSESSMENT — SOCIAL DETERMINANTS OF HEALTH (SDOH): ACTIVE STRESSOR - NO STRESS FACTORS: 1

## 2024-05-16 ENCOUNTER — PHARMACY VISIT (OUTPATIENT)
Dept: PHARMACY | Facility: MEDICAL CENTER | Age: 69
End: 2024-05-16
Payer: COMMERCIAL

## 2024-05-16 PROCEDURE — S9126 HOSPICE CARE, IN THE HOME, P: HCPCS

## 2024-05-17 ENCOUNTER — HOME CARE VISIT (OUTPATIENT)
Dept: HOSPICE | Facility: HOSPICE | Age: 69
End: 2024-05-17
Payer: MEDICARE

## 2024-05-17 PROCEDURE — S9126 HOSPICE CARE, IN THE HOME, P: HCPCS

## 2024-05-18 PROCEDURE — S9126 HOSPICE CARE, IN THE HOME, P: HCPCS

## 2024-05-19 PROCEDURE — S9126 HOSPICE CARE, IN THE HOME, P: HCPCS

## 2024-05-20 ENCOUNTER — HOME CARE VISIT (OUTPATIENT)
Dept: HOSPICE | Facility: HOSPICE | Age: 69
End: 2024-05-20
Payer: MEDICARE

## 2024-05-20 PROCEDURE — S9126 HOSPICE CARE, IN THE HOME, P: HCPCS

## 2024-05-21 PROCEDURE — S9126 HOSPICE CARE, IN THE HOME, P: HCPCS

## 2024-05-22 ENCOUNTER — HOME CARE VISIT (OUTPATIENT)
Dept: HOSPICE | Facility: HOSPICE | Age: 69
End: 2024-05-22
Payer: MEDICARE

## 2024-05-22 ENCOUNTER — PHARMACY VISIT (OUTPATIENT)
Dept: PHARMACY | Facility: MEDICAL CENTER | Age: 69
End: 2024-05-22
Payer: COMMERCIAL

## 2024-05-22 PROCEDURE — RXMED WILLOW AMBULATORY MEDICATION CHARGE: Performed by: REHABILITATION PRACTITIONER

## 2024-05-22 PROCEDURE — S9126 HOSPICE CARE, IN THE HOME, P: HCPCS

## 2024-05-22 RX ADMIN — MORPHINE SULFATE 10 MG: 100 SOLUTION ORAL at 10:35

## 2024-05-23 VITALS — DIASTOLIC BLOOD PRESSURE: 80 MMHG | SYSTOLIC BLOOD PRESSURE: 140 MMHG

## 2024-05-23 PROCEDURE — S9126 HOSPICE CARE, IN THE HOME, P: HCPCS

## 2024-05-23 SDOH — ECONOMIC STABILITY: HOUSING INSECURITY: EVIDENCE OF SMOKING MATERIAL: 0

## 2024-05-23 SDOH — ECONOMIC STABILITY: HOUSING INSECURITY
HOME SAFETY: INSTRUCTED PATIENT & GRANT ON NO PETROLEUM, NO OILS TO FACE OR UPPER CHEST WHILE USING OXYGEN DUE TO FIRE HAZARD.

## 2024-05-23 ASSESSMENT — ACTIVITIES OF DAILY LIVING (ADL): AMBULATION ASSISTANCE: NON-AMBULATORY

## 2024-05-23 ASSESSMENT — ENCOUNTER SYMPTOMS
DESCRIPTION OF MEMORY LOSS: IMMEDIATE
LAST BOWEL MOVEMENT: 66982
PERSON REPORTING PAIN: PATIENT
DESCRIPTION OF MEMORY LOSS: SHORT TERM
LIMITED RANGE OF MOTION: 1
SHORTNESS OF BREATH: 1
PAIN LOCATION - EXACERBATING FACTORS: BOWEL MOVEMENT
FATIGUES EASILY: 1
PAIN LOCATION - PAIN QUALITY: SORE
STOOL FREQUENCY: DAILY
COUGH: 1
BOWEL INCONTINENCE: 1
SLEEP QUALITY: ADEQUATE
COUGH CHARACTERISTICS: MOIST
DESCRIPTION OF MEMORY LOSS: LONG TERM
FORGETFULNESS: 1
LOWER EXTREMITY EDEMA: 1
PAIN LOCATION - PAIN SEVERITY: 2/10
MUSCLE WEAKNESS: 1
PAIN: 1

## 2024-05-24 ENCOUNTER — HOME CARE VISIT (OUTPATIENT)
Dept: HOSPICE | Facility: HOSPICE | Age: 69
End: 2024-05-24
Payer: MEDICARE

## 2024-05-24 ENCOUNTER — PHARMACY VISIT (OUTPATIENT)
Dept: PHARMACY | Facility: MEDICAL CENTER | Age: 69
End: 2024-05-24
Payer: COMMERCIAL

## 2024-05-24 VITALS — HEART RATE: 80 BPM | RESPIRATION RATE: 18 BRPM

## 2024-05-24 DIAGNOSIS — Z51.5 HOSPICE CARE: ICD-10-CM

## 2024-05-24 DIAGNOSIS — L02.31 CELLULITIS AND ABSCESS OF BUTTOCK: Primary | ICD-10-CM

## 2024-05-24 DIAGNOSIS — L03.317 CELLULITIS AND ABSCESS OF BUTTOCK: Primary | ICD-10-CM

## 2024-05-24 DIAGNOSIS — C21.0 ANAL CANCER (HCC): ICD-10-CM

## 2024-05-24 PROCEDURE — RXMED WILLOW AMBULATORY MEDICATION CHARGE: Performed by: STUDENT IN AN ORGANIZED HEALTH CARE EDUCATION/TRAINING PROGRAM

## 2024-05-24 PROCEDURE — S9126 HOSPICE CARE, IN THE HOME, P: HCPCS

## 2024-05-24 RX ORDER — DOXYCYCLINE 100 MG/1
100 CAPSULE ORAL 2 TIMES DAILY
Qty: 20 CAPSULE | Refills: 0 | Status: SHIPPED | OUTPATIENT
Start: 2024-05-24 | End: 2024-06-03

## 2024-05-24 RX ADMIN — HALOPERIDOL 2 MG: 2 SOLUTION ORAL at 11:26

## 2024-05-24 RX ADMIN — MORPHINE SULFATE 10 MG: 100 SOLUTION ORAL at 11:25

## 2024-05-24 RX ADMIN — HALOPERIDOL 2 MG: 2 SOLUTION ORAL at 11:25

## 2024-05-24 RX ADMIN — HALOPERIDOL 2 MG: 2 SOLUTION ORAL at 11:27

## 2024-05-24 RX ADMIN — MORPHINE SULFATE 10 MG: 100 SOLUTION ORAL at 11:27

## 2024-05-24 RX ADMIN — MORPHINE SULFATE 10 MG: 100 SOLUTION ORAL at 11:26

## 2024-05-24 SDOH — ECONOMIC STABILITY: HOUSING INSECURITY: EVIDENCE OF SMOKING MATERIAL: 0

## 2024-05-24 ASSESSMENT — ACTIVITIES OF DAILY LIVING (ADL)
AMBULATION ASSISTANCE: NON-AMBULATORY
CURRENT_FUNCTION: TWO PERSON

## 2024-05-24 ASSESSMENT — ENCOUNTER SYMPTOMS
SHORTNESS OF BREATH: 1
PAIN: 1
MUSCLE WEAKNESS: 1
COUGH CHARACTERISTICS: NON-PRODUCTIVE
LIMITED RANGE OF MOTION: 1
COUGH CHARACTERISTICS: MOIST
PERSON REPORTING PAIN: PATIENT
FATIGUES EASILY: 1
COUGH: 1

## 2024-05-24 ASSESSMENT — PAIN SCALES - PAIN ASSESSMENT IN ADVANCED DEMENTIA (PAINAD)
FACIALEXPRESSION: 2 - FACIAL GRIMACING.
CONSOLABILITY: 2 - UNABLE TO CONSOLE, DISTRACT OR REASSURE.
BODYLANGUAGE: 1 - TENSE. DISTRESSED PACING. FIDGETING.
NEGVOCALIZATION: 2 - REPEATED TROUBLE CALLING OUT. LOUD MOANING OR GROANING. CRYING.
TOTALSCORE: 8

## 2024-05-25 ENCOUNTER — HOME CARE VISIT (OUTPATIENT)
Dept: HOSPICE | Facility: HOSPICE | Age: 69
End: 2024-05-25
Payer: MEDICARE

## 2024-05-25 VITALS — RESPIRATION RATE: 16 BRPM

## 2024-05-25 PROCEDURE — S9126 HOSPICE CARE, IN THE HOME, P: HCPCS

## 2024-05-25 ASSESSMENT — ACTIVITIES OF DAILY LIVING (ADL)
CURRENT_FUNCTION: TWO PERSON
AMBULATION ASSISTANCE: NON-AMBULATORY

## 2024-05-25 ASSESSMENT — ENCOUNTER SYMPTOMS
MUSCLE WEAKNESS: 1
DENIES PAIN: 1
PERSON REPORTING PAIN: PATIENT
LIMITED RANGE OF MOTION: 1

## 2024-05-26 ENCOUNTER — HOME CARE VISIT (OUTPATIENT)
Dept: HOSPICE | Facility: HOSPICE | Age: 69
End: 2024-05-26
Payer: MEDICARE

## 2024-05-26 PROCEDURE — S9126 HOSPICE CARE, IN THE HOME, P: HCPCS

## 2024-05-27 ENCOUNTER — HOME CARE VISIT (OUTPATIENT)
Dept: HOSPICE | Facility: HOSPICE | Age: 69
End: 2024-05-27
Payer: MEDICARE

## 2024-05-27 PROCEDURE — S9126 HOSPICE CARE, IN THE HOME, P: HCPCS

## 2024-05-28 PROCEDURE — S9126 HOSPICE CARE, IN THE HOME, P: HCPCS

## 2024-05-28 PROCEDURE — RXMED WILLOW AMBULATORY MEDICATION CHARGE: Performed by: REHABILITATION PRACTITIONER

## 2024-05-29 ENCOUNTER — PHARMACY VISIT (OUTPATIENT)
Dept: PHARMACY | Facility: MEDICAL CENTER | Age: 69
End: 2024-05-29
Payer: COMMERCIAL

## 2024-05-29 ENCOUNTER — HOME CARE VISIT (OUTPATIENT)
Dept: HOSPICE | Facility: HOSPICE | Age: 69
End: 2024-05-29
Payer: MEDICARE

## 2024-05-29 PROCEDURE — S9126 HOSPICE CARE, IN THE HOME, P: HCPCS

## 2024-05-30 PROCEDURE — S9126 HOSPICE CARE, IN THE HOME, P: HCPCS

## 2024-05-30 PROCEDURE — RXMED WILLOW AMBULATORY MEDICATION CHARGE: Performed by: REHABILITATION PRACTITIONER

## 2024-05-30 SDOH — ECONOMIC STABILITY: HOUSING INSECURITY: EVIDENCE OF SMOKING MATERIAL: 0

## 2024-05-30 ASSESSMENT — ENCOUNTER SYMPTOMS
PAIN: 1
SLEEP QUALITY: ADEQUATE
LAST BOWEL MOVEMENT: 66989
BOWEL INCONTINENCE: 1
PAIN LOCATION: RECTUM
ORTHOPNEA: 1
PAIN LOCATION - PAIN QUALITY: SORE
LIMITED RANGE OF MOTION: 1
PAIN LOCATION - PAIN SEVERITY: 2/10
SHORTNESS OF BREATH: 1
PERSON REPORTING PAIN: PATIENT
FATIGUES EASILY: 1
MUSCLE WEAKNESS: 1

## 2024-05-30 ASSESSMENT — ACTIVITIES OF DAILY LIVING (ADL): AMBULATION ASSISTANCE: NON-AMBULATORY

## 2024-05-31 ENCOUNTER — HOME CARE VISIT (OUTPATIENT)
Dept: HOSPICE | Facility: HOSPICE | Age: 69
End: 2024-05-31
Payer: MEDICARE

## 2024-05-31 ENCOUNTER — PHARMACY VISIT (OUTPATIENT)
Dept: PHARMACY | Facility: MEDICAL CENTER | Age: 69
End: 2024-05-31
Payer: COMMERCIAL

## 2024-05-31 PROCEDURE — S9126 HOSPICE CARE, IN THE HOME, P: HCPCS

## 2024-06-01 PROCEDURE — S9126 HOSPICE CARE, IN THE HOME, P: HCPCS

## 2024-06-02 PROCEDURE — S9126 HOSPICE CARE, IN THE HOME, P: HCPCS

## 2024-06-03 ENCOUNTER — HOME CARE VISIT (OUTPATIENT)
Dept: HOSPICE | Facility: HOSPICE | Age: 69
End: 2024-06-03
Payer: MEDICARE

## 2024-06-03 PROCEDURE — S9126 HOSPICE CARE, IN THE HOME, P: HCPCS

## 2024-06-03 PROCEDURE — G0156 HHCP-SVS OF AIDE,EA 15 MIN: HCPCS

## 2024-06-04 DIAGNOSIS — Z79.01 CHRONIC ANTICOAGULATION: ICD-10-CM

## 2024-06-04 PROCEDURE — S9126 HOSPICE CARE, IN THE HOME, P: HCPCS

## 2024-06-05 ENCOUNTER — HOME CARE VISIT (OUTPATIENT)
Dept: HOSPICE | Facility: HOSPICE | Age: 69
End: 2024-06-05
Payer: MEDICARE

## 2024-06-05 PROCEDURE — S9126 HOSPICE CARE, IN THE HOME, P: HCPCS

## 2024-06-05 PROCEDURE — G0156 HHCP-SVS OF AIDE,EA 15 MIN: HCPCS

## 2024-06-05 PROCEDURE — G0299 HHS/HOSPICE OF RN EA 15 MIN: HCPCS

## 2024-06-05 RX ADMIN — MORPHINE SULFATE 10 MG: 100 SOLUTION ORAL at 13:52

## 2024-06-06 ENCOUNTER — HOME CARE VISIT (OUTPATIENT)
Dept: HOSPICE | Facility: HOSPICE | Age: 69
End: 2024-06-06
Payer: MEDICARE

## 2024-06-06 PROCEDURE — S9126 HOSPICE CARE, IN THE HOME, P: HCPCS

## 2024-06-06 SDOH — ECONOMIC STABILITY: HOUSING INSECURITY: HOME SAFETY: 1 E TANK, M 6 TANKS IN RACK

## 2024-06-06 SDOH — ECONOMIC STABILITY: HOUSING INSECURITY: EVIDENCE OF SMOKING MATERIAL: 0

## 2024-06-06 ASSESSMENT — ENCOUNTER SYMPTOMS
BOWEL INCONTINENCE: 1
ORTHOPNEA: 1
PERSON REPORTING PAIN: DIRECT OBSERVATION
LAST BOWEL MOVEMENT: 66995
SHORTNESS OF BREATH: 1
STOOL FREQUENCY: TWICE DAILY
FORGETFULNESS: 1
LIMITED RANGE OF MOTION: 1
FATIGUES EASILY: 1
MUSCLE WEAKNESS: 1

## 2024-06-06 ASSESSMENT — PAIN SCALES - PAIN ASSESSMENT IN ADVANCED DEMENTIA (PAINAD)
FACIALEXPRESSION: 2 - FACIAL GRIMACING.
NEGVOCALIZATION: 1 - OCCASIONAL MOAN OR GROAN. LOW-LEVEL SPEECH WITH A NEGATIVE OR DISAPPROVING QUALITY.
CONSOLABILITY: 1 - DISTRACTED OR REASSURED BY VOICE OR TOUCH.
BODYLANGUAGE: 1 - TENSE. DISTRESSED PACING. FIDGETING.
TOTALSCORE: 5

## 2024-06-06 ASSESSMENT — ACTIVITIES OF DAILY LIVING (ADL)
MONEY MANAGEMENT (EXPENSES/BILLS): TOTALLY DEPENDENT
AMBULATION ASSISTANCE: NON-AMBULATORY

## 2024-06-07 PROCEDURE — S9126 HOSPICE CARE, IN THE HOME, P: HCPCS

## 2024-06-08 ENCOUNTER — HOME CARE VISIT (OUTPATIENT)
Dept: HOSPICE | Facility: HOSPICE | Age: 69
End: 2024-06-08
Payer: MEDICARE

## 2024-06-08 PROCEDURE — S9126 HOSPICE CARE, IN THE HOME, P: HCPCS

## 2024-06-09 ENCOUNTER — HOME CARE VISIT (OUTPATIENT)
Dept: HOSPICE | Facility: HOSPICE | Age: 69
End: 2024-06-09
Payer: MEDICARE

## 2024-06-09 PROCEDURE — G0299 HHS/HOSPICE OF RN EA 15 MIN: HCPCS

## 2024-06-09 PROCEDURE — RXMED WILLOW AMBULATORY MEDICATION CHARGE: Performed by: REHABILITATION PRACTITIONER

## 2024-06-09 PROCEDURE — S9126 HOSPICE CARE, IN THE HOME, P: HCPCS

## 2024-06-09 ASSESSMENT — ENCOUNTER SYMPTOMS
DENIES PAIN: 1
PERSON REPORTING PAIN: PATIENT

## 2024-06-10 ENCOUNTER — HOME CARE VISIT (OUTPATIENT)
Dept: HOSPICE | Facility: HOSPICE | Age: 69
End: 2024-06-10
Payer: MEDICARE

## 2024-06-10 ENCOUNTER — PHARMACY VISIT (OUTPATIENT)
Dept: PHARMACY | Facility: MEDICAL CENTER | Age: 69
End: 2024-06-10
Payer: COMMERCIAL

## 2024-06-10 PROCEDURE — S9126 HOSPICE CARE, IN THE HOME, P: HCPCS

## 2024-06-11 ENCOUNTER — HOME CARE VISIT (OUTPATIENT)
Dept: HOSPICE | Facility: HOSPICE | Age: 69
End: 2024-06-11
Payer: MEDICARE

## 2024-06-11 VITALS — HEART RATE: 80 BPM | TEMPERATURE: 208.4 F

## 2024-06-11 PROCEDURE — G0151 HHCP-SERV OF PT,EA 15 MIN: HCPCS

## 2024-06-11 PROCEDURE — S9126 HOSPICE CARE, IN THE HOME, P: HCPCS

## 2024-06-11 ASSESSMENT — ENCOUNTER SYMPTOMS: PAIN LOCATION: R BUTTOCKS

## 2024-06-12 ENCOUNTER — PHARMACY VISIT (OUTPATIENT)
Dept: PHARMACY | Facility: MEDICAL CENTER | Age: 69
End: 2024-06-12
Payer: COMMERCIAL

## 2024-06-12 ENCOUNTER — HOME CARE VISIT (OUTPATIENT)
Dept: HOSPICE | Facility: HOSPICE | Age: 69
End: 2024-06-12
Payer: MEDICARE

## 2024-06-12 VITALS — HEART RATE: 96 BPM | RESPIRATION RATE: 16 BRPM

## 2024-06-12 PROCEDURE — G0299 HHS/HOSPICE OF RN EA 15 MIN: HCPCS

## 2024-06-12 PROCEDURE — G0156 HHCP-SVS OF AIDE,EA 15 MIN: HCPCS

## 2024-06-12 PROCEDURE — S9126 HOSPICE CARE, IN THE HOME, P: HCPCS

## 2024-06-12 PROCEDURE — RXMED WILLOW AMBULATORY MEDICATION CHARGE: Performed by: REHABILITATION PRACTITIONER

## 2024-06-12 SDOH — HEALTH STABILITY: PHYSICAL HEALTH: EXERCISE COMMENTS: XIA OR RECEPTIVE APHASIA.

## 2024-06-12 SDOH — HEALTH STABILITY: PHYSICAL HEALTH

## 2024-06-12 SDOH — ECONOMIC STABILITY: HOUSING INSECURITY: EVIDENCE OF SMOKING MATERIAL: 0

## 2024-06-12 ASSESSMENT — ACTIVITIES OF DAILY LIVING (ADL)
FEEDING_COMMENTS: NT
CURRENT_FUNCTION: MODERATE ASSIST
AMBULATION ASSISTANCE: NON-AMBULATORY
PHYSICAL TRANSFERS ASSESSED: 1
GROOMING_COMMENTS: NT

## 2024-06-12 ASSESSMENT — ENCOUNTER SYMPTOMS
FATIGUES EASILY: 1
DENIES PAIN: 1
BOWEL INCONTINENCE: 1
LIMITED RANGE OF MOTION: 1
STOOL FREQUENCY: DAILY
LOWEST PAIN SEVERITY IN PAST 24 HOURS: 0/10
DYSPNEA ACTIVITY LEVEL: AT REST
SLEEP QUALITY: ADEQUATE
PERSON REPORTING PAIN: PATIENT
SHORTNESS OF BREATH: 1
LAST BOWEL MOVEMENT: 67003
HIGHEST PAIN SEVERITY IN PAST 24 HOURS: 4/10
MUSCLE WEAKNESS: 1
PAIN SEVERITY GOAL: 0/10

## 2024-06-12 ASSESSMENT — SOCIAL DETERMINANTS OF HEALTH (SDOH): ACTIVE STRESSOR - NO STRESS FACTORS: 1

## 2024-06-13 ENCOUNTER — HOME CARE VISIT (OUTPATIENT)
Dept: HOSPICE | Facility: HOSPICE | Age: 69
End: 2024-06-13
Payer: MEDICARE

## 2024-06-13 PROCEDURE — S9126 HOSPICE CARE, IN THE HOME, P: HCPCS

## 2024-06-14 PROCEDURE — S9126 HOSPICE CARE, IN THE HOME, P: HCPCS

## 2024-06-15 PROCEDURE — S9126 HOSPICE CARE, IN THE HOME, P: HCPCS

## 2024-06-16 PROCEDURE — S9126 HOSPICE CARE, IN THE HOME, P: HCPCS

## 2024-06-17 ENCOUNTER — HOME CARE VISIT (OUTPATIENT)
Dept: HOSPICE | Facility: HOSPICE | Age: 69
End: 2024-06-17
Payer: MEDICARE

## 2024-06-17 PROCEDURE — G0156 HHCP-SVS OF AIDE,EA 15 MIN: HCPCS

## 2024-06-17 PROCEDURE — S9126 HOSPICE CARE, IN THE HOME, P: HCPCS

## 2024-06-17 PROCEDURE — G0299 HHS/HOSPICE OF RN EA 15 MIN: HCPCS

## 2024-06-17 SDOH — ECONOMIC STABILITY: HOUSING INSECURITY: EVIDENCE OF SMOKING MATERIAL: 0

## 2024-06-17 ASSESSMENT — ENCOUNTER SYMPTOMS
PAIN LOCATION - EXACERBATING FACTORS: BLADDER SPASM
PAIN LOCATION - PAIN QUALITY: SHARP
PAIN LOCATION - PAIN FREQUENCY: CONSTANT
PAIN LOCATION - PAIN DURATION: 3
PAIN LOCATION - PAIN SEVERITY: 8/10
PAIN LOCATION: PENIS
PAIN: 1
PERSON REPORTING PAIN: PATIENT

## 2024-06-18 ENCOUNTER — HOME CARE VISIT (OUTPATIENT)
Dept: HOSPICE | Facility: HOSPICE | Age: 69
End: 2024-06-18
Payer: MEDICARE

## 2024-06-18 PROCEDURE — G0299 HHS/HOSPICE OF RN EA 15 MIN: HCPCS

## 2024-06-18 PROCEDURE — S9126 HOSPICE CARE, IN THE HOME, P: HCPCS

## 2024-06-19 ENCOUNTER — HOME CARE VISIT (OUTPATIENT)
Dept: HOSPICE | Facility: HOSPICE | Age: 69
End: 2024-06-19
Payer: MEDICARE

## 2024-06-19 PROCEDURE — G0156 HHCP-SVS OF AIDE,EA 15 MIN: HCPCS

## 2024-06-19 PROCEDURE — S9126 HOSPICE CARE, IN THE HOME, P: HCPCS

## 2024-06-19 SDOH — ECONOMIC STABILITY: HOUSING INSECURITY: EVIDENCE OF SMOKING MATERIAL: 0

## 2024-06-19 ASSESSMENT — ENCOUNTER SYMPTOMS
FATIGUES EASILY: 1
LIMITED RANGE OF MOTION: 1
DENIES PAIN: 1
SHORTNESS OF BREATH: 1
LAST BOWEL MOVEMENT: 67009
MUSCLE WEAKNESS: 1
FORGETFULNESS: 1
PERSON REPORTING PAIN: PATIENT
BOWEL INCONTINENCE: 1
DYSPNEA ACTIVITY LEVEL: AT REST
ORTHOPNEA: 1

## 2024-06-20 ENCOUNTER — PHARMACY VISIT (OUTPATIENT)
Dept: PHARMACY | Facility: MEDICAL CENTER | Age: 69
End: 2024-06-20
Payer: COMMERCIAL

## 2024-06-20 ENCOUNTER — HOME CARE VISIT (OUTPATIENT)
Dept: HOSPICE | Facility: HOSPICE | Age: 69
End: 2024-06-20
Payer: MEDICARE

## 2024-06-20 PROCEDURE — S9126 HOSPICE CARE, IN THE HOME, P: HCPCS

## 2024-06-20 PROCEDURE — RXMED WILLOW AMBULATORY MEDICATION CHARGE: Performed by: REHABILITATION PRACTITIONER

## 2024-06-21 ENCOUNTER — HOME CARE VISIT (OUTPATIENT)
Dept: HOSPICE | Facility: HOSPICE | Age: 69
End: 2024-06-21
Payer: MEDICARE

## 2024-06-21 PROCEDURE — S9126 HOSPICE CARE, IN THE HOME, P: HCPCS

## 2024-06-22 PROCEDURE — S9126 HOSPICE CARE, IN THE HOME, P: HCPCS

## 2024-06-23 ENCOUNTER — PHARMACY VISIT (OUTPATIENT)
Dept: PHARMACY | Facility: MEDICAL CENTER | Age: 69
End: 2024-06-23
Payer: COMMERCIAL

## 2024-06-23 ENCOUNTER — HOME CARE VISIT (OUTPATIENT)
Dept: HOSPICE | Facility: HOSPICE | Age: 69
End: 2024-06-23
Payer: MEDICARE

## 2024-06-23 VITALS — RESPIRATION RATE: 16 BRPM

## 2024-06-23 PROCEDURE — RXMED WILLOW AMBULATORY MEDICATION CHARGE: Performed by: NURSE PRACTITIONER

## 2024-06-23 PROCEDURE — G0299 HHS/HOSPICE OF RN EA 15 MIN: HCPCS

## 2024-06-23 PROCEDURE — S9126 HOSPICE CARE, IN THE HOME, P: HCPCS

## 2024-06-23 RX ORDER — LACTULOSE 10 G/15ML
SOLUTION ORAL
Qty: 473 ML | Refills: 6 | OUTPATIENT
Start: 2024-06-23

## 2024-06-23 ASSESSMENT — PAIN SCALES - PAIN ASSESSMENT IN ADVANCED DEMENTIA (PAINAD)
CONSOLABILITY: 1 - DISTRACTED OR REASSURED BY VOICE OR TOUCH.
TOTALSCORE: 4
BODYLANGUAGE: 1 - TENSE. DISTRESSED PACING. FIDGETING.
FACIALEXPRESSION: 1 - SAD. FRIGHTENED. FROWN.
NEGVOCALIZATION: 1 - OCCASIONAL MOAN OR GROAN. LOW-LEVEL SPEECH WITH A NEGATIVE OR DISAPPROVING QUALITY.

## 2024-06-23 ASSESSMENT — ENCOUNTER SYMPTOMS
PAIN: 1
LAST BOWEL MOVEMENT: 67011
PERSON REPORTING PAIN: PATIENT
STOOL FREQUENCY: DAILY
PAIN LOCATION: RECTUM

## 2024-06-24 ENCOUNTER — HOME CARE VISIT (OUTPATIENT)
Dept: HOSPICE | Facility: HOSPICE | Age: 69
End: 2024-06-24
Payer: MEDICARE

## 2024-06-24 VITALS — DIASTOLIC BLOOD PRESSURE: 88 MMHG | SYSTOLIC BLOOD PRESSURE: 120 MMHG

## 2024-06-24 PROCEDURE — S9126 HOSPICE CARE, IN THE HOME, P: HCPCS

## 2024-06-24 PROCEDURE — G0151 HHCP-SERV OF PT,EA 15 MIN: HCPCS

## 2024-06-24 PROCEDURE — G0156 HHCP-SVS OF AIDE,EA 15 MIN: HCPCS

## 2024-06-25 ENCOUNTER — HOME CARE VISIT (OUTPATIENT)
Dept: HOSPICE | Facility: HOSPICE | Age: 69
End: 2024-06-25
Payer: MEDICARE

## 2024-06-25 PROCEDURE — G0151 HHCP-SERV OF PT,EA 15 MIN: HCPCS

## 2024-06-25 PROCEDURE — S9126 HOSPICE CARE, IN THE HOME, P: HCPCS

## 2024-06-26 ENCOUNTER — PHARMACY VISIT (OUTPATIENT)
Dept: PHARMACY | Facility: MEDICAL CENTER | Age: 69
End: 2024-06-26
Payer: COMMERCIAL

## 2024-06-26 ENCOUNTER — HOME CARE VISIT (OUTPATIENT)
Dept: HOSPICE | Facility: HOSPICE | Age: 69
End: 2024-06-26
Payer: MEDICARE

## 2024-06-26 VITALS — DIASTOLIC BLOOD PRESSURE: 60 MMHG | SYSTOLIC BLOOD PRESSURE: 90 MMHG

## 2024-06-26 PROCEDURE — G0299 HHS/HOSPICE OF RN EA 15 MIN: HCPCS

## 2024-06-26 PROCEDURE — S9126 HOSPICE CARE, IN THE HOME, P: HCPCS

## 2024-06-26 PROCEDURE — RXMED WILLOW AMBULATORY MEDICATION CHARGE: Performed by: REHABILITATION PRACTITIONER

## 2024-06-26 PROCEDURE — G0151 HHCP-SERV OF PT,EA 15 MIN: HCPCS

## 2024-06-26 PROCEDURE — G0156 HHCP-SVS OF AIDE,EA 15 MIN: HCPCS

## 2024-06-26 ASSESSMENT — ACTIVITIES OF DAILY LIVING (ADL)
PHYSICAL TRANSFERS ASSESSED: 1
CURRENT_FUNCTION: MODERATE ASSIST

## 2024-06-27 ENCOUNTER — HOME CARE VISIT (OUTPATIENT)
Dept: HOSPICE | Facility: HOSPICE | Age: 69
End: 2024-06-27
Payer: MEDICARE

## 2024-06-27 PROCEDURE — S9126 HOSPICE CARE, IN THE HOME, P: HCPCS

## 2024-06-27 PROCEDURE — RXMED WILLOW AMBULATORY MEDICATION CHARGE: Performed by: REHABILITATION PRACTITIONER

## 2024-06-27 PROCEDURE — G0299 HHS/HOSPICE OF RN EA 15 MIN: HCPCS

## 2024-06-27 ASSESSMENT — ENCOUNTER SYMPTOMS
PAIN LOCATION - PAIN SEVERITY: 2/10
PERSON REPORTING PAIN: DIRECT OBSERVATION
LAST BOWEL MOVEMENT: 67017
LAST BOWEL MOVEMENT: 67018
PAIN LOCATION: ABDOMEN
PAIN LOCATION - PAIN QUALITY: SORE

## 2024-06-27 ASSESSMENT — PAIN SCALES - PAIN ASSESSMENT IN ADVANCED DEMENTIA (PAINAD)
CONSOLABILITY: 0 - NO NEED TO CONSOLE.
BODYLANGUAGE: 0 - RELAXED.
FACIALEXPRESSION: 1 - SAD. FRIGHTENED. FROWN.
TOTALSCORE: 2
NEGVOCALIZATION: 1 - OCCASIONAL MOAN OR GROAN. LOW-LEVEL SPEECH WITH A NEGATIVE OR DISAPPROVING QUALITY.

## 2024-06-28 ENCOUNTER — PHARMACY VISIT (OUTPATIENT)
Dept: PHARMACY | Facility: MEDICAL CENTER | Age: 69
End: 2024-06-28
Payer: COMMERCIAL

## 2024-06-28 PROCEDURE — S9126 HOSPICE CARE, IN THE HOME, P: HCPCS

## 2024-06-29 PROCEDURE — S9126 HOSPICE CARE, IN THE HOME, P: HCPCS

## 2024-06-30 PROCEDURE — S9126 HOSPICE CARE, IN THE HOME, P: HCPCS

## 2024-09-19 NOTE — OP THERAPY DAILY TREATMENT
"  Outpatient Occupational Therapy  DAILY TREATMENT     [facility] Occup[facility] [address]  [address]  [place] [address]  Phone:  [phone]  Fax:  [phone]    Date: 05/22/2018    Patient: Drew Melton  YOB: 1955  MRN: [id number]     Time Calculation  Start time: 0330  Stop time: 0430 Time Calculation (min): 60 minutes     Chief Complaint: Extremity Weakness (right, spasticity)    Visit #: 2    SUBJECTIVE: \"We are going to start speech therapy\"    OBJECTIVE:  Current objective measures:   RUE strength: 0/5 except scapular elevation 3/5  Digit flexor tone: Grade 3, wrist flexor tone Grade 2 on MAS        Therapeutic Treatments and Modalities:    1. Orthotic Training (CPT 98035)    2. Neuromuscular Re-education (CPT 38150)    Therapeutic Treatments and Modalities Summary: Fabricated right hand roll with velfoam strap over MCP joints to prevent further contractures, improve accessibility for stretching and hygiene.  Pt reported it felt comfortable.  Performed RUE passive sustained stretch shoulder to digits with focus on reducible flexion contractures of wrist and digits 1-4.  Pt's SF contracture is irreducible at PIP joint with DIP joint laxity.  Pt instructed on self-stretching of digits and wrist with friend present.  Attempted NM re-ed of RUE using facilitory techniques of high speed vibration, quick stretch, muscle belly tapping, and WB without palpable success. Discussed pt's challenges with meal prep (opening containers, spreading peanut butter on bread with use of LH only).  Will research AE to improve independence and safety during these activities.        Time-based treatments/modalities:  Neuromusc re-ed minutes (CPT 04357): 45 minutes  Orthotics training initial, minutes (CPT 15651): 15 minutes      ASSESSMENT:   Response to treatment:  Pt responded well to RUE passive sustained stretching with improved soft tissue mobility and decreased subsequent pain, " most notable in wrist and digit extension.  Attempts at RUE NM re-ed were unsuccessful.  Fabricated right hand roll to improve positioning, hygiene, and prevent further contractures.  Will focus on AE/compensatory strategies next visit for successful independent meal prep as that is pt's primary goal.    PLAN/RECOMMENDATIONS:   Plan for treatment: therapy treatment to continue next visit.  Planned interventions for next visit: continue with current treatment, functional training/self care (CPT 08726), neuromuscular re-education (CPT 18239), orthotic training (CPT 31647) and therapeutic exercise (CPT 07038)       PERRL/EOMI/conjunctiva clear/normal

## 2024-12-03 NOTE — ED TRIAGE NOTES
Chief Complaint   Patient presents with    Abdominal Pain     Pt states he is having lower abdominal pain 3/10, pt states pain has been going on for few days, pt states his hemorrhoids have been bleeding when he has a bowel movement.      Leg Pain     Pt states he had a toe nail removed in his left foot, and feel like the nail is growing back causing his leg to be numb, numbness has been going on for a few months.       BP (!) 166/84   Pulse (!) 101   Temp 36.4 °C (97.5 °F) (Temporal)   Resp 17   Ht 1.829 m (6')   Wt 59 kg (130 lb)   SpO2 94%   BMI 17.63 kg/m²      Pt will have Lia help her schedule the lab appt a week prior to prolia injection on 12/30/24.

## (undated) DEVICE — SPONGE GAUZESTER 4 X 4 4PLY - (128PK/CA)

## (undated) DEVICE — FILM CASSETTE ENDO

## (undated) DEVICE — CATHETER IV 20 GA X 1-1/4 ---SURG.& SDS ONLY--- (50EA/BX)

## (undated) DEVICE — SODIUM CHL IRRIGATION 0.9% 1000ML (12EA/CA)

## (undated) DEVICE — GOWN SURGEONS X-LARGE - DISP. (30/CA)

## (undated) DEVICE — PACK MINOR BASIN - (2EA/CA)

## (undated) DEVICE — DRAPE LAPAROTOMY T SHEET - (12EA/CA)

## (undated) DEVICE — SET LEADWIRE 5 LEAD BEDSIDE DISPOSABLE ECG (1SET OF 5/EA)

## (undated) DEVICE — LACTATED RINGERS INJ 1000 ML - (14EA/CA 60CA/PF)

## (undated) DEVICE — SENSOR OXIMETER ADULT SPO2 RD SET (20EA/BX)

## (undated) DEVICE — SUCTION INSTRUMENT YANKAUER BULBOUS TIP W/O VENT (50EA/CA)

## (undated) DEVICE — ELECTRODE DUAL RETURN W/ CORD - (50/PK)

## (undated) DEVICE — SLEEVE VASO CALF MED - (10PR/CA)

## (undated) DEVICE — CANISTER SUCTION RIGID RED 1500CC (40EA/CA)

## (undated) DEVICE — MANIFOLD NEPTUNE 1 PORT (20/PK)

## (undated) DEVICE — BASIN EMESIS DISP. - (250/CA)

## (undated) DEVICE — KIT CUSTOM PROCEDURE SINGLE FOR ENDO  (15/CA)

## (undated) DEVICE — GOWN WARMING STANDARD FLEX - (30/CA)

## (undated) DEVICE — HEADREST ADULT HIGH PROFILE

## (undated) DEVICE — SUTURE 2-0 PROLENE SH (36PK/BX)

## (undated) DEVICE — COVER LIGHT HANDLE ALC PLUS DISP (18EA/BX)

## (undated) DEVICE — GAUZE FLUFF STERILE 2-PLY 36 X 36 (100EA/CA)

## (undated) DEVICE — BRIEF STRETCH MATERNITY M/L - FITS 20-60IN (5EA/BG 20BG/CA)

## (undated) DEVICE — BOVIE NEEDLE TIP 3CM COLORADO

## (undated) DEVICE — NEPTUNE 4 PORT MANIFOLD - (20/PK)

## (undated) DEVICE — TUBE CONNECTING SUCTION - CLEAR PLASTIC STERILE 72 IN (50EA/CA)

## (undated) DEVICE — GLOVE BIOGEL SZ 8 SURGICAL PF LTX - (50PR/BX 4BX/CA)

## (undated) DEVICE — CANISTER SUCTION 3000ML MECHANICAL FILTER AUTO SHUTOFF MEDI-VAC NONSTERILE LF DISP  (40EA/CA)

## (undated) DEVICE — TUBING CLEARLINK DUO-VENT - C-FLO (48EA/CA)

## (undated) DEVICE — SUTURE GENERAL

## (undated) DEVICE — TRAY SRGPRP PVP IOD WT PRP - (20/CA)

## (undated) DEVICE — SPONGE GAUZE NON-STERILE 4X4 - (2000/CA 10PK/CA)

## (undated) DEVICE — TUBE NG SALEM SUMP 14FR (50EA/BX)

## (undated) DEVICE — SET EXTENSION WITH 2 PORTS (48EA/CA) ***PART #2C8610 IS A SUBSTITUTE*****